# Patient Record
Sex: MALE | Employment: UNEMPLOYED | URBAN - METROPOLITAN AREA
[De-identification: names, ages, dates, MRNs, and addresses within clinical notes are randomized per-mention and may not be internally consistent; named-entity substitution may affect disease eponyms.]

---

## 2019-01-01 ENCOUNTER — OFFICE VISIT (OUTPATIENT)
Dept: INTERPRETER SERVICES | Facility: CLINIC | Age: 69
End: 2019-01-01

## 2019-01-01 ENCOUNTER — APPOINTMENT (OUTPATIENT)
Dept: CARDIOLOGY | Facility: CLINIC | Age: 69
DRG: 270 | End: 2019-01-01
Attending: INTERNAL MEDICINE
Payer: MEDICARE

## 2019-01-01 ENCOUNTER — ALLIED HEALTH/NURSE VISIT (OUTPATIENT)
Dept: NEUROLOGY | Facility: CLINIC | Age: 69
DRG: 270 | End: 2019-01-01
Attending: PSYCHIATRY & NEUROLOGY
Payer: MEDICARE

## 2019-01-01 ENCOUNTER — APPOINTMENT (OUTPATIENT)
Dept: CT IMAGING | Facility: CLINIC | Age: 69
DRG: 270 | End: 2019-01-01
Attending: INTERNAL MEDICINE
Payer: MEDICARE

## 2019-01-01 ENCOUNTER — APPOINTMENT (OUTPATIENT)
Dept: GENERAL RADIOLOGY | Facility: CLINIC | Age: 69
DRG: 270 | End: 2019-01-01
Attending: STUDENT IN AN ORGANIZED HEALTH CARE EDUCATION/TRAINING PROGRAM
Payer: MEDICARE

## 2019-01-01 ENCOUNTER — APPOINTMENT (OUTPATIENT)
Dept: ULTRASOUND IMAGING | Facility: CLINIC | Age: 69
DRG: 270 | End: 2019-01-01
Attending: STUDENT IN AN ORGANIZED HEALTH CARE EDUCATION/TRAINING PROGRAM
Payer: MEDICARE

## 2019-01-01 ENCOUNTER — APPOINTMENT (OUTPATIENT)
Dept: GENERAL RADIOLOGY | Facility: CLINIC | Age: 69
DRG: 270 | End: 2019-01-01
Attending: INTERNAL MEDICINE
Payer: MEDICARE

## 2019-01-01 ENCOUNTER — APPOINTMENT (OUTPATIENT)
Dept: CT IMAGING | Facility: CLINIC | Age: 69
DRG: 270 | End: 2019-01-01
Attending: STUDENT IN AN ORGANIZED HEALTH CARE EDUCATION/TRAINING PROGRAM
Payer: MEDICARE

## 2019-01-01 ENCOUNTER — HOSPITAL ENCOUNTER (INPATIENT)
Facility: CLINIC | Age: 69
LOS: 5 days | DRG: 270 | End: 2019-08-06
Admitting: INTERNAL MEDICINE
Payer: MEDICARE

## 2019-01-01 ENCOUNTER — APPOINTMENT (OUTPATIENT)
Dept: NUCLEAR MEDICINE | Facility: CLINIC | Age: 69
DRG: 270 | End: 2019-01-01
Attending: INTERNAL MEDICINE
Payer: MEDICARE

## 2019-01-01 ENCOUNTER — TRANSFERRED RECORDS (OUTPATIENT)
Dept: HEALTH INFORMATION MANAGEMENT | Facility: CLINIC | Age: 69
End: 2019-01-01

## 2019-01-01 ENCOUNTER — APPOINTMENT (OUTPATIENT)
Dept: CARDIOLOGY | Facility: CLINIC | Age: 69
DRG: 270 | End: 2019-01-01
Attending: STUDENT IN AN ORGANIZED HEALTH CARE EDUCATION/TRAINING PROGRAM
Payer: MEDICARE

## 2019-01-01 VITALS
BODY MASS INDEX: 29.69 KG/M2 | WEIGHT: 212.08 LBS | HEIGHT: 71 IN | TEMPERATURE: 97 F | OXYGEN SATURATION: 100 % | RESPIRATION RATE: 12 BRPM

## 2019-01-01 DIAGNOSIS — I46.9 CARDIAC ARREST (H): Primary | ICD-10-CM

## 2019-01-01 LAB
ABO + RH BLD: NORMAL
ALBUMIN SERPL-MCNC: 1.6 G/DL (ref 3.4–5)
ALBUMIN SERPL-MCNC: 1.7 G/DL (ref 3.4–5)
ALBUMIN SERPL-MCNC: 2 G/DL (ref 3.4–5)
ALBUMIN SERPL-MCNC: 2.1 G/DL (ref 3.4–5)
ALBUMIN SERPL-MCNC: 2.2 G/DL (ref 3.4–5)
ALBUMIN SERPL-MCNC: 2.4 G/DL (ref 3.4–5)
ALBUMIN SERPL-MCNC: 2.4 G/DL (ref 3.4–5)
ALBUMIN SERPL-MCNC: 2.5 G/DL (ref 3.4–5)
ALBUMIN SERPL-MCNC: 2.6 G/DL (ref 3.4–5)
ALBUMIN SERPL-MCNC: 2.6 G/DL (ref 3.4–5)
ALBUMIN SERPL-MCNC: 2.7 G/DL (ref 3.4–5)
ALBUMIN SERPL-MCNC: 2.8 G/DL (ref 3.4–5)
ALBUMIN SERPL-MCNC: 2.9 G/DL (ref 3.4–5)
ALBUMIN SERPL-MCNC: 3 G/DL (ref 3.4–5)
ALBUMIN SERPL-MCNC: 3 G/DL (ref 3.4–5)
ALBUMIN SERPL-MCNC: 3.2 G/DL (ref 3.4–5)
ALBUMIN UR-MCNC: 100 MG/DL
ALP SERPL-CCNC: 28 U/L (ref 40–150)
ALP SERPL-CCNC: 31 U/L (ref 40–150)
ALP SERPL-CCNC: 32 U/L (ref 40–150)
ALP SERPL-CCNC: 33 U/L (ref 40–150)
ALP SERPL-CCNC: 34 U/L (ref 40–150)
ALP SERPL-CCNC: 34 U/L (ref 40–150)
ALP SERPL-CCNC: 45 U/L (ref 40–150)
ALP SERPL-CCNC: 48 U/L (ref 40–150)
ALP SERPL-CCNC: 49 U/L (ref 40–150)
ALP SERPL-CCNC: 51 U/L (ref 40–150)
ALP SERPL-CCNC: 53 U/L (ref 40–150)
ALP SERPL-CCNC: 57 U/L (ref 40–150)
ALP SERPL-CCNC: 60 U/L (ref 40–150)
ALP SERPL-CCNC: 63 U/L (ref 40–150)
ALP SERPL-CCNC: 65 U/L (ref 40–150)
ALP SERPL-CCNC: 68 U/L (ref 40–150)
ALP SERPL-CCNC: 71 U/L (ref 40–150)
ALP SERPL-CCNC: 74 U/L (ref 40–150)
ALP SERPL-CCNC: 78 U/L (ref 40–150)
ALP SERPL-CCNC: 79 U/L (ref 40–150)
ALP SERPL-CCNC: 79 U/L (ref 40–150)
ALP SERPL-CCNC: 84 U/L (ref 40–150)
ALT SERPL W P-5'-P-CCNC: 103 U/L (ref 0–70)
ALT SERPL W P-5'-P-CCNC: 105 U/L (ref 0–70)
ALT SERPL W P-5'-P-CCNC: 106 U/L (ref 0–70)
ALT SERPL W P-5'-P-CCNC: 108 U/L (ref 0–70)
ALT SERPL W P-5'-P-CCNC: 113 U/L (ref 0–70)
ALT SERPL W P-5'-P-CCNC: 1139 U/L (ref 0–70)
ALT SERPL W P-5'-P-CCNC: 1170 U/L (ref 0–70)
ALT SERPL W P-5'-P-CCNC: 1324 U/L (ref 0–70)
ALT SERPL W P-5'-P-CCNC: 1402 U/L (ref 0–70)
ALT SERPL W P-5'-P-CCNC: 1410 U/L (ref 0–70)
ALT SERPL W P-5'-P-CCNC: 147 U/L (ref 0–70)
ALT SERPL W P-5'-P-CCNC: 159 U/L (ref 0–70)
ALT SERPL W P-5'-P-CCNC: 1690 U/L (ref 0–70)
ALT SERPL W P-5'-P-CCNC: 1964 U/L (ref 0–70)
ALT SERPL W P-5'-P-CCNC: 2089 U/L (ref 0–70)
ALT SERPL W P-5'-P-CCNC: 2156 U/L (ref 0–70)
ALT SERPL W P-5'-P-CCNC: 2319 U/L (ref 0–70)
ALT SERPL W P-5'-P-CCNC: 2576 U/L (ref 0–70)
ALT SERPL W P-5'-P-CCNC: 2790 U/L (ref 0–70)
ALT SERPL W P-5'-P-CCNC: 2820 U/L (ref 0–70)
ALT SERPL W P-5'-P-CCNC: 330 U/L (ref 0–70)
ALT SERPL W P-5'-P-CCNC: 360 U/L (ref 0–70)
AMORPH CRY #/AREA URNS HPF: ABNORMAL /HPF
AMPHETAMINES UR QL SCN: NEGATIVE
ANGLE RATE OF CLOT STRENGTH: 67.8 DEGREES (ref 53–72)
ANGLE RATE OF CLOT STRENGTH: 69.4 DEGREES (ref 53–72)
ANGLE RATE OF CLOT STRENGTH: 71.7 DEGREES (ref 53–72)
ANION GAP SERPL CALCULATED.3IONS-SCNC: 10 MMOL/L (ref 3–14)
ANION GAP SERPL CALCULATED.3IONS-SCNC: 11 MMOL/L (ref 3–14)
ANION GAP SERPL CALCULATED.3IONS-SCNC: 11 MMOL/L (ref 3–14)
ANION GAP SERPL CALCULATED.3IONS-SCNC: 12 MMOL/L (ref 3–14)
ANION GAP SERPL CALCULATED.3IONS-SCNC: 13 MMOL/L (ref 3–14)
ANION GAP SERPL CALCULATED.3IONS-SCNC: 14 MMOL/L (ref 3–14)
ANION GAP SERPL CALCULATED.3IONS-SCNC: 14 MMOL/L (ref 3–14)
ANION GAP SERPL CALCULATED.3IONS-SCNC: 18 MMOL/L (ref 3–14)
ANION GAP SERPL CALCULATED.3IONS-SCNC: 5 MMOL/L (ref 3–14)
ANION GAP SERPL CALCULATED.3IONS-SCNC: 6 MMOL/L (ref 3–14)
ANION GAP SERPL CALCULATED.3IONS-SCNC: 6 MMOL/L (ref 3–14)
ANION GAP SERPL CALCULATED.3IONS-SCNC: 7 MMOL/L (ref 3–14)
ANION GAP SERPL CALCULATED.3IONS-SCNC: 7 MMOL/L (ref 3–14)
ANION GAP SERPL CALCULATED.3IONS-SCNC: 8 MMOL/L (ref 3–14)
ANION GAP SERPL CALCULATED.3IONS-SCNC: 9 MMOL/L (ref 3–14)
ANISOCYTOSIS BLD QL SMEAR: SLIGHT
APPEARANCE UR: CLEAR
APTT PPP: 47 SEC (ref 22–37)
APTT PPP: 48 SEC (ref 22–37)
APTT PPP: 48 SEC (ref 22–37)
APTT PPP: 50 SEC (ref 22–37)
APTT PPP: 52 SEC (ref 22–37)
APTT PPP: 54 SEC (ref 22–37)
APTT PPP: 56 SEC (ref 22–37)
APTT PPP: 56 SEC (ref 22–37)
APTT PPP: 57 SEC (ref 22–37)
APTT PPP: 60 SEC (ref 22–37)
APTT PPP: 62 SEC (ref 22–37)
APTT PPP: 62 SEC (ref 22–37)
APTT PPP: 63 SEC (ref 22–37)
APTT PPP: 64 SEC (ref 22–37)
APTT PPP: 68 SEC (ref 22–37)
APTT PPP: 79 SEC (ref 22–37)
APTT PPP: 80 SEC (ref 22–37)
APTT PPP: 83 SEC (ref 22–37)
APTT PPP: 83 SEC (ref 22–37)
APTT PPP: >240 SEC (ref 22–37)
AST SERPL W P-5'-P-CCNC: 1281 U/L (ref 0–45)
AST SERPL W P-5'-P-CCNC: 1423 U/L (ref 0–45)
AST SERPL W P-5'-P-CCNC: 1610 U/L (ref 0–45)
AST SERPL W P-5'-P-CCNC: 1990 U/L (ref 0–45)
AST SERPL W P-5'-P-CCNC: 1995 U/L (ref 0–45)
AST SERPL W P-5'-P-CCNC: 2521 U/L (ref 0–45)
AST SERPL W P-5'-P-CCNC: 2905 U/L (ref 0–45)
AST SERPL W P-5'-P-CCNC: 3102 U/L (ref 0–45)
AST SERPL W P-5'-P-CCNC: 318 U/L (ref 0–45)
AST SERPL W P-5'-P-CCNC: 3396 U/L (ref 0–45)
AST SERPL W P-5'-P-CCNC: 387 U/L (ref 0–45)
AST SERPL W P-5'-P-CCNC: 3938 U/L (ref 0–45)
AST SERPL W P-5'-P-CCNC: 410 U/L (ref 0–45)
AST SERPL W P-5'-P-CCNC: 4276 U/L (ref 0–45)
AST SERPL W P-5'-P-CCNC: 4373 U/L (ref 0–45)
AST SERPL W P-5'-P-CCNC: 456 U/L (ref 0–45)
AST SERPL W P-5'-P-CCNC: 482 U/L (ref 0–45)
AST SERPL W P-5'-P-CCNC: 522 U/L (ref 0–45)
AST SERPL W P-5'-P-CCNC: 529 U/L (ref 0–45)
AST SERPL W P-5'-P-CCNC: 550 U/L (ref 0–45)
AST SERPL W P-5'-P-CCNC: 567 U/L (ref 0–45)
AST SERPL W P-5'-P-CCNC: ABNORMAL U/L (ref 0–45)
AT III ACT/NOR PPP CHRO: 32 % (ref 85–135)
AT III ACT/NOR PPP CHRO: 41 % (ref 85–135)
AT III ACT/NOR PPP CHRO: 41 % (ref 85–135)
AT III ACT/NOR PPP CHRO: 47 % (ref 85–135)
AT III ACT/NOR PPP CHRO: 50 % (ref 85–135)
AT III ACT/NOR PPP CHRO: 54 % (ref 85–135)
BACTERIA SPEC CULT: NO GROWTH
BACTERIA SPEC CULT: NORMAL
BACTERIA SPEC CULT: NORMAL
BARBITURATES UR QL: NEGATIVE
BASE DEFICIT BLDA-SCNC: 0.1 MMOL/L
BASE DEFICIT BLDA-SCNC: 0.2 MMOL/L
BASE DEFICIT BLDA-SCNC: 0.3 MMOL/L
BASE DEFICIT BLDA-SCNC: 0.4 MMOL/L
BASE DEFICIT BLDA-SCNC: 0.5 MMOL/L
BASE DEFICIT BLDA-SCNC: 0.7 MMOL/L
BASE DEFICIT BLDA-SCNC: 1.1 MMOL/L
BASE DEFICIT BLDA-SCNC: 1.2 MMOL/L
BASE DEFICIT BLDA-SCNC: 1.2 MMOL/L
BASE DEFICIT BLDA-SCNC: 1.3 MMOL/L
BASE DEFICIT BLDA-SCNC: 1.4 MMOL/L
BASE DEFICIT BLDA-SCNC: 1.4 MMOL/L
BASE DEFICIT BLDA-SCNC: 1.6 MMOL/L
BASE DEFICIT BLDA-SCNC: 1.6 MMOL/L
BASE DEFICIT BLDA-SCNC: 1.7 MMOL/L
BASE DEFICIT BLDA-SCNC: 1.8 MMOL/L
BASE DEFICIT BLDA-SCNC: 1.9 MMOL/L
BASE DEFICIT BLDA-SCNC: 2 MMOL/L
BASE DEFICIT BLDA-SCNC: 2 MMOL/L
BASE DEFICIT BLDA-SCNC: 2.4 MMOL/L
BASE DEFICIT BLDA-SCNC: 2.6 MMOL/L
BASE DEFICIT BLDA-SCNC: 2.9 MMOL/L
BASE DEFICIT BLDA-SCNC: 3 MMOL/L
BASE DEFICIT BLDA-SCNC: 3.1 MMOL/L
BASE DEFICIT BLDA-SCNC: 3.1 MMOL/L
BASE DEFICIT BLDA-SCNC: 3.4 MMOL/L
BASE DEFICIT BLDA-SCNC: 3.4 MMOL/L
BASE DEFICIT BLDA-SCNC: 3.5 MMOL/L
BASE DEFICIT BLDA-SCNC: 3.5 MMOL/L
BASE DEFICIT BLDA-SCNC: 3.7 MMOL/L
BASE DEFICIT BLDA-SCNC: 3.7 MMOL/L
BASE DEFICIT BLDA-SCNC: 3.9 MMOL/L
BASE DEFICIT BLDA-SCNC: 4 MMOL/L
BASE DEFICIT BLDA-SCNC: 4.1 MMOL/L
BASE DEFICIT BLDA-SCNC: 4.2 MMOL/L
BASE DEFICIT BLDA-SCNC: 4.3 MMOL/L
BASE DEFICIT BLDA-SCNC: 4.4 MMOL/L
BASE DEFICIT BLDA-SCNC: 4.5 MMOL/L
BASE DEFICIT BLDA-SCNC: 4.6 MMOL/L
BASE DEFICIT BLDA-SCNC: 4.7 MMOL/L
BASE DEFICIT BLDA-SCNC: 4.8 MMOL/L
BASE DEFICIT BLDA-SCNC: 4.9 MMOL/L
BASE DEFICIT BLDA-SCNC: 5 MMOL/L
BASE DEFICIT BLDA-SCNC: 5.1 MMOL/L
BASE DEFICIT BLDA-SCNC: 5.3 MMOL/L
BASE DEFICIT BLDA-SCNC: 5.3 MMOL/L
BASE DEFICIT BLDA-SCNC: 6 MMOL/L
BASE DEFICIT BLDA-SCNC: 6.1 MMOL/L
BASE DEFICIT BLDA-SCNC: 6.3 MMOL/L
BASE DEFICIT BLDA-SCNC: 6.5 MMOL/L
BASE DEFICIT BLDA-SCNC: 6.5 MMOL/L
BASE DEFICIT BLDA-SCNC: 6.9 MMOL/L
BASE DEFICIT BLDA-SCNC: 7.2 MMOL/L
BASE DEFICIT BLDA-SCNC: 7.8 MMOL/L
BASE DEFICIT BLDA-SCNC: 8.1 MMOL/L
BASE DEFICIT BLDV-SCNC: 0.7 MMOL/L
BASE DEFICIT BLDV-SCNC: 1.1 MMOL/L
BASE DEFICIT BLDV-SCNC: 2.1 MMOL/L
BASE DEFICIT BLDV-SCNC: 2.7 MMOL/L
BASE DEFICIT BLDV-SCNC: 3.6 MMOL/L
BASE DEFICIT BLDV-SCNC: 3.7 MMOL/L
BASE DEFICIT BLDV-SCNC: 4.1 MMOL/L
BASE DEFICIT BLDV-SCNC: 5.4 MMOL/L
BASE DEFICIT BLDV-SCNC: 6 MMOL/L
BASE EXCESS BLDA CALC-SCNC: 0.3 MMOL/L
BASE EXCESS BLDV CALC-SCNC: 0.5 MMOL/L
BASOPHILS # BLD AUTO: 0 10E9/L (ref 0–0.2)
BASOPHILS # BLD AUTO: 0.1 10E9/L (ref 0–0.2)
BASOPHILS NFR BLD AUTO: 0 %
BASOPHILS NFR BLD AUTO: 0.2 %
BASOPHILS NFR BLD AUTO: 0.2 %
BASOPHILS NFR BLD AUTO: 0.9 %
BENZODIAZ UR QL: POSITIVE
BILIRUB SERPL-MCNC: 0.5 MG/DL (ref 0.2–1.3)
BILIRUB SERPL-MCNC: 0.6 MG/DL (ref 0.2–1.3)
BILIRUB SERPL-MCNC: 1.2 MG/DL (ref 0.2–1.3)
BILIRUB SERPL-MCNC: 1.3 MG/DL (ref 0.2–1.3)
BILIRUB SERPL-MCNC: 2.2 MG/DL (ref 0.2–1.3)
BILIRUB SERPL-MCNC: 2.5 MG/DL (ref 0.2–1.3)
BILIRUB SERPL-MCNC: 2.5 MG/DL (ref 0.2–1.3)
BILIRUB SERPL-MCNC: 2.9 MG/DL (ref 0.2–1.3)
BILIRUB SERPL-MCNC: 3.4 MG/DL (ref 0.2–1.3)
BILIRUB SERPL-MCNC: 3.9 MG/DL (ref 0.2–1.3)
BILIRUB SERPL-MCNC: 4.4 MG/DL (ref 0.2–1.3)
BILIRUB SERPL-MCNC: 4.6 MG/DL (ref 0.2–1.3)
BILIRUB SERPL-MCNC: 4.7 MG/DL (ref 0.2–1.3)
BILIRUB SERPL-MCNC: 5 MG/DL (ref 0.2–1.3)
BILIRUB SERPL-MCNC: 5 MG/DL (ref 0.2–1.3)
BILIRUB SERPL-MCNC: 5.3 MG/DL (ref 0.2–1.3)
BILIRUB SERPL-MCNC: 5.6 MG/DL (ref 0.2–1.3)
BILIRUB SERPL-MCNC: 5.7 MG/DL (ref 0.2–1.3)
BILIRUB SERPL-MCNC: 5.8 MG/DL (ref 0.2–1.3)
BILIRUB SERPL-MCNC: 6.4 MG/DL (ref 0.2–1.3)
BILIRUB SERPL-MCNC: 6.5 MG/DL (ref 0.2–1.3)
BILIRUB SERPL-MCNC: 6.7 MG/DL (ref 0.2–1.3)
BILIRUB UR QL STRIP: NEGATIVE
BITE CELLS BLD QL SMEAR: SLIGHT
BLD GP AB SCN SERPL QL: NORMAL
BLD GP AB SCN SERPL QL: NORMAL
BLD PROD TYP BPU: NORMAL
BLD UNIT ID BPU: 0
BLOOD BANK CMNT PATIENT-IMP: NORMAL
BLOOD BANK CMNT PATIENT-IMP: NORMAL
BLOOD PRODUCT CODE: NORMAL
BPU ID: NORMAL
BUN SERPL-MCNC: 15 MG/DL (ref 7–30)
BUN SERPL-MCNC: 16 MG/DL (ref 7–30)
BUN SERPL-MCNC: 17 MG/DL (ref 7–30)
BUN SERPL-MCNC: 18 MG/DL (ref 7–30)
BUN SERPL-MCNC: 22 MG/DL (ref 7–30)
BUN SERPL-MCNC: 24 MG/DL (ref 7–30)
BUN SERPL-MCNC: 24 MG/DL (ref 7–30)
BUN SERPL-MCNC: 26 MG/DL (ref 7–30)
BUN SERPL-MCNC: 26 MG/DL (ref 7–30)
BUN SERPL-MCNC: 27 MG/DL (ref 7–30)
BUN SERPL-MCNC: 29 MG/DL (ref 7–30)
BUN SERPL-MCNC: 31 MG/DL (ref 7–30)
BUN SERPL-MCNC: 32 MG/DL (ref 7–30)
BUN SERPL-MCNC: 32 MG/DL (ref 7–30)
BUN SERPL-MCNC: 35 MG/DL (ref 7–30)
BUN SERPL-MCNC: 37 MG/DL (ref 7–30)
BUN SERPL-MCNC: 38 MG/DL (ref 7–30)
BUN SERPL-MCNC: 39 MG/DL (ref 7–30)
BUN SERPL-MCNC: 42 MG/DL (ref 7–30)
BUN SERPL-MCNC: 43 MG/DL (ref 7–30)
BUN SERPL-MCNC: 43 MG/DL (ref 7–30)
BUN SERPL-MCNC: 44 MG/DL (ref 7–30)
BURR CELLS BLD QL SMEAR: ABNORMAL
BURR CELLS BLD QL SMEAR: ABNORMAL
BURR CELLS BLD QL SMEAR: SLIGHT
CA-I BLD-MCNC: 3.4 MG/DL (ref 4.4–5.2)
CA-I BLD-MCNC: 3.9 MG/DL (ref 4.4–5.2)
CA-I BLD-MCNC: 4.1 MG/DL (ref 4.4–5.2)
CA-I BLD-MCNC: 4.3 MG/DL (ref 4.4–5.2)
CA-I BLD-MCNC: 4.4 MG/DL (ref 4.4–5.2)
CA-I BLD-MCNC: 4.5 MG/DL (ref 4.4–5.2)
CA-I BLD-MCNC: 4.6 MG/DL (ref 4.4–5.2)
CA-I BLD-MCNC: 4.6 MG/DL (ref 4.4–5.2)
CA-I BLD-MCNC: 4.8 MG/DL (ref 4.4–5.2)
CA-I BLD-SCNC: 3.4 MG/DL (ref 4.4–5.2)
CA-I BLD-SCNC: 3.7 MG/DL (ref 4.4–5.2)
CA-I BLD-SCNC: 4 MG/DL (ref 4.4–5.2)
CA-I BLD-SCNC: 4.9 MG/DL (ref 4.4–5.2)
CA-I SERPL ISE-MCNC: 4 MG/DL (ref 4.4–5.2)
CALCIUM SERPL-MCNC: 5.9 MG/DL (ref 8.5–10.1)
CALCIUM SERPL-MCNC: 6.7 MG/DL (ref 8.5–10.1)
CALCIUM SERPL-MCNC: 7.2 MG/DL (ref 8.5–10.1)
CALCIUM SERPL-MCNC: 7.4 MG/DL (ref 8.5–10.1)
CALCIUM SERPL-MCNC: 7.4 MG/DL (ref 8.5–10.1)
CALCIUM SERPL-MCNC: 7.5 MG/DL (ref 8.5–10.1)
CALCIUM SERPL-MCNC: 7.6 MG/DL (ref 8.5–10.1)
CALCIUM SERPL-MCNC: 7.7 MG/DL (ref 8.5–10.1)
CALCIUM SERPL-MCNC: 7.8 MG/DL (ref 8.5–10.1)
CALCIUM SERPL-MCNC: 7.9 MG/DL (ref 8.5–10.1)
CALCIUM SERPL-MCNC: 7.9 MG/DL (ref 8.5–10.1)
CALCIUM SERPL-MCNC: 8 MG/DL (ref 8.5–10.1)
CANNABINOIDS UR QL SCN: NEGATIVE
CHLORIDE SERPL-SCNC: 108 MMOL/L (ref 94–109)
CHLORIDE SERPL-SCNC: 109 MMOL/L (ref 94–109)
CHLORIDE SERPL-SCNC: 110 MMOL/L (ref 94–109)
CHLORIDE SERPL-SCNC: 111 MMOL/L (ref 94–109)
CHLORIDE SERPL-SCNC: 111 MMOL/L (ref 94–109)
CHLORIDE SERPL-SCNC: 112 MMOL/L (ref 94–109)
CHLORIDE SERPL-SCNC: 114 MMOL/L (ref 94–109)
CHLORIDE SERPL-SCNC: 118 MMOL/L (ref 94–109)
CHLORIDE SERPL-SCNC: 120 MMOL/L (ref 94–109)
CHLORIDE SERPL-SCNC: 121 MMOL/L (ref 94–109)
CHLORIDE SERPL-SCNC: 123 MMOL/L (ref 94–109)
CHLORIDE SERPL-SCNC: 125 MMOL/L (ref 94–109)
CHLORIDE SERPL-SCNC: 97 MMOL/L (ref 94–109)
CI HYPERCOAGULATION INDEX: 1.2 RATIO (ref 0–3)
CI HYPERCOAGULATION INDEX: 1.4 RATIO (ref 0–3)
CI HYPERCOAGULATION INDEX: 2.6 RATIO (ref 0–3)
CO2 BLD-SCNC: 14 MMOL/L (ref 21–28)
CO2 BLD-SCNC: 14 MMOL/L (ref 21–28)
CO2 BLD-SCNC: 16 MMOL/L (ref 21–28)
CO2 BLD-SCNC: 18 MMOL/L (ref 21–28)
CO2 BLD-SCNC: 19 MMOL/L (ref 21–28)
CO2 BLDCOV-SCNC: 20 MMOL/L (ref 21–28)
CO2 SERPL-SCNC: 14 MMOL/L (ref 20–32)
CO2 SERPL-SCNC: 18 MMOL/L (ref 20–32)
CO2 SERPL-SCNC: 19 MMOL/L (ref 20–32)
CO2 SERPL-SCNC: 19 MMOL/L (ref 20–32)
CO2 SERPL-SCNC: 20 MMOL/L (ref 20–32)
CO2 SERPL-SCNC: 20 MMOL/L (ref 20–32)
CO2 SERPL-SCNC: 21 MMOL/L (ref 20–32)
CO2 SERPL-SCNC: 21 MMOL/L (ref 20–32)
CO2 SERPL-SCNC: 22 MMOL/L (ref 20–32)
CO2 SERPL-SCNC: 23 MMOL/L (ref 20–32)
CO2 SERPL-SCNC: 24 MMOL/L (ref 20–32)
CO2 SERPL-SCNC: 25 MMOL/L (ref 20–32)
COCAINE UR QL: NEGATIVE
COLOR UR AUTO: ABNORMAL
CORTIS SERPL-MCNC: 35.5 UG/DL (ref 4–22)
CREAT SERPL-MCNC: 1.12 MG/DL (ref 0.66–1.25)
CREAT SERPL-MCNC: 1.16 MG/DL (ref 0.66–1.25)
CREAT SERPL-MCNC: 1.25 MG/DL (ref 0.66–1.25)
CREAT SERPL-MCNC: 1.28 MG/DL (ref 0.66–1.25)
CREAT SERPL-MCNC: 1.37 MG/DL (ref 0.66–1.25)
CREAT SERPL-MCNC: 1.65 MG/DL (ref 0.66–1.25)
CREAT SERPL-MCNC: 1.75 MG/DL (ref 0.66–1.25)
CREAT SERPL-MCNC: 1.77 MG/DL (ref 0.66–1.25)
CREAT SERPL-MCNC: 1.77 MG/DL (ref 0.66–1.25)
CREAT SERPL-MCNC: 1.78 MG/DL (ref 0.66–1.25)
CREAT SERPL-MCNC: 1.84 MG/DL (ref 0.66–1.25)
CREAT SERPL-MCNC: 1.85 MG/DL (ref 0.66–1.25)
CREAT SERPL-MCNC: 1.89 MG/DL (ref 0.66–1.25)
CREAT SERPL-MCNC: 1.91 MG/DL (ref 0.66–1.25)
CREAT SERPL-MCNC: 1.94 MG/DL (ref 0.66–1.25)
CREAT SERPL-MCNC: 1.98 MG/DL (ref 0.66–1.25)
CREAT SERPL-MCNC: 2.11 MG/DL (ref 0.66–1.25)
CREAT SERPL-MCNC: 2.17 MG/DL (ref 0.66–1.25)
CREAT SERPL-MCNC: 2.25 MG/DL (ref 0.66–1.25)
CREAT SERPL-MCNC: 2.3 MG/DL (ref 0.66–1.25)
CREAT SERPL-MCNC: 2.31 MG/DL (ref 0.66–1.25)
CREAT SERPL-MCNC: 2.38 MG/DL (ref 0.66–1.25)
CRP SERPL-MCNC: 150 MG/L (ref 0–8)
CRP SERPL-MCNC: 220 MG/L (ref 0–8)
CRP SERPL-MCNC: 260 MG/L (ref 0–8)
CRP SERPL-MCNC: 290 MG/L (ref 0–8)
CRP SERPL-MCNC: 320 MG/L (ref 0–8)
CRP SERPL-MCNC: <2.9 MG/L (ref 0–8)
D DIMER PPP FEU-MCNC: 12.6 UG/ML FEU (ref 0–0.5)
D DIMER PPP FEU-MCNC: 15.8 UG/ML FEU (ref 0–0.5)
D DIMER PPP FEU-MCNC: 19.8 UG/ML FEU (ref 0–0.5)
D DIMER PPP FEU-MCNC: 3.5 UG/ML FEU (ref 0–0.5)
D DIMER PPP FEU-MCNC: 4.3 UG/ML FEU (ref 0–0.5)
D DIMER PPP FEU-MCNC: >20 UG/ML FEU (ref 0–0.5)
DACRYOCYTES BLD QL SMEAR: SLIGHT
DIFFERENTIAL METHOD BLD: ABNORMAL
EOSINOPHIL # BLD AUTO: 0 10E9/L (ref 0–0.7)
EOSINOPHIL # BLD AUTO: 0.1 10E9/L (ref 0–0.7)
EOSINOPHIL # BLD AUTO: 0.2 10E9/L (ref 0–0.7)
EOSINOPHIL NFR BLD AUTO: 0 %
EOSINOPHIL NFR BLD AUTO: 0.2 %
EOSINOPHIL NFR BLD AUTO: 0.8 %
EOSINOPHIL NFR BLD AUTO: 0.9 %
EOSINOPHIL NFR BLD AUTO: 1.8 %
ERYTHROCYTE [DISTWIDTH] IN BLOOD BY AUTOMATED COUNT: 12.8 % (ref 10–15)
ERYTHROCYTE [DISTWIDTH] IN BLOOD BY AUTOMATED COUNT: 13.5 % (ref 10–15)
ERYTHROCYTE [DISTWIDTH] IN BLOOD BY AUTOMATED COUNT: 14.1 % (ref 10–15)
ERYTHROCYTE [DISTWIDTH] IN BLOOD BY AUTOMATED COUNT: 14.2 % (ref 10–15)
ERYTHROCYTE [DISTWIDTH] IN BLOOD BY AUTOMATED COUNT: 14.2 % (ref 10–15)
ERYTHROCYTE [DISTWIDTH] IN BLOOD BY AUTOMATED COUNT: 14.3 % (ref 10–15)
ERYTHROCYTE [DISTWIDTH] IN BLOOD BY AUTOMATED COUNT: 14.4 % (ref 10–15)
ERYTHROCYTE [DISTWIDTH] IN BLOOD BY AUTOMATED COUNT: 14.6 % (ref 10–15)
ERYTHROCYTE [DISTWIDTH] IN BLOOD BY AUTOMATED COUNT: 14.7 % (ref 10–15)
ERYTHROCYTE [DISTWIDTH] IN BLOOD BY AUTOMATED COUNT: 15 % (ref 10–15)
ERYTHROCYTE [DISTWIDTH] IN BLOOD BY AUTOMATED COUNT: 15.2 % (ref 10–15)
ERYTHROCYTE [DISTWIDTH] IN BLOOD BY AUTOMATED COUNT: 15.3 % (ref 10–15)
ERYTHROCYTE [DISTWIDTH] IN BLOOD BY AUTOMATED COUNT: 15.3 % (ref 10–15)
ERYTHROCYTE [DISTWIDTH] IN BLOOD BY AUTOMATED COUNT: 15.4 % (ref 10–15)
ERYTHROCYTE [DISTWIDTH] IN BLOOD BY AUTOMATED COUNT: 15.5 % (ref 10–15)
ERYTHROCYTE [DISTWIDTH] IN BLOOD BY AUTOMATED COUNT: 15.6 % (ref 10–15)
ERYTHROCYTE [DISTWIDTH] IN BLOOD BY AUTOMATED COUNT: 15.7 % (ref 10–15)
ERYTHROCYTE [DISTWIDTH] IN BLOOD BY AUTOMATED COUNT: 15.7 % (ref 10–15)
ERYTHROCYTE [DISTWIDTH] IN BLOOD BY AUTOMATED COUNT: 15.8 % (ref 10–15)
ERYTHROCYTE [SEDIMENTATION RATE] IN BLOOD BY WESTERGREN METHOD: 11 MM/H (ref 0–20)
ERYTHROCYTE [SEDIMENTATION RATE] IN BLOOD BY WESTERGREN METHOD: 13 MM/H (ref 0–20)
ERYTHROCYTE [SEDIMENTATION RATE] IN BLOOD BY WESTERGREN METHOD: 17 MM/H (ref 0–20)
ERYTHROCYTE [SEDIMENTATION RATE] IN BLOOD BY WESTERGREN METHOD: 26 MM/H (ref 0–20)
ERYTHROCYTE [SEDIMENTATION RATE] IN BLOOD BY WESTERGREN METHOD: 4 MM/H (ref 0–20)
ERYTHROCYTE [SEDIMENTATION RATE] IN BLOOD BY WESTERGREN METHOD: 46 MM/H (ref 0–20)
ETHANOL UR QL SCN: NEGATIVE
FIBRINOGEN PPP-MCNC: 174 MG/DL (ref 200–420)
FIBRINOGEN PPP-MCNC: 174 MG/DL (ref 200–420)
FIBRINOGEN PPP-MCNC: 193 MG/DL (ref 200–420)
FIBRINOGEN PPP-MCNC: 250 MG/DL (ref 200–420)
FIBRINOGEN PPP-MCNC: 321 MG/DL (ref 200–420)
FIBRINOGEN PPP-MCNC: 325 MG/DL (ref 200–420)
FIBRINOGEN PPP-MCNC: 338 MG/DL (ref 200–420)
FIBRINOGEN PPP-MCNC: 372 MG/DL (ref 200–420)
FIBRINOGEN PPP-MCNC: 398 MG/DL (ref 200–420)
FIBRINOGEN PPP-MCNC: 398 MG/DL (ref 200–420)
FIBRINOGEN PPP-MCNC: 446 MG/DL (ref 200–420)
FIBRINOGEN PPP-MCNC: 462 MG/DL (ref 200–420)
G ACTUAL CLOT STRENGTH: 7.1 KD/SC (ref 4.5–11)
G ACTUAL CLOT STRENGTH: 8.9 KD/SC (ref 4.5–11)
G ACTUAL CLOT STRENGTH: 9.6 KD/SC (ref 4.5–11)
GFR SERPL CREATININE-BSD FRML MDRD: 27 ML/MIN/{1.73_M2}
GFR SERPL CREATININE-BSD FRML MDRD: 28 ML/MIN/{1.73_M2}
GFR SERPL CREATININE-BSD FRML MDRD: 28 ML/MIN/{1.73_M2}
GFR SERPL CREATININE-BSD FRML MDRD: 29 ML/MIN/{1.73_M2}
GFR SERPL CREATININE-BSD FRML MDRD: 30 ML/MIN/{1.73_M2}
GFR SERPL CREATININE-BSD FRML MDRD: 31 ML/MIN/{1.73_M2}
GFR SERPL CREATININE-BSD FRML MDRD: 34 ML/MIN/{1.73_M2}
GFR SERPL CREATININE-BSD FRML MDRD: 35 ML/MIN/{1.73_M2}
GFR SERPL CREATININE-BSD FRML MDRD: 35 ML/MIN/{1.73_M2}
GFR SERPL CREATININE-BSD FRML MDRD: 36 ML/MIN/{1.73_M2}
GFR SERPL CREATININE-BSD FRML MDRD: 37 ML/MIN/{1.73_M2}
GFR SERPL CREATININE-BSD FRML MDRD: 37 ML/MIN/{1.73_M2}
GFR SERPL CREATININE-BSD FRML MDRD: 38 ML/MIN/{1.73_M2}
GFR SERPL CREATININE-BSD FRML MDRD: 39 ML/MIN/{1.73_M2}
GFR SERPL CREATININE-BSD FRML MDRD: 41 ML/MIN/{1.73_M2}
GFR SERPL CREATININE-BSD FRML MDRD: 42 ML/MIN/{1.73_M2}
GFR SERPL CREATININE-BSD FRML MDRD: 47 ML/MIN/{1.73_M2}
GFR SERPL CREATININE-BSD FRML MDRD: 53 ML/MIN/{1.73_M2}
GFR SERPL CREATININE-BSD FRML MDRD: 57 ML/MIN/{1.73_M2}
GFR SERPL CREATININE-BSD FRML MDRD: 65 ML/MIN/{1.73_M2}
GLUCOSE BLD-MCNC: 101 MG/DL (ref 70–99)
GLUCOSE BLD-MCNC: 104 MG/DL (ref 70–99)
GLUCOSE BLD-MCNC: 108 MG/DL (ref 70–99)
GLUCOSE BLD-MCNC: 108 MG/DL (ref 70–99)
GLUCOSE BLD-MCNC: 112 MG/DL (ref 70–99)
GLUCOSE BLD-MCNC: 121 MG/DL (ref 70–99)
GLUCOSE BLD-MCNC: 129 MG/DL (ref 70–99)
GLUCOSE BLD-MCNC: 129 MG/DL (ref 70–99)
GLUCOSE BLD-MCNC: 138 MG/DL (ref 70–99)
GLUCOSE BLD-MCNC: 141 MG/DL (ref 70–99)
GLUCOSE BLD-MCNC: 145 MG/DL (ref 70–99)
GLUCOSE BLD-MCNC: 153 MG/DL (ref 70–99)
GLUCOSE BLD-MCNC: 158 MG/DL (ref 70–99)
GLUCOSE BLD-MCNC: 159 MG/DL (ref 70–99)
GLUCOSE BLD-MCNC: 159 MG/DL (ref 70–99)
GLUCOSE BLD-MCNC: 163 MG/DL (ref 70–99)
GLUCOSE BLD-MCNC: 163 MG/DL (ref 70–99)
GLUCOSE BLD-MCNC: 184 MG/DL (ref 70–99)
GLUCOSE BLD-MCNC: 190 MG/DL (ref 70–99)
GLUCOSE BLD-MCNC: 190 MG/DL (ref 70–99)
GLUCOSE BLD-MCNC: 195 MG/DL (ref 70–99)
GLUCOSE BLD-MCNC: 226 MG/DL (ref 70–99)
GLUCOSE BLD-MCNC: 230 MG/DL (ref 70–99)
GLUCOSE BLD-MCNC: 82 MG/DL (ref 70–99)
GLUCOSE BLD-MCNC: 86 MG/DL (ref 70–99)
GLUCOSE BLDC GLUCOMTR-MCNC: 101 MG/DL (ref 70–99)
GLUCOSE BLDC GLUCOMTR-MCNC: 103 MG/DL (ref 70–99)
GLUCOSE BLDC GLUCOMTR-MCNC: 104 MG/DL (ref 70–99)
GLUCOSE BLDC GLUCOMTR-MCNC: 104 MG/DL (ref 70–99)
GLUCOSE BLDC GLUCOMTR-MCNC: 105 MG/DL (ref 70–99)
GLUCOSE BLDC GLUCOMTR-MCNC: 108 MG/DL (ref 70–99)
GLUCOSE BLDC GLUCOMTR-MCNC: 109 MG/DL (ref 70–99)
GLUCOSE BLDC GLUCOMTR-MCNC: 110 MG/DL (ref 70–99)
GLUCOSE BLDC GLUCOMTR-MCNC: 111 MG/DL (ref 70–99)
GLUCOSE BLDC GLUCOMTR-MCNC: 112 MG/DL (ref 70–99)
GLUCOSE BLDC GLUCOMTR-MCNC: 112 MG/DL (ref 70–99)
GLUCOSE BLDC GLUCOMTR-MCNC: 116 MG/DL (ref 70–99)
GLUCOSE BLDC GLUCOMTR-MCNC: 119 MG/DL (ref 70–99)
GLUCOSE BLDC GLUCOMTR-MCNC: 121 MG/DL (ref 70–99)
GLUCOSE BLDC GLUCOMTR-MCNC: 124 MG/DL (ref 70–99)
GLUCOSE BLDC GLUCOMTR-MCNC: 125 MG/DL (ref 70–99)
GLUCOSE BLDC GLUCOMTR-MCNC: 125 MG/DL (ref 70–99)
GLUCOSE BLDC GLUCOMTR-MCNC: 131 MG/DL (ref 70–99)
GLUCOSE BLDC GLUCOMTR-MCNC: 132 MG/DL (ref 70–99)
GLUCOSE BLDC GLUCOMTR-MCNC: 132 MG/DL (ref 70–99)
GLUCOSE BLDC GLUCOMTR-MCNC: 135 MG/DL (ref 70–99)
GLUCOSE BLDC GLUCOMTR-MCNC: 135 MG/DL (ref 70–99)
GLUCOSE BLDC GLUCOMTR-MCNC: 136 MG/DL (ref 70–99)
GLUCOSE BLDC GLUCOMTR-MCNC: 137 MG/DL (ref 70–99)
GLUCOSE BLDC GLUCOMTR-MCNC: 140 MG/DL (ref 70–99)
GLUCOSE BLDC GLUCOMTR-MCNC: 141 MG/DL (ref 70–99)
GLUCOSE BLDC GLUCOMTR-MCNC: 141 MG/DL (ref 70–99)
GLUCOSE BLDC GLUCOMTR-MCNC: 142 MG/DL (ref 70–99)
GLUCOSE BLDC GLUCOMTR-MCNC: 142 MG/DL (ref 70–99)
GLUCOSE BLDC GLUCOMTR-MCNC: 143 MG/DL (ref 70–99)
GLUCOSE BLDC GLUCOMTR-MCNC: 143 MG/DL (ref 70–99)
GLUCOSE BLDC GLUCOMTR-MCNC: 144 MG/DL (ref 70–99)
GLUCOSE BLDC GLUCOMTR-MCNC: 147 MG/DL (ref 70–99)
GLUCOSE BLDC GLUCOMTR-MCNC: 152 MG/DL (ref 70–99)
GLUCOSE BLDC GLUCOMTR-MCNC: 159 MG/DL (ref 70–99)
GLUCOSE BLDC GLUCOMTR-MCNC: 161 MG/DL (ref 70–99)
GLUCOSE BLDC GLUCOMTR-MCNC: 161 MG/DL (ref 70–99)
GLUCOSE BLDC GLUCOMTR-MCNC: 162 MG/DL (ref 70–99)
GLUCOSE BLDC GLUCOMTR-MCNC: 168 MG/DL (ref 70–99)
GLUCOSE BLDC GLUCOMTR-MCNC: 170 MG/DL (ref 70–99)
GLUCOSE BLDC GLUCOMTR-MCNC: 174 MG/DL (ref 70–99)
GLUCOSE BLDC GLUCOMTR-MCNC: 176 MG/DL (ref 70–99)
GLUCOSE BLDC GLUCOMTR-MCNC: 176 MG/DL (ref 70–99)
GLUCOSE BLDC GLUCOMTR-MCNC: 181 MG/DL (ref 70–99)
GLUCOSE BLDC GLUCOMTR-MCNC: 197 MG/DL (ref 70–99)
GLUCOSE BLDC GLUCOMTR-MCNC: 200 MG/DL (ref 70–99)
GLUCOSE BLDC GLUCOMTR-MCNC: 200 MG/DL (ref 70–99)
GLUCOSE BLDC GLUCOMTR-MCNC: 215 MG/DL (ref 70–99)
GLUCOSE BLDC GLUCOMTR-MCNC: 218 MG/DL (ref 70–99)
GLUCOSE BLDC GLUCOMTR-MCNC: 60 MG/DL (ref 70–99)
GLUCOSE BLDC GLUCOMTR-MCNC: 70 MG/DL (ref 70–99)
GLUCOSE BLDC GLUCOMTR-MCNC: 71 MG/DL (ref 70–99)
GLUCOSE BLDC GLUCOMTR-MCNC: 78 MG/DL (ref 70–99)
GLUCOSE BLDC GLUCOMTR-MCNC: 85 MG/DL (ref 70–99)
GLUCOSE BLDC GLUCOMTR-MCNC: 86 MG/DL (ref 70–99)
GLUCOSE BLDC GLUCOMTR-MCNC: 86 MG/DL (ref 70–99)
GLUCOSE BLDC GLUCOMTR-MCNC: 87 MG/DL (ref 70–99)
GLUCOSE BLDC GLUCOMTR-MCNC: 90 MG/DL (ref 70–99)
GLUCOSE BLDC GLUCOMTR-MCNC: 94 MG/DL (ref 70–99)
GLUCOSE BLDC GLUCOMTR-MCNC: 95 MG/DL (ref 70–99)
GLUCOSE SERPL-MCNC: 102 MG/DL (ref 70–99)
GLUCOSE SERPL-MCNC: 104 MG/DL (ref 70–99)
GLUCOSE SERPL-MCNC: 107 MG/DL (ref 70–99)
GLUCOSE SERPL-MCNC: 111 MG/DL (ref 70–99)
GLUCOSE SERPL-MCNC: 118 MG/DL (ref 70–99)
GLUCOSE SERPL-MCNC: 130 MG/DL (ref 70–99)
GLUCOSE SERPL-MCNC: 130 MG/DL (ref 70–99)
GLUCOSE SERPL-MCNC: 131 MG/DL (ref 70–99)
GLUCOSE SERPL-MCNC: 140 MG/DL (ref 70–99)
GLUCOSE SERPL-MCNC: 150 MG/DL (ref 70–99)
GLUCOSE SERPL-MCNC: 155 MG/DL (ref 70–99)
GLUCOSE SERPL-MCNC: 156 MG/DL (ref 70–99)
GLUCOSE SERPL-MCNC: 165 MG/DL (ref 70–99)
GLUCOSE SERPL-MCNC: 168 MG/DL (ref 70–99)
GLUCOSE SERPL-MCNC: 182 MG/DL (ref 70–99)
GLUCOSE SERPL-MCNC: 185 MG/DL (ref 70–99)
GLUCOSE SERPL-MCNC: 186 MG/DL (ref 70–99)
GLUCOSE SERPL-MCNC: 204 MG/DL (ref 70–99)
GLUCOSE SERPL-MCNC: 209 MG/DL (ref 70–99)
GLUCOSE SERPL-MCNC: 81 MG/DL (ref 70–99)
GLUCOSE SERPL-MCNC: 81 MG/DL (ref 70–99)
GLUCOSE SERPL-MCNC: 96 MG/DL (ref 70–99)
GLUCOSE UR STRIP-MCNC: NEGATIVE MG/DL
GRAM STN SPEC: NORMAL
GRAM STN SPEC: NORMAL
GRAN CASTS #/AREA URNS LPF: 1 /LPF
HBA1C MFR BLD: 5 % (ref 0–5.6)
HCO3 BLD-SCNC: 18 MMOL/L (ref 21–28)
HCO3 BLD-SCNC: 18 MMOL/L (ref 21–28)
HCO3 BLD-SCNC: 19 MMOL/L (ref 21–28)
HCO3 BLD-SCNC: 20 MMOL/L (ref 21–28)
HCO3 BLD-SCNC: 21 MMOL/L (ref 21–28)
HCO3 BLD-SCNC: 22 MMOL/L (ref 21–28)
HCO3 BLD-SCNC: 23 MMOL/L (ref 21–28)
HCO3 BLD-SCNC: 24 MMOL/L (ref 21–28)
HCO3 BLD-SCNC: 25 MMOL/L (ref 21–28)
HCO3 BLDA-SCNC: 17 MMOL/L (ref 21–28)
HCO3 BLDA-SCNC: 19 MMOL/L (ref 21–28)
HCO3 BLDA-SCNC: 20 MMOL/L (ref 21–28)
HCO3 BLDA-SCNC: 22 MMOL/L (ref 21–28)
HCO3 BLDA-SCNC: 23 MMOL/L (ref 21–28)
HCO3 BLDA-SCNC: 24 MMOL/L (ref 21–28)
HCO3 BLDA-SCNC: NORMAL MMOL/L (ref 21–28)
HCO3 BLDV-SCNC: 20 MMOL/L (ref 21–28)
HCO3 BLDV-SCNC: 21 MMOL/L (ref 21–28)
HCO3 BLDV-SCNC: 21 MMOL/L (ref 21–28)
HCO3 BLDV-SCNC: 23 MMOL/L (ref 21–28)
HCO3 BLDV-SCNC: 24 MMOL/L (ref 21–28)
HCO3 BLDV-SCNC: 24 MMOL/L (ref 21–28)
HCO3 BLDV-SCNC: 25 MMOL/L (ref 21–28)
HCO3 BLDV-SCNC: 25 MMOL/L (ref 21–28)
HCT VFR BLD AUTO: 21.3 % (ref 40–53)
HCT VFR BLD AUTO: 23 % (ref 40–53)
HCT VFR BLD AUTO: 24.1 % (ref 40–53)
HCT VFR BLD AUTO: 24.4 % (ref 40–53)
HCT VFR BLD AUTO: 24.9 % (ref 40–53)
HCT VFR BLD AUTO: 25.3 % (ref 40–53)
HCT VFR BLD AUTO: 25.6 % (ref 40–53)
HCT VFR BLD AUTO: 25.7 % (ref 40–53)
HCT VFR BLD AUTO: 25.8 % (ref 40–53)
HCT VFR BLD AUTO: 26 % (ref 40–53)
HCT VFR BLD AUTO: 26.2 % (ref 40–53)
HCT VFR BLD AUTO: 26.2 % (ref 40–53)
HCT VFR BLD AUTO: 26.3 % (ref 40–53)
HCT VFR BLD AUTO: 26.3 % (ref 40–53)
HCT VFR BLD AUTO: 26.4 % (ref 40–53)
HCT VFR BLD AUTO: 26.9 % (ref 40–53)
HCT VFR BLD AUTO: 27.2 % (ref 40–53)
HCT VFR BLD AUTO: 27.4 % (ref 40–53)
HCT VFR BLD AUTO: 27.6 % (ref 40–53)
HCT VFR BLD AUTO: 28.1 % (ref 40–53)
HCT VFR BLD AUTO: 28.8 % (ref 40–53)
HCT VFR BLD AUTO: 29.6 % (ref 40–53)
HCT VFR BLD AUTO: 30.6 % (ref 40–53)
HCT VFR BLD AUTO: 34.1 % (ref 40–53)
HCT VFR BLD CALC: 20 %PCV (ref 40–53)
HCT VFR BLD CALC: 28 %PCV (ref 40–53)
HCT VFR BLD CALC: 30 %PCV (ref 40–53)
HCT VFR BLD CALC: 42 %PCV (ref 40–53)
HGB BLD CALC-MCNC: 10.2 G/DL (ref 13.3–17.7)
HGB BLD CALC-MCNC: 14.3 G/DL (ref 13.3–17.7)
HGB BLD CALC-MCNC: 6.8 G/DL (ref 13.3–17.7)
HGB BLD CALC-MCNC: 9.5 G/DL (ref 13.3–17.7)
HGB BLD-MCNC: 10 G/DL (ref 13.3–17.7)
HGB BLD-MCNC: 10.9 G/DL (ref 13.3–17.7)
HGB BLD-MCNC: 7.1 G/DL (ref 13.3–17.7)
HGB BLD-MCNC: 7.8 G/DL (ref 13.3–17.7)
HGB BLD-MCNC: 8.1 G/DL (ref 13.3–17.7)
HGB BLD-MCNC: 8.1 G/DL (ref 13.3–17.7)
HGB BLD-MCNC: 8.2 G/DL (ref 13.3–17.7)
HGB BLD-MCNC: 8.5 G/DL (ref 13.3–17.7)
HGB BLD-MCNC: 8.6 G/DL (ref 13.3–17.7)
HGB BLD-MCNC: 8.6 G/DL (ref 13.3–17.7)
HGB BLD-MCNC: 8.7 G/DL (ref 13.3–17.7)
HGB BLD-MCNC: 8.9 G/DL (ref 13.3–17.7)
HGB BLD-MCNC: 9 G/DL (ref 13.3–17.7)
HGB BLD-MCNC: 9 G/DL (ref 13.3–17.7)
HGB BLD-MCNC: 9.1 G/DL (ref 13.3–17.7)
HGB BLD-MCNC: 9.2 G/DL (ref 13.3–17.7)
HGB BLD-MCNC: 9.4 G/DL (ref 13.3–17.7)
HGB BLD-MCNC: 9.8 G/DL (ref 13.3–17.7)
HGB FREE PLAS-MCNC: 100 MG/DL
HGB FREE PLAS-MCNC: 100 MG/DL
HGB FREE PLAS-MCNC: 30 MG/DL
HGB FREE PLAS-MCNC: 70 MG/DL
HGB FREE PLAS-MCNC: <30 MG/DL
HGB FREE PLAS-MCNC: <30 MG/DL
HGB UR QL STRIP: ABNORMAL
IMM GRANULOCYTES # BLD: 0 10E9/L (ref 0–0.4)
IMM GRANULOCYTES # BLD: 0.4 10E9/L (ref 0–0.4)
IMM GRANULOCYTES NFR BLD: 0.7 %
IMM GRANULOCYTES NFR BLD: 2.6 %
INR PPP: 1.26 (ref 0.86–1.14)
INR PPP: 1.34 (ref 0.86–1.14)
INR PPP: 1.55 (ref 0.86–1.14)
INR PPP: 1.59 (ref 0.86–1.14)
INR PPP: 1.66 (ref 0.86–1.14)
INR PPP: 1.7 (ref 0.86–1.14)
INR PPP: 1.71 (ref 0.86–1.14)
INR PPP: 1.77 (ref 0.86–1.14)
INR PPP: 1.79 (ref 0.86–1.14)
INR PPP: 1.83 (ref 0.86–1.14)
INR PPP: 1.86 (ref 0.86–1.14)
INR PPP: 2 (ref 0.86–1.14)
INR PPP: 2.02 (ref 0.86–1.14)
INR PPP: 2.22 (ref 0.86–1.14)
INR PPP: 2.46 (ref 0.86–1.14)
INR PPP: 2.63 (ref 0.86–1.14)
INR PPP: 2.76 (ref 0.86–1.14)
INR PPP: 2.97 (ref 0.86–1.14)
INR PPP: 3 (ref 0.86–1.14)
INR PPP: 3.22 (ref 0.86–1.14)
INR PPP: 3.32 (ref 0.86–1.14)
INR PPP: 3.68 (ref 0.86–1.14)
INTERPRETATION ECG - MUSE: NORMAL
K TIME TO SPEC CLOT STRENGTH: 1.3 MINUTE (ref 1–3)
K TIME TO SPEC CLOT STRENGTH: 1.5 MINUTE (ref 1–3)
K TIME TO SPEC CLOT STRENGTH: 1.6 MINUTE (ref 1–3)
KCT BLD-ACNC: 130 SEC (ref 75–150)
KCT BLD-ACNC: 138 SEC (ref 75–150)
KCT BLD-ACNC: 142 SEC (ref 75–150)
KCT BLD-ACNC: 143 SEC (ref 75–150)
KCT BLD-ACNC: 146 SEC (ref 75–150)
KCT BLD-ACNC: 150 SEC (ref 75–150)
KCT BLD-ACNC: 150 SEC (ref 75–150)
KCT BLD-ACNC: 155 SEC (ref 75–150)
KCT BLD-ACNC: 159 SEC (ref 75–150)
KCT BLD-ACNC: 159 SEC (ref 75–150)
KCT BLD-ACNC: 162 SEC (ref 75–150)
KCT BLD-ACNC: 167 SEC (ref 75–150)
KCT BLD-ACNC: 171 SEC (ref 75–150)
KCT BLD-ACNC: 175 SEC (ref 75–150)
KCT BLD-ACNC: 179 SEC (ref 75–150)
KCT BLD-ACNC: 183 SEC (ref 75–150)
KCT BLD-ACNC: 187 SEC (ref 75–150)
KCT BLD-ACNC: 191 SEC (ref 75–150)
KCT BLD-ACNC: 195 SEC (ref 75–150)
KCT BLD-ACNC: 195 SEC (ref 75–150)
KCT BLD-ACNC: 199 SEC (ref 75–150)
KCT BLD-ACNC: 199 SEC (ref 75–150)
KCT BLD-ACNC: 203 SEC (ref 75–150)
KCT BLD-ACNC: 203 SEC (ref 75–150)
KCT BLD-ACNC: 215 SEC (ref 75–150)
KCT BLD-ACNC: 231 SEC (ref 75–150)
KCT BLD-ACNC: 235 SEC (ref 75–150)
KCT BLD-ACNC: 275 SEC (ref 75–150)
KCT BLD-ACNC: 275 SEC (ref 75–150)
KCT BLD-ACNC: 312 SEC (ref 75–150)
KCT BLD-ACNC: 364 SEC (ref 75–150)
KETONES UR STRIP-MCNC: NEGATIVE MG/DL
LACTATE BLD-SCNC: 1.5 MMOL/L (ref 0.7–2)
LACTATE BLD-SCNC: 1.6 MMOL/L (ref 0.7–2)
LACTATE BLD-SCNC: 2.1 MMOL/L (ref 0.7–2)
LACTATE BLD-SCNC: 2.4 MMOL/L (ref 0.7–2)
LACTATE BLD-SCNC: 2.4 MMOL/L (ref 0.7–2)
LACTATE BLD-SCNC: 2.5 MMOL/L (ref 0.7–2)
LACTATE BLD-SCNC: 2.6 MMOL/L (ref 0.7–2)
LACTATE BLD-SCNC: 3.4 MMOL/L (ref 0.7–2)
LACTATE BLD-SCNC: 3.8 MMOL/L (ref 0.7–2)
LACTATE BLD-SCNC: 3.9 MMOL/L (ref 0.7–2)
LACTATE BLD-SCNC: 4.1 MMOL/L (ref 0.7–2)
LACTATE BLD-SCNC: 4.4 MMOL/L (ref 0.7–2.1)
LACTATE BLD-SCNC: 4.5 MMOL/L (ref 0.7–2)
LACTATE BLD-SCNC: 4.5 MMOL/L (ref 0.7–2)
LACTATE BLD-SCNC: 4.6 MMOL/L (ref 0.7–2)
LACTATE BLD-SCNC: 4.7 MMOL/L (ref 0.7–2)
LACTATE BLD-SCNC: 4.7 MMOL/L (ref 0.7–2)
LACTATE BLD-SCNC: 4.8 MMOL/L (ref 0.7–2)
LACTATE BLD-SCNC: 4.9 MMOL/L (ref 0.7–2)
LACTATE BLD-SCNC: 5 MMOL/L (ref 0.7–2)
LACTATE BLD-SCNC: 5 MMOL/L (ref 0.7–2)
LACTATE BLD-SCNC: 5.2 MMOL/L (ref 0.7–2)
LACTATE BLD-SCNC: 5.2 MMOL/L (ref 0.7–2.1)
LACTATE BLD-SCNC: 5.3 MMOL/L (ref 0.7–2)
LACTATE BLD-SCNC: 5.3 MMOL/L (ref 0.7–2)
LACTATE BLD-SCNC: 5.5 MMOL/L (ref 0.7–2.1)
LACTATE BLD-SCNC: 5.7 MMOL/L (ref 0.7–2)
LACTATE BLD-SCNC: 5.9 MMOL/L (ref 0.7–2)
LACTATE BLD-SCNC: 9 MMOL/L (ref 0.7–2.1)
LACTATE BLD-SCNC: 9.1 MMOL/L (ref 0.7–2)
LDH SERPL L TO P-CCNC: 2104 U/L (ref 85–227)
LDH SERPL L TO P-CCNC: 2538 U/L (ref 85–227)
LDH SERPL L TO P-CCNC: 3498 U/L (ref 85–227)
LDH SERPL L TO P-CCNC: 5412 U/L (ref 85–227)
LDH SERPL L TO P-CCNC: 729 U/L (ref 85–227)
LDH SERPL L TO P-CCNC: 997 U/L (ref 85–227)
LEUKOCYTE ESTERASE UR QL STRIP: NEGATIVE
LMWH PPP CHRO-ACNC: 0.1 IU/ML
LMWH PPP CHRO-ACNC: 0.12 IU/ML
LMWH PPP CHRO-ACNC: 1.05 IU/ML
LMWH PPP CHRO-ACNC: <0.1 IU/ML
LY30 LYSIS AT 30 MINUTES: 0 % (ref 0–8)
LY60 LYSIS AT 60 MINUTES: 0.3 % (ref 0–15)
LY60 LYSIS AT 60 MINUTES: 0.5 % (ref 0–15)
LY60 LYSIS AT 60 MINUTES: 0.6 % (ref 0–15)
LYMPHOCYTES # BLD AUTO: 0.2 10E9/L (ref 0.8–5.3)
LYMPHOCYTES # BLD AUTO: 0.3 10E9/L (ref 0.8–5.3)
LYMPHOCYTES # BLD AUTO: 0.4 10E9/L (ref 0.8–5.3)
LYMPHOCYTES # BLD AUTO: 0.5 10E9/L (ref 0.8–5.3)
LYMPHOCYTES # BLD AUTO: 0.6 10E9/L (ref 0.8–5.3)
LYMPHOCYTES # BLD AUTO: 0.8 10E9/L (ref 0.8–5.3)
LYMPHOCYTES # BLD AUTO: 1 10E9/L (ref 0.8–5.3)
LYMPHOCYTES # BLD AUTO: 1 10E9/L (ref 0.8–5.3)
LYMPHOCYTES # BLD AUTO: 1.1 10E9/L (ref 0.8–5.3)
LYMPHOCYTES # BLD AUTO: 2.1 10E9/L (ref 0.8–5.3)
LYMPHOCYTES NFR BLD AUTO: 1.7 %
LYMPHOCYTES NFR BLD AUTO: 11.3 %
LYMPHOCYTES NFR BLD AUTO: 15.8 %
LYMPHOCYTES NFR BLD AUTO: 16.7 %
LYMPHOCYTES NFR BLD AUTO: 3.5 %
LYMPHOCYTES NFR BLD AUTO: 3.5 %
LYMPHOCYTES NFR BLD AUTO: 4.4 %
LYMPHOCYTES NFR BLD AUTO: 4.6 %
LYMPHOCYTES NFR BLD AUTO: 6.1 %
LYMPHOCYTES NFR BLD AUTO: 6.1 %
LYMPHOCYTES NFR BLD AUTO: 7 %
LYMPHOCYTES NFR BLD AUTO: 7.1 %
LYMPHOCYTES NFR BLD AUTO: 7.1 %
LYMPHOCYTES NFR BLD AUTO: 8 %
LYMPHOCYTES NFR BLD AUTO: 8.8 %
Lab: NORMAL
Lab: NORMAL
MA MAXIMUM CLOT STRENGTH: 58.6 MM (ref 50–70)
MA MAXIMUM CLOT STRENGTH: 64 MM (ref 50–70)
MA MAXIMUM CLOT STRENGTH: 65.8 MM (ref 50–70)
MAGNESIUM SERPL-MCNC: 1.7 MG/DL (ref 1.6–2.3)
MAGNESIUM SERPL-MCNC: 1.8 MG/DL (ref 1.6–2.3)
MAGNESIUM SERPL-MCNC: 1.9 MG/DL (ref 1.6–2.3)
MAGNESIUM SERPL-MCNC: 2.2 MG/DL (ref 1.6–2.3)
MAGNESIUM SERPL-MCNC: 2.2 MG/DL (ref 1.6–2.3)
MAGNESIUM SERPL-MCNC: 2.4 MG/DL (ref 1.6–2.3)
MAGNESIUM SERPL-MCNC: 2.5 MG/DL (ref 1.6–2.3)
MAGNESIUM SERPL-MCNC: 2.6 MG/DL (ref 1.6–2.3)
MAGNESIUM SERPL-MCNC: 2.7 MG/DL (ref 1.6–2.3)
MAGNESIUM SERPL-MCNC: 2.8 MG/DL (ref 1.6–2.3)
MAGNESIUM SERPL-MCNC: 3 MG/DL (ref 1.6–2.3)
MAGNESIUM SERPL-MCNC: 3.2 MG/DL (ref 1.6–2.3)
MCH RBC QN AUTO: 28.3 PG (ref 26.5–33)
MCH RBC QN AUTO: 28.7 PG (ref 26.5–33)
MCH RBC QN AUTO: 28.8 PG (ref 26.5–33)
MCH RBC QN AUTO: 28.8 PG (ref 26.5–33)
MCH RBC QN AUTO: 28.9 PG (ref 26.5–33)
MCH RBC QN AUTO: 29.2 PG (ref 26.5–33)
MCH RBC QN AUTO: 29.3 PG (ref 26.5–33)
MCH RBC QN AUTO: 29.3 PG (ref 26.5–33)
MCH RBC QN AUTO: 29.4 PG (ref 26.5–33)
MCH RBC QN AUTO: 29.4 PG (ref 26.5–33)
MCH RBC QN AUTO: 29.5 PG (ref 26.5–33)
MCH RBC QN AUTO: 29.5 PG (ref 26.5–33)
MCH RBC QN AUTO: 29.6 PG (ref 26.5–33)
MCH RBC QN AUTO: 29.7 PG (ref 26.5–33)
MCH RBC QN AUTO: 29.8 PG (ref 26.5–33)
MCH RBC QN AUTO: 29.8 PG (ref 26.5–33)
MCH RBC QN AUTO: 29.9 PG (ref 26.5–33)
MCH RBC QN AUTO: 30.1 PG (ref 26.5–33)
MCH RBC QN AUTO: 30.4 PG (ref 26.5–33)
MCHC RBC AUTO-ENTMCNC: 32 G/DL (ref 31.5–36.5)
MCHC RBC AUTO-ENTMCNC: 32 G/DL (ref 31.5–36.5)
MCHC RBC AUTO-ENTMCNC: 32.3 G/DL (ref 31.5–36.5)
MCHC RBC AUTO-ENTMCNC: 32.3 G/DL (ref 31.5–36.5)
MCHC RBC AUTO-ENTMCNC: 32.6 G/DL (ref 31.5–36.5)
MCHC RBC AUTO-ENTMCNC: 32.7 G/DL (ref 31.5–36.5)
MCHC RBC AUTO-ENTMCNC: 32.7 G/DL (ref 31.5–36.5)
MCHC RBC AUTO-ENTMCNC: 32.8 G/DL (ref 31.5–36.5)
MCHC RBC AUTO-ENTMCNC: 32.9 G/DL (ref 31.5–36.5)
MCHC RBC AUTO-ENTMCNC: 32.9 G/DL (ref 31.5–36.5)
MCHC RBC AUTO-ENTMCNC: 33.1 G/DL (ref 31.5–36.5)
MCHC RBC AUTO-ENTMCNC: 33.2 G/DL (ref 31.5–36.5)
MCHC RBC AUTO-ENTMCNC: 33.3 G/DL (ref 31.5–36.5)
MCHC RBC AUTO-ENTMCNC: 33.6 G/DL (ref 31.5–36.5)
MCHC RBC AUTO-ENTMCNC: 33.6 G/DL (ref 31.5–36.5)
MCHC RBC AUTO-ENTMCNC: 33.7 G/DL (ref 31.5–36.5)
MCHC RBC AUTO-ENTMCNC: 33.9 G/DL (ref 31.5–36.5)
MCHC RBC AUTO-ENTMCNC: 34 G/DL (ref 31.5–36.5)
MCV RBC AUTO: 85 FL (ref 78–100)
MCV RBC AUTO: 86 FL (ref 78–100)
MCV RBC AUTO: 88 FL (ref 78–100)
MCV RBC AUTO: 88 FL (ref 78–100)
MCV RBC AUTO: 89 FL (ref 78–100)
MCV RBC AUTO: 90 FL (ref 78–100)
MCV RBC AUTO: 91 FL (ref 78–100)
MCV RBC AUTO: 92 FL (ref 78–100)
METAMYELOCYTES # BLD: 0.1 10E9/L
METAMYELOCYTES # BLD: 0.2 10E9/L
METAMYELOCYTES NFR BLD MANUAL: 0.9 %
METAMYELOCYTES NFR BLD MANUAL: 1.7 %
METAMYELOCYTES NFR BLD MANUAL: 1.8 %
MICROCYTES BLD QL SMEAR: PRESENT
MONOCYTES # BLD AUTO: 0 10E9/L (ref 0–1.3)
MONOCYTES # BLD AUTO: 0 10E9/L (ref 0–1.3)
MONOCYTES # BLD AUTO: 0.1 10E9/L (ref 0–1.3)
MONOCYTES # BLD AUTO: 0.1 10E9/L (ref 0–1.3)
MONOCYTES # BLD AUTO: 0.2 10E9/L (ref 0–1.3)
MONOCYTES # BLD AUTO: 0.3 10E9/L (ref 0–1.3)
MONOCYTES # BLD AUTO: 0.3 10E9/L (ref 0–1.3)
MONOCYTES # BLD AUTO: 0.4 10E9/L (ref 0–1.3)
MONOCYTES # BLD AUTO: 0.4 10E9/L (ref 0–1.3)
MONOCYTES # BLD AUTO: 0.5 10E9/L (ref 0–1.3)
MONOCYTES # BLD AUTO: 0.6 10E9/L (ref 0–1.3)
MONOCYTES # BLD AUTO: 0.8 10E9/L (ref 0–1.3)
MONOCYTES # BLD AUTO: 0.8 10E9/L (ref 0–1.3)
MONOCYTES NFR BLD AUTO: 0 %
MONOCYTES NFR BLD AUTO: 0 %
MONOCYTES NFR BLD AUTO: 0.9 %
MONOCYTES NFR BLD AUTO: 1.7 %
MONOCYTES NFR BLD AUTO: 1.8 %
MONOCYTES NFR BLD AUTO: 2.6 %
MONOCYTES NFR BLD AUTO: 2.7 %
MONOCYTES NFR BLD AUTO: 2.7 %
MONOCYTES NFR BLD AUTO: 3.9 %
MONOCYTES NFR BLD AUTO: 4.3 %
MONOCYTES NFR BLD AUTO: 4.4 %
MONOCYTES NFR BLD AUTO: 5.3 %
MONOCYTES NFR BLD AUTO: 7 %
MONOCYTES NFR BLD AUTO: 7.9 %
MONOCYTES NFR BLD AUTO: 8 %
MONOCYTES NFR BLD AUTO: 8.8 %
MRSA DNA SPEC QL NAA+PROBE: NEGATIVE
MYELOCYTES # BLD: 0.1 10E9/L
MYELOCYTES # BLD: 0.2 10E9/L
MYELOCYTES # BLD: 0.2 10E9/L
MYELOCYTES # BLD: 0.3 10E9/L
MYELOCYTES # BLD: 0.3 10E9/L
MYELOCYTES NFR BLD MANUAL: 0.9 %
MYELOCYTES NFR BLD MANUAL: 1.8 %
MYELOCYTES NFR BLD MANUAL: 2.6 %
MYELOCYTES NFR BLD MANUAL: 2.7 %
NEUTROPHILS # BLD AUTO: 10.1 10E9/L (ref 1.6–8.3)
NEUTROPHILS # BLD AUTO: 10.2 10E9/L (ref 1.6–8.3)
NEUTROPHILS # BLD AUTO: 10.3 10E9/L (ref 1.6–8.3)
NEUTROPHILS # BLD AUTO: 10.7 10E9/L (ref 1.6–8.3)
NEUTROPHILS # BLD AUTO: 10.9 10E9/L (ref 1.6–8.3)
NEUTROPHILS # BLD AUTO: 10.9 10E9/L (ref 1.6–8.3)
NEUTROPHILS # BLD AUTO: 5 10E9/L (ref 1.6–8.3)
NEUTROPHILS # BLD AUTO: 5.4 10E9/L (ref 1.6–8.3)
NEUTROPHILS # BLD AUTO: 5.5 10E9/L (ref 1.6–8.3)
NEUTROPHILS # BLD AUTO: 6 10E9/L (ref 1.6–8.3)
NEUTROPHILS # BLD AUTO: 6.7 10E9/L (ref 1.6–8.3)
NEUTROPHILS # BLD AUTO: 7.1 10E9/L (ref 1.6–8.3)
NEUTROPHILS # BLD AUTO: 7.4 10E9/L (ref 1.6–8.3)
NEUTROPHILS # BLD AUTO: 7.6 10E9/L (ref 1.6–8.3)
NEUTROPHILS # BLD AUTO: 8 10E9/L (ref 1.6–8.3)
NEUTROPHILS # BLD AUTO: 8.9 10E9/L (ref 1.6–8.3)
NEUTROPHILS # BLD AUTO: 9 10E9/L (ref 1.6–8.3)
NEUTROPHILS # BLD AUTO: 9.4 10E9/L (ref 1.6–8.3)
NEUTROPHILS NFR BLD AUTO: 76.7 %
NEUTROPHILS NFR BLD AUTO: 81.5 %
NEUTROPHILS NFR BLD AUTO: 82.1 %
NEUTROPHILS NFR BLD AUTO: 82.4 %
NEUTROPHILS NFR BLD AUTO: 82.6 %
NEUTROPHILS NFR BLD AUTO: 83.3 %
NEUTROPHILS NFR BLD AUTO: 86.6 %
NEUTROPHILS NFR BLD AUTO: 86.9 %
NEUTROPHILS NFR BLD AUTO: 87.5 %
NEUTROPHILS NFR BLD AUTO: 87.6 %
NEUTROPHILS NFR BLD AUTO: 89.4 %
NEUTROPHILS NFR BLD AUTO: 89.5 %
NEUTROPHILS NFR BLD AUTO: 89.5 %
NEUTROPHILS NFR BLD AUTO: 91.2 %
NEUTROPHILS NFR BLD AUTO: 91.4 %
NEUTROPHILS NFR BLD AUTO: 91.8 %
NEUTROPHILS NFR BLD AUTO: 92 %
NEUTROPHILS NFR BLD AUTO: 93.8 %
NITRATE UR QL: NEGATIVE
NRBC # BLD AUTO: 0 10*3/UL
NRBC # BLD AUTO: 0 10*3/UL
NRBC # BLD AUTO: 0.1 10*3/UL
NRBC # BLD AUTO: 0.2 10*3/UL
NRBC # BLD AUTO: 0.2 10*3/UL
NRBC # BLD AUTO: 0.4 10*3/UL
NRBC # BLD AUTO: 0.5 10*3/UL
NRBC # BLD AUTO: 0.6 10*3/UL
NRBC # BLD AUTO: 0.7 10*3/UL
NRBC # BLD AUTO: 0.9 10*3/UL
NRBC # BLD AUTO: 1.2 10*3/UL
NRBC BLD AUTO-RTO: 0 /100
NRBC BLD AUTO-RTO: 0 /100
NRBC BLD AUTO-RTO: 1 /100
NRBC BLD AUTO-RTO: 11 /100
NRBC BLD AUTO-RTO: 13 /100
NRBC BLD AUTO-RTO: 2 /100
NRBC BLD AUTO-RTO: 3 /100
NRBC BLD AUTO-RTO: 3 /100
NRBC BLD AUTO-RTO: 4 /100
NRBC BLD AUTO-RTO: 5 /100
NRBC BLD AUTO-RTO: 8 /100
NSE SERPL-MCNC: 26.1 UG/L (ref 3.7–8.9)
NUM BPU REQUESTED: 0
NUM BPU REQUESTED: 1
NUM BPU REQUESTED: 10
NUM BPU REQUESTED: 2
NUM BPU REQUESTED: 2
NUM BPU REQUESTED: 20
NUM BPU REQUESTED: 3
NUM BPU REQUESTED: 5
O2/TOTAL GAS SETTING VFR VENT: 100 %
O2/TOTAL GAS SETTING VFR VENT: 100 %
O2/TOTAL GAS SETTING VFR VENT: 40 %
O2/TOTAL GAS SETTING VFR VENT: 60 %
O2/TOTAL GAS SETTING VFR VENT: ABNORMAL %
OPIATES UR QL SCN: NEGATIVE
OVALOCYTES BLD QL SMEAR: SLIGHT
OXYHGB MFR BLD: 89 % (ref 92–100)
OXYHGB MFR BLD: 90 % (ref 92–100)
OXYHGB MFR BLD: 90 % (ref 92–100)
OXYHGB MFR BLD: 91 % (ref 92–100)
OXYHGB MFR BLD: 92 % (ref 92–100)
OXYHGB MFR BLD: 93 % (ref 92–100)
OXYHGB MFR BLD: 94 % (ref 92–100)
OXYHGB MFR BLD: 95 % (ref 92–100)
OXYHGB MFR BLD: 96 % (ref 92–100)
OXYHGB MFR BLD: 97 % (ref 92–100)
OXYHGB MFR BLD: 98 % (ref 92–100)
OXYHGB MFR BLD: 99 % (ref 92–100)
OXYHGB MFR BLD: 99 % (ref 92–100)
OXYHGB MFR BLDA: 90 % (ref 75–100)
OXYHGB MFR BLDA: 93 % (ref 75–100)
OXYHGB MFR BLDA: 94 % (ref 75–100)
OXYHGB MFR BLDA: 95 % (ref 75–100)
OXYHGB MFR BLDA: 95 % (ref 75–100)
OXYHGB MFR BLDA: 96 % (ref 75–100)
OXYHGB MFR BLDA: 97 % (ref 75–100)
OXYHGB MFR BLDA: 98 % (ref 75–100)
OXYHGB MFR BLDA: NORMAL % (ref 75–100)
OXYHGB MFR BLDV: 61 %
OXYHGB MFR BLDV: 62 %
OXYHGB MFR BLDV: 63 %
OXYHGB MFR BLDV: 63 %
OXYHGB MFR BLDV: 64 %
OXYHGB MFR BLDV: 66 %
OXYHGB MFR BLDV: 67 %
OXYHGB MFR BLDV: 72 %
OXYHGB MFR BLDV: 75 %
OXYHGB MFR BLDV: 75 %
PCO2 BLD: 28 MM HG (ref 35–45)
PCO2 BLD: 29 MM HG (ref 35–45)
PCO2 BLD: 29 MM HG (ref 35–45)
PCO2 BLD: 30 MM HG (ref 35–45)
PCO2 BLD: 31 MM HG (ref 35–45)
PCO2 BLD: 32 MM HG (ref 35–45)
PCO2 BLD: 32 MM HG (ref 35–45)
PCO2 BLD: 33 MM HG (ref 35–45)
PCO2 BLD: 34 MM HG (ref 35–45)
PCO2 BLD: 35 MM HG (ref 35–45)
PCO2 BLD: 36 MM HG (ref 35–45)
PCO2 BLD: 37 MM HG (ref 35–45)
PCO2 BLD: 38 MM HG (ref 35–45)
PCO2 BLD: 39 MM HG (ref 35–45)
PCO2 BLD: 40 MM HG (ref 35–45)
PCO2 BLD: 41 MM HG (ref 35–45)
PCO2 BLD: 42 MM HG (ref 35–45)
PCO2 BLD: 43 MM HG (ref 35–45)
PCO2 BLD: 44 MM HG (ref 35–45)
PCO2 BLD: 46 MM HG (ref 35–45)
PCO2 BLD: 46 MM HG (ref 35–45)
PCO2 BLD: 47 MM HG (ref 35–45)
PCO2 BLD: 48 MM HG (ref 35–45)
PCO2 BLD: 50 MM HG (ref 35–45)
PCO2 BLD: 66 MM HG (ref 35–45)
PCO2 BLD: 66 MM HG (ref 35–45)
PCO2 BLDA: 27 MM HG (ref 35–45)
PCO2 BLDA: 27 MM HG (ref 35–45)
PCO2 BLDA: 28 MM HG (ref 35–45)
PCO2 BLDA: 30 MM HG (ref 35–45)
PCO2 BLDA: 31 MM HG (ref 35–45)
PCO2 BLDA: 32 MM HG (ref 35–45)
PCO2 BLDA: 32 MM HG (ref 35–45)
PCO2 BLDA: 35 MM HG (ref 35–45)
PCO2 BLDA: 36 MM HG (ref 35–45)
PCO2 BLDA: 38 MM HG (ref 35–45)
PCO2 BLDA: 39 MM HG (ref 35–45)
PCO2 BLDA: 39 MM HG (ref 35–45)
PCO2 BLDA: 43 MM HG (ref 35–45)
PCO2 BLDA: 45 MM HG (ref 35–45)
PCO2 BLDA: NORMAL MM HG (ref 35–45)
PCO2 BLDV: 36 MM HG (ref 40–50)
PCO2 BLDV: 37 MM HG (ref 40–50)
PCO2 BLDV: 38 MM HG (ref 40–50)
PCO2 BLDV: 38 MM HG (ref 40–50)
PCO2 BLDV: 41 MM HG (ref 40–50)
PCO2 BLDV: 43 MM HG (ref 40–50)
PCO2 BLDV: 44 MM HG (ref 40–50)
PCO2 BLDV: 44 MM HG (ref 40–50)
PCO2 BLDV: 46 MM HG (ref 40–50)
PCO2 BLDV: 50 MM HG (ref 40–50)
PCO2 BLDV: 52 MM HG (ref 40–50)
PH BLD: 7.04 PH (ref 7.35–7.45)
PH BLD: 7.04 PH (ref 7.35–7.45)
PH BLD: 7.24 PH (ref 7.35–7.45)
PH BLD: 7.26 PH (ref 7.35–7.45)
PH BLD: 7.26 PH (ref 7.35–7.45)
PH BLD: 7.27 PH (ref 7.35–7.45)
PH BLD: 7.28 PH (ref 7.35–7.45)
PH BLD: 7.28 PH (ref 7.35–7.45)
PH BLD: 7.29 PH (ref 7.35–7.45)
PH BLD: 7.3 PH (ref 7.35–7.45)
PH BLD: 7.31 PH (ref 7.35–7.45)
PH BLD: 7.32 PH (ref 7.35–7.45)
PH BLD: 7.33 PH (ref 7.35–7.45)
PH BLD: 7.34 PH (ref 7.35–7.45)
PH BLD: 7.35 PH (ref 7.35–7.45)
PH BLD: 7.36 PH (ref 7.35–7.45)
PH BLD: 7.37 PH (ref 7.35–7.45)
PH BLD: 7.38 PH (ref 7.35–7.45)
PH BLD: 7.39 PH (ref 7.35–7.45)
PH BLD: 7.39 PH (ref 7.35–7.45)
PH BLD: 7.4 PH (ref 7.35–7.45)
PH BLD: 7.41 PH (ref 7.35–7.45)
PH BLD: 7.42 PH (ref 7.35–7.45)
PH BLD: 7.43 PH (ref 7.35–7.45)
PH BLD: 7.44 PH (ref 7.35–7.45)
PH BLD: 7.44 PH (ref 7.35–7.45)
PH BLD: 7.45 PH (ref 7.35–7.45)
PH BLD: 7.46 PH (ref 7.35–7.45)
PH BLD: 7.47 PH (ref 7.35–7.45)
PH BLD: 7.48 PH (ref 7.35–7.45)
PH BLDA: 7.31 PH (ref 7.35–7.45)
PH BLDA: 7.37 PH (ref 7.35–7.45)
PH BLDA: 7.37 PH (ref 7.35–7.45)
PH BLDA: 7.39 PH (ref 7.35–7.45)
PH BLDA: 7.4 PH (ref 7.35–7.45)
PH BLDA: 7.41 PH (ref 7.35–7.45)
PH BLDA: 7.42 PH (ref 7.35–7.45)
PH BLDA: 7.44 PH (ref 7.35–7.45)
PH BLDA: 7.44 PH (ref 7.35–7.45)
PH BLDA: 7.45 PH (ref 7.35–7.45)
PH BLDA: 7.45 PH (ref 7.35–7.45)
PH BLDA: 7.46 PH (ref 7.35–7.45)
PH BLDA: NORMAL PH (ref 7.35–7.45)
PH BLDV: 7.25 PH (ref 7.32–7.43)
PH BLDV: 7.26 PH (ref 7.32–7.43)
PH BLDV: 7.27 PH (ref 7.32–7.43)
PH BLDV: 7.28 PH (ref 7.32–7.43)
PH BLDV: 7.33 PH (ref 7.32–7.43)
PH BLDV: 7.35 PH (ref 7.32–7.43)
PH BLDV: 7.35 PH (ref 7.32–7.43)
PH BLDV: 7.36 PH (ref 7.32–7.43)
PH BLDV: 7.38 PH (ref 7.32–7.43)
PH BLDV: 7.4 PH (ref 7.32–7.43)
PH BLDV: 7.43 PH (ref 7.32–7.43)
PH UR STRIP: 6.5 PH (ref 5–7)
PHOSPHATE SERPL-MCNC: 2.9 MG/DL (ref 2.5–4.5)
PHOSPHATE SERPL-MCNC: 3 MG/DL (ref 2.5–4.5)
PHOSPHATE SERPL-MCNC: 3.3 MG/DL (ref 2.5–4.5)
PHOSPHATE SERPL-MCNC: 3.4 MG/DL (ref 2.5–4.5)
PHOSPHATE SERPL-MCNC: 3.5 MG/DL (ref 2.5–4.5)
PHOSPHATE SERPL-MCNC: 3.7 MG/DL (ref 2.5–4.5)
PHOSPHATE SERPL-MCNC: 3.7 MG/DL (ref 2.5–4.5)
PHOSPHATE SERPL-MCNC: 3.9 MG/DL (ref 2.5–4.5)
PHOSPHATE SERPL-MCNC: 4.2 MG/DL (ref 2.5–4.5)
PHOSPHATE SERPL-MCNC: 4.3 MG/DL (ref 2.5–4.5)
PHOSPHATE SERPL-MCNC: 4.9 MG/DL (ref 2.5–4.5)
PHOSPHATE SERPL-MCNC: 5 MG/DL (ref 2.5–4.5)
PHOSPHATE SERPL-MCNC: 5 MG/DL (ref 2.5–4.5)
PHOSPHATE SERPL-MCNC: 6.1 MG/DL (ref 2.5–4.5)
PHOSPHATE SERPL-MCNC: 6.4 MG/DL (ref 2.5–4.5)
PHOSPHATE SERPL-MCNC: 6.5 MG/DL (ref 2.5–4.5)
PHOSPHATE SERPL-MCNC: 6.6 MG/DL (ref 2.5–4.5)
PHOSPHATE SERPL-MCNC: 6.7 MG/DL (ref 2.5–4.5)
PLATELET # BLD AUTO: 107 10E9/L (ref 150–450)
PLATELET # BLD AUTO: 125 10E9/L (ref 150–450)
PLATELET # BLD AUTO: 126 10E9/L (ref 150–450)
PLATELET # BLD AUTO: 136 10E9/L (ref 150–450)
PLATELET # BLD AUTO: 48 10E9/L (ref 150–450)
PLATELET # BLD AUTO: 49 10E9/L (ref 150–450)
PLATELET # BLD AUTO: 50 10E9/L (ref 150–450)
PLATELET # BLD AUTO: 51 10E9/L (ref 150–450)
PLATELET # BLD AUTO: 55 10E9/L (ref 150–450)
PLATELET # BLD AUTO: 58 10E9/L (ref 150–450)
PLATELET # BLD AUTO: 58 10E9/L (ref 150–450)
PLATELET # BLD AUTO: 60 10E9/L (ref 150–450)
PLATELET # BLD AUTO: 61 10E9/L (ref 150–450)
PLATELET # BLD AUTO: 63 10E9/L (ref 150–450)
PLATELET # BLD AUTO: 66 10E9/L (ref 150–450)
PLATELET # BLD AUTO: 73 10E9/L (ref 150–450)
PLATELET # BLD AUTO: 77 10E9/L (ref 150–450)
PLATELET # BLD AUTO: 77 10E9/L (ref 150–450)
PLATELET # BLD AUTO: 85 10E9/L (ref 150–450)
PLATELET # BLD AUTO: 87 10E9/L (ref 150–450)
PLATELET # BLD AUTO: 88 10E9/L (ref 150–450)
PLATELET # BLD AUTO: 93 10E9/L (ref 150–450)
PLATELET # BLD EST: ABNORMAL 10*3/UL
PLATELET INHIBITION WITH AA: 63 %
PLATELET INHIBITION WITH AA: 78 %
PLATELET INHIBITION WITH AA: 98 %
PLATELET INHIBITION WITH ADP: 55 %
PLATELET INHIBITION WITH ADP: 64 %
PLATELET INHIBITION WITH ADP: 69 %
PO2 BLD: 102 MM HG (ref 80–105)
PO2 BLD: 104 MM HG (ref 80–105)
PO2 BLD: 105 MM HG (ref 80–105)
PO2 BLD: 106 MM HG (ref 80–105)
PO2 BLD: 108 MM HG (ref 80–105)
PO2 BLD: 109 MM HG (ref 80–105)
PO2 BLD: 112 MM HG (ref 80–105)
PO2 BLD: 112 MM HG (ref 80–105)
PO2 BLD: 113 MM HG (ref 80–105)
PO2 BLD: 113 MM HG (ref 80–105)
PO2 BLD: 116 MM HG (ref 80–105)
PO2 BLD: 118 MM HG (ref 80–105)
PO2 BLD: 119 MM HG (ref 80–105)
PO2 BLD: 122 MM HG (ref 80–105)
PO2 BLD: 127 MM HG (ref 80–105)
PO2 BLD: 128 MM HG (ref 80–105)
PO2 BLD: 129 MM HG (ref 80–105)
PO2 BLD: 133 MM HG (ref 80–105)
PO2 BLD: 136 MM HG (ref 80–105)
PO2 BLD: 138 MM HG (ref 80–105)
PO2 BLD: 138 MM HG (ref 80–105)
PO2 BLD: 142 MM HG (ref 80–105)
PO2 BLD: 161 MM HG (ref 80–105)
PO2 BLD: 193 MM HG (ref 80–105)
PO2 BLD: 194 MM HG (ref 80–105)
PO2 BLD: 214 MM HG (ref 80–105)
PO2 BLD: 216 MM HG (ref 80–105)
PO2 BLD: 221 MM HG (ref 80–105)
PO2 BLD: 224 MM HG (ref 80–105)
PO2 BLD: 228 MM HG (ref 80–105)
PO2 BLD: 234 MM HG (ref 80–105)
PO2 BLD: 235 MM HG (ref 80–105)
PO2 BLD: 241 MM HG (ref 80–105)
PO2 BLD: 243 MM HG (ref 80–105)
PO2 BLD: 254 MM HG (ref 80–105)
PO2 BLD: 256 MM HG (ref 80–105)
PO2 BLD: 265 MM HG (ref 80–105)
PO2 BLD: 300 MM HG (ref 80–105)
PO2 BLD: 316 MM HG (ref 80–105)
PO2 BLD: 353 MM HG (ref 80–105)
PO2 BLD: 362 MM HG (ref 80–105)
PO2 BLD: 370 MM HG (ref 80–105)
PO2 BLD: 370 MM HG (ref 80–105)
PO2 BLD: 383 MM HG (ref 80–105)
PO2 BLD: 387 MM HG (ref 80–105)
PO2 BLD: 396 MM HG (ref 80–105)
PO2 BLD: 402 MM HG (ref 80–105)
PO2 BLD: 410 MM HG (ref 80–105)
PO2 BLD: 410 MM HG (ref 80–105)
PO2 BLD: 413 MM HG (ref 80–105)
PO2 BLD: 420 MM HG (ref 80–105)
PO2 BLD: 614 MM HG (ref 80–105)
PO2 BLD: 70 MM HG (ref 80–105)
PO2 BLD: 71 MM HG (ref 80–105)
PO2 BLD: 71 MM HG (ref 80–105)
PO2 BLD: 76 MM HG (ref 80–105)
PO2 BLD: 78 MM HG (ref 80–105)
PO2 BLD: 81 MM HG (ref 80–105)
PO2 BLD: 82 MM HG (ref 80–105)
PO2 BLD: 83 MM HG (ref 80–105)
PO2 BLD: 85 MM HG (ref 80–105)
PO2 BLD: 86 MM HG (ref 80–105)
PO2 BLD: 88 MM HG (ref 80–105)
PO2 BLD: 89 MM HG (ref 80–105)
PO2 BLD: 94 MM HG (ref 80–105)
PO2 BLD: 99 MM HG (ref 80–105)
PO2 BLDA: 103 MM HG (ref 80–105)
PO2 BLDA: 110 MM HG (ref 80–105)
PO2 BLDA: 121 MM HG (ref 80–105)
PO2 BLDA: 148 MM HG (ref 80–105)
PO2 BLDA: 168 MM HG (ref 80–105)
PO2 BLDA: 250 MM HG (ref 80–105)
PO2 BLDA: 266 MM HG (ref 80–105)
PO2 BLDA: 331 MM HG (ref 80–105)
PO2 BLDA: 369 MM HG (ref 80–105)
PO2 BLDA: 400 MM HG (ref 80–105)
PO2 BLDA: 445 MM HG (ref 80–105)
PO2 BLDA: 75 MM HG (ref 80–105)
PO2 BLDA: 80 MM HG (ref 80–105)
PO2 BLDA: 99 MM HG (ref 80–105)
PO2 BLDA: NORMAL MM HG (ref 80–105)
PO2 BLDV: 32 MM HG (ref 25–47)
PO2 BLDV: 34 MM HG (ref 25–47)
PO2 BLDV: 35 MM HG (ref 25–47)
PO2 BLDV: 36 MM HG (ref 25–47)
PO2 BLDV: 37 MM HG (ref 25–47)
PO2 BLDV: 39 MM HG (ref 25–47)
PO2 BLDV: 40 MM HG (ref 25–47)
PO2 BLDV: 42 MM HG (ref 25–47)
PO2 BLDV: 45 MM HG (ref 25–47)
POIKILOCYTOSIS BLD QL SMEAR: SLIGHT
POLYCHROMASIA BLD QL SMEAR: SLIGHT
POTASSIUM BLD-SCNC: 3.5 MMOL/L (ref 3.4–5.3)
POTASSIUM BLD-SCNC: 3.7 MMOL/L (ref 3.4–5.3)
POTASSIUM BLD-SCNC: 3.9 MMOL/L (ref 3.4–5.3)
POTASSIUM BLD-SCNC: 3.9 MMOL/L (ref 3.4–5.3)
POTASSIUM SERPL-SCNC: 3.5 MMOL/L (ref 3.4–5.3)
POTASSIUM SERPL-SCNC: 3.5 MMOL/L (ref 3.4–5.3)
POTASSIUM SERPL-SCNC: 3.7 MMOL/L (ref 3.4–5.3)
POTASSIUM SERPL-SCNC: 3.7 MMOL/L (ref 3.4–5.3)
POTASSIUM SERPL-SCNC: 3.9 MMOL/L (ref 3.4–5.3)
POTASSIUM SERPL-SCNC: 3.9 MMOL/L (ref 3.4–5.3)
POTASSIUM SERPL-SCNC: 4 MMOL/L (ref 3.4–5.3)
POTASSIUM SERPL-SCNC: 4 MMOL/L (ref 3.4–5.3)
POTASSIUM SERPL-SCNC: 4.1 MMOL/L (ref 3.4–5.3)
POTASSIUM SERPL-SCNC: 4.2 MMOL/L (ref 3.4–5.3)
POTASSIUM SERPL-SCNC: 4.2 MMOL/L (ref 3.4–5.3)
POTASSIUM SERPL-SCNC: 4.3 MMOL/L (ref 3.4–5.3)
POTASSIUM SERPL-SCNC: 4.3 MMOL/L (ref 3.4–5.3)
POTASSIUM SERPL-SCNC: 4.5 MMOL/L (ref 3.4–5.3)
POTASSIUM SERPL-SCNC: 4.6 MMOL/L (ref 3.4–5.3)
POTASSIUM SERPL-SCNC: 4.6 MMOL/L (ref 3.4–5.3)
POTASSIUM SERPL-SCNC: 4.8 MMOL/L (ref 3.4–5.3)
POTASSIUM SERPL-SCNC: 4.9 MMOL/L (ref 3.4–5.3)
POTASSIUM SERPL-SCNC: ABNORMAL MMOL/L (ref 3.4–5.3)
PROCALCITONIN SERPL-MCNC: 17.13 NG/ML
PROCALCITONIN SERPL-MCNC: 2.64 NG/ML
PROMYELOCYTES # BLD MANUAL: 0.1 10E9/L
PROMYELOCYTES # BLD MANUAL: 0.2 10E9/L
PROMYELOCYTES NFR BLD MANUAL: 0.9 %
PROMYELOCYTES NFR BLD MANUAL: 1.8 %
PROT SERPL-MCNC: 2.9 G/DL (ref 6.8–8.8)
PROT SERPL-MCNC: 3.6 G/DL (ref 6.8–8.8)
PROT SERPL-MCNC: 3.8 G/DL (ref 6.8–8.8)
PROT SERPL-MCNC: 3.9 G/DL (ref 6.8–8.8)
PROT SERPL-MCNC: 4.2 G/DL (ref 6.8–8.8)
PROT SERPL-MCNC: 4.3 G/DL (ref 6.8–8.8)
PROT SERPL-MCNC: 4.5 G/DL (ref 6.8–8.8)
PROT SERPL-MCNC: 4.6 G/DL (ref 6.8–8.8)
PROT SERPL-MCNC: 4.7 G/DL (ref 6.8–8.8)
PROT SERPL-MCNC: 4.7 G/DL (ref 6.8–8.8)
PROT SERPL-MCNC: 4.8 G/DL (ref 6.8–8.8)
PROT SERPL-MCNC: 4.8 G/DL (ref 6.8–8.8)
PROT SERPL-MCNC: 4.9 G/DL (ref 6.8–8.8)
PROT SERPL-MCNC: 4.9 G/DL (ref 6.8–8.8)
PROT SERPL-MCNC: 5 G/DL (ref 6.8–8.8)
PROT SERPL-MCNC: 5.1 G/DL (ref 6.8–8.8)
R TIME UNTIL CLOT FORMS: 4 MINUTE (ref 5–10)
R TIME UNTIL CLOT FORMS: 4.2 MINUTE (ref 5–10)
R TIME UNTIL CLOT FORMS: 5.1 MINUTE (ref 5–10)
RADIOLOGIST FLAGS: ABNORMAL
RADIOLOGIST FLAGS: ABNORMAL
RBC # BLD AUTO: 2.47 10E12/L (ref 4.4–5.9)
RBC # BLD AUTO: 2.71 10E12/L (ref 4.4–5.9)
RBC # BLD AUTO: 2.72 10E12/L (ref 4.4–5.9)
RBC # BLD AUTO: 2.72 10E12/L (ref 4.4–5.9)
RBC # BLD AUTO: 2.8 10E12/L (ref 4.4–5.9)
RBC # BLD AUTO: 2.8 10E12/L (ref 4.4–5.9)
RBC # BLD AUTO: 2.87 10E12/L (ref 4.4–5.9)
RBC # BLD AUTO: 2.87 10E12/L (ref 4.4–5.9)
RBC # BLD AUTO: 2.89 10E12/L (ref 4.4–5.9)
RBC # BLD AUTO: 2.91 10E12/L (ref 4.4–5.9)
RBC # BLD AUTO: 2.91 10E12/L (ref 4.4–5.9)
RBC # BLD AUTO: 2.92 10E12/L (ref 4.4–5.9)
RBC # BLD AUTO: 2.94 10E12/L (ref 4.4–5.9)
RBC # BLD AUTO: 3 10E12/L (ref 4.4–5.9)
RBC # BLD AUTO: 3.04 10E12/L (ref 4.4–5.9)
RBC # BLD AUTO: 3.05 10E12/L (ref 4.4–5.9)
RBC # BLD AUTO: 3.06 10E12/L (ref 4.4–5.9)
RBC # BLD AUTO: 3.08 10E12/L (ref 4.4–5.9)
RBC # BLD AUTO: 3.12 10E12/L (ref 4.4–5.9)
RBC # BLD AUTO: 3.19 10E12/L (ref 4.4–5.9)
RBC # BLD AUTO: 3.32 10E12/L (ref 4.4–5.9)
RBC # BLD AUTO: 3.43 10E12/L (ref 4.4–5.9)
RBC # BLD AUTO: 3.85 10E12/L (ref 4.4–5.9)
RBC #/AREA URNS AUTO: 11 /HPF (ref 0–2)
RBC INCLUSIONS BLD: SLIGHT
RBC MORPH BLD: NORMAL
SAO2 % BLDA FROM PO2: 100 % (ref 92–100)
SAO2 % BLDA FROM PO2: 94 % (ref 92–100)
SAO2 % BLDA FROM PO2: 95 % (ref 92–100)
SAO2 % BLDV FROM PO2: 53 %
SODIUM BLD-SCNC: 129 MMOL/L (ref 133–144)
SODIUM BLD-SCNC: 133 MMOL/L (ref 133–144)
SODIUM BLD-SCNC: 134 MMOL/L (ref 133–144)
SODIUM BLD-SCNC: 136 MMOL/L (ref 133–144)
SODIUM BLD-SCNC: 144 MMOL/L (ref 133–144)
SODIUM SERPL-SCNC: 129 MMOL/L (ref 133–144)
SODIUM SERPL-SCNC: 140 MMOL/L (ref 133–144)
SODIUM SERPL-SCNC: 142 MMOL/L (ref 133–144)
SODIUM SERPL-SCNC: 143 MMOL/L (ref 133–144)
SODIUM SERPL-SCNC: 144 MMOL/L (ref 133–144)
SODIUM SERPL-SCNC: 145 MMOL/L (ref 133–144)
SODIUM SERPL-SCNC: 145 MMOL/L (ref 133–144)
SODIUM SERPL-SCNC: 146 MMOL/L (ref 133–144)
SODIUM SERPL-SCNC: 148 MMOL/L (ref 133–144)
SODIUM SERPL-SCNC: 149 MMOL/L (ref 133–144)
SODIUM SERPL-SCNC: 150 MMOL/L (ref 133–144)
SODIUM SERPL-SCNC: 150 MMOL/L (ref 133–144)
SODIUM SERPL-SCNC: 152 MMOL/L (ref 133–144)
SODIUM SERPL-SCNC: 152 MMOL/L (ref 133–144)
SODIUM SERPL-SCNC: 154 MMOL/L (ref 133–144)
SODIUM SERPL-SCNC: 154 MMOL/L (ref 133–144)
SODIUM SERPL-SCNC: 156 MMOL/L (ref 133–144)
SOURCE: ABNORMAL
SP GR UR STRIP: 1.03 (ref 1–1.03)
SPECIMEN EXP DATE BLD: NORMAL
SPECIMEN EXP DATE BLD: NORMAL
SPECIMEN SOURCE: NORMAL
SPERM #/AREA URNS HPF: PRESENT /HPF
SQUAMOUS #/AREA URNS AUTO: <1 /HPF (ref 0–1)
TARGETS BLD QL SMEAR: SLIGHT
TRANSFUSION STATUS PATIENT QL: NORMAL
TROPONIN I SERPL-MCNC: 0.81 UG/L (ref 0–0.04)
TROPONIN I SERPL-MCNC: 118.46 UG/L (ref 0–0.04)
TROPONIN I SERPL-MCNC: 145.94 UG/L (ref 0–0.04)
TROPONIN I SERPL-MCNC: 156.68 UG/L (ref 0–0.04)
TROPONIN I SERPL-MCNC: 161.58 UG/L (ref 0–0.04)
TROPONIN I SERPL-MCNC: 178.05 UG/L (ref 0–0.04)
TROPONIN I SERPL-MCNC: 181.51 UG/L (ref 0–0.04)
TROPONIN I SERPL-MCNC: 194.21 UG/L (ref 0–0.04)
TROPONIN I SERPL-MCNC: 52.77 UG/L (ref 0–0.04)
TROPONIN I SERPL-MCNC: 56.87 UG/L (ref 0–0.04)
TROPONIN I SERPL-MCNC: 62.38 UG/L (ref 0–0.04)
TROPONIN I SERPL-MCNC: 63.84 UG/L (ref 0–0.04)
TROPONIN I SERPL-MCNC: 71.95 UG/L (ref 0–0.04)
TROPONIN I SERPL-MCNC: 75.49 UG/L (ref 0–0.04)
TROPONIN I SERPL-MCNC: >200 UG/L (ref 0–0.04)
TROPONIN I SERPL-MCNC: >40 UG/L (ref 0–0.04)
TROPONIN I SERPL-MCNC: >40 UG/L (ref 0–0.04)
UROBILINOGEN UR STRIP-MCNC: NORMAL MG/DL (ref 0–2)
VANCOMYCIN SERPL-MCNC: 15.7 MG/L
WBC # BLD AUTO: 10.7 10E9/L (ref 4–11)
WBC # BLD AUTO: 11.3 10E9/L (ref 4–11)
WBC # BLD AUTO: 11.6 10E9/L (ref 4–11)
WBC # BLD AUTO: 11.7 10E9/L (ref 4–11)
WBC # BLD AUTO: 11.8 10E9/L (ref 4–11)
WBC # BLD AUTO: 11.9 10E9/L (ref 4–11)
WBC # BLD AUTO: 13.3 10E9/L (ref 4–11)
WBC # BLD AUTO: 3.4 10E9/L (ref 4–11)
WBC # BLD AUTO: 3.8 10E9/L (ref 4–11)
WBC # BLD AUTO: 4.1 10E9/L (ref 4–11)
WBC # BLD AUTO: 4.9 10E9/L (ref 4–11)
WBC # BLD AUTO: 6.1 10E9/L (ref 4–11)
WBC # BLD AUTO: 6.1 10E9/L (ref 4–11)
WBC # BLD AUTO: 6.5 10E9/L (ref 4–11)
WBC # BLD AUTO: 6.7 10E9/L (ref 4–11)
WBC # BLD AUTO: 6.8 10E9/L (ref 4–11)
WBC # BLD AUTO: 7.6 10E9/L (ref 4–11)
WBC # BLD AUTO: 8.6 10E9/L (ref 4–11)
WBC # BLD AUTO: 8.6 10E9/L (ref 4–11)
WBC # BLD AUTO: 8.9 10E9/L (ref 4–11)
WBC # BLD AUTO: 9.2 10E9/L (ref 4–11)
WBC # BLD AUTO: 9.7 10E9/L (ref 4–11)
WBC # BLD AUTO: 9.8 10E9/L (ref 4–11)
WBC #/AREA URNS AUTO: 14 /HPF (ref 0–5)

## 2019-01-01 PROCEDURE — 5A1955Z RESPIRATORY VENTILATION, GREATER THAN 96 CONSECUTIVE HOURS: ICD-10-PCS | Performed by: INTERNAL MEDICINE

## 2019-01-01 PROCEDURE — 84460 ALANINE AMINO (ALT) (SGPT): CPT

## 2019-01-01 PROCEDURE — 85520 HEPARIN ASSAY: CPT | Performed by: STUDENT IN AN ORGANIZED HEALTH CARE EDUCATION/TRAINING PROGRAM

## 2019-01-01 PROCEDURE — 84484 ASSAY OF TROPONIN QUANT: CPT | Performed by: STUDENT IN AN ORGANIZED HEALTH CARE EDUCATION/TRAINING PROGRAM

## 2019-01-01 PROCEDURE — 40000076 ZZH STATISTIC IABP MONITORING

## 2019-01-01 PROCEDURE — 82805 BLOOD GASES W/O2 SATURATION: CPT | Performed by: STUDENT IN AN ORGANIZED HEALTH CARE EDUCATION/TRAINING PROGRAM

## 2019-01-01 PROCEDURE — 33949 ECMO/ECLS DAILY MGMT ARTERY: CPT | Mod: GC | Performed by: INTERNAL MEDICINE

## 2019-01-01 PROCEDURE — 83051 HEMOGLOBIN PLASMA: CPT | Performed by: STUDENT IN AN ORGANIZED HEALTH CARE EDUCATION/TRAINING PROGRAM

## 2019-01-01 PROCEDURE — 85652 RBC SED RATE AUTOMATED: CPT | Performed by: STUDENT IN AN ORGANIZED HEALTH CARE EDUCATION/TRAINING PROGRAM

## 2019-01-01 PROCEDURE — 25000128 H RX IP 250 OP 636: Performed by: STUDENT IN AN ORGANIZED HEALTH CARE EDUCATION/TRAINING PROGRAM

## 2019-01-01 PROCEDURE — 85396 CLOTTING ASSAY WHOLE BLOOD: CPT | Performed by: STUDENT IN AN ORGANIZED HEALTH CARE EDUCATION/TRAINING PROGRAM

## 2019-01-01 PROCEDURE — 93454 CORONARY ARTERY ANGIO S&I: CPT

## 2019-01-01 PROCEDURE — 82947 ASSAY GLUCOSE BLOOD QUANT: CPT | Performed by: STUDENT IN AN ORGANIZED HEALTH CARE EDUCATION/TRAINING PROGRAM

## 2019-01-01 PROCEDURE — 25000128 H RX IP 250 OP 636: Performed by: INTERNAL MEDICINE

## 2019-01-01 PROCEDURE — 90947 DIALYSIS REPEATED EVAL: CPT

## 2019-01-01 PROCEDURE — 93010 ELECTROCARDIOGRAM REPORT: CPT | Mod: 59 | Performed by: INTERNAL MEDICINE

## 2019-01-01 PROCEDURE — 85347 COAGULATION TIME ACTIVATED: CPT

## 2019-01-01 PROCEDURE — A9521 TC99M EXAMETAZIME: HCPCS

## 2019-01-01 PROCEDURE — 93325 DOPPLER ECHO COLOR FLOW MAPG: CPT | Mod: 26 | Performed by: INTERNAL MEDICINE

## 2019-01-01 PROCEDURE — 93308 TTE F-UP OR LMTD: CPT

## 2019-01-01 PROCEDURE — 25000125 ZZHC RX 250: Performed by: INTERNAL MEDICINE

## 2019-01-01 PROCEDURE — 00000146 ZZHCL STATISTIC GLUCOSE BY METER IP

## 2019-01-01 PROCEDURE — 85300 ANTITHROMBIN III ACTIVITY: CPT | Performed by: STUDENT IN AN ORGANIZED HEALTH CARE EDUCATION/TRAINING PROGRAM

## 2019-01-01 PROCEDURE — 25000125 ZZHC RX 250: Performed by: PHYSICIAN ASSISTANT

## 2019-01-01 PROCEDURE — 40000344 ZZHCL STATISTIC THAWING COMPONENT: Performed by: STUDENT IN AN ORGANIZED HEALTH CARE EDUCATION/TRAINING PROGRAM

## 2019-01-01 PROCEDURE — 27210794 ZZH OR GENERAL SUPPLY STERILE: Performed by: INTERNAL MEDICINE

## 2019-01-01 PROCEDURE — 85730 THROMBOPLASTIN TIME PARTIAL: CPT | Performed by: STUDENT IN AN ORGANIZED HEALTH CARE EDUCATION/TRAINING PROGRAM

## 2019-01-01 PROCEDURE — 82947 ASSAY GLUCOSE BLOOD QUANT: CPT

## 2019-01-01 PROCEDURE — 80202 ASSAY OF VANCOMYCIN: CPT

## 2019-01-01 PROCEDURE — 93005 ELECTROCARDIOGRAM TRACING: CPT

## 2019-01-01 PROCEDURE — 25000125 ZZHC RX 250: Performed by: STUDENT IN AN ORGANIZED HEALTH CARE EDUCATION/TRAINING PROGRAM

## 2019-01-01 PROCEDURE — 99153 MOD SED SAME PHYS/QHP EA: CPT | Performed by: INTERNAL MEDICINE

## 2019-01-01 PROCEDURE — 82805 BLOOD GASES W/O2 SATURATION: CPT

## 2019-01-01 PROCEDURE — 5A02210 ASSISTANCE WITH CARDIAC OUTPUT USING BALLOON PUMP, CONTINUOUS: ICD-10-PCS | Performed by: INTERNAL MEDICINE

## 2019-01-01 PROCEDURE — 85379 FIBRIN DEGRADATION QUANT: CPT | Performed by: STUDENT IN AN ORGANIZED HEALTH CARE EDUCATION/TRAINING PROGRAM

## 2019-01-01 PROCEDURE — 25800030 ZZH RX IP 258 OP 636: Performed by: INTERNAL MEDICINE

## 2019-01-01 PROCEDURE — 5A1D90Z PERFORMANCE OF URINARY FILTRATION, CONTINUOUS, GREATER THAN 18 HOURS PER DAY: ICD-10-PCS | Performed by: INTERNAL MEDICINE

## 2019-01-01 PROCEDURE — 93880 EXTRACRANIAL BILAT STUDY: CPT

## 2019-01-01 PROCEDURE — 80307 DRUG TEST PRSMV CHEM ANLYZR: CPT | Performed by: STUDENT IN AN ORGANIZED HEALTH CARE EDUCATION/TRAINING PROGRAM

## 2019-01-01 PROCEDURE — 85025 COMPLETE CBC W/AUTO DIFF WBC: CPT | Performed by: STUDENT IN AN ORGANIZED HEALTH CARE EDUCATION/TRAINING PROGRAM

## 2019-01-01 PROCEDURE — 87205 SMEAR GRAM STAIN: CPT | Performed by: STUDENT IN AN ORGANIZED HEALTH CARE EDUCATION/TRAINING PROGRAM

## 2019-01-01 PROCEDURE — C1887 CATHETER, GUIDING: HCPCS | Performed by: INTERNAL MEDICINE

## 2019-01-01 PROCEDURE — C1725 CATH, TRANSLUMIN NON-LASER: HCPCS | Performed by: INTERNAL MEDICINE

## 2019-01-01 PROCEDURE — 94003 VENT MGMT INPAT SUBQ DAY: CPT

## 2019-01-01 PROCEDURE — 82330 ASSAY OF CALCIUM: CPT | Performed by: STUDENT IN AN ORGANIZED HEALTH CARE EDUCATION/TRAINING PROGRAM

## 2019-01-01 PROCEDURE — 85384 FIBRINOGEN ACTIVITY: CPT | Performed by: STUDENT IN AN ORGANIZED HEALTH CARE EDUCATION/TRAINING PROGRAM

## 2019-01-01 PROCEDURE — 80053 COMPREHEN METABOLIC PANEL: CPT | Performed by: STUDENT IN AN ORGANIZED HEALTH CARE EDUCATION/TRAINING PROGRAM

## 2019-01-01 PROCEDURE — 25000132 ZZH RX MED GY IP 250 OP 250 PS 637: Mod: GY | Performed by: STUDENT IN AN ORGANIZED HEALTH CARE EDUCATION/TRAINING PROGRAM

## 2019-01-01 PROCEDURE — 33949 ECMO/ECLS DAILY MGMT ARTERY: CPT

## 2019-01-01 PROCEDURE — 20000004 ZZH R&B ICU UMMC

## 2019-01-01 PROCEDURE — 71045 X-RAY EXAM CHEST 1 VIEW: CPT | Mod: 77

## 2019-01-01 PROCEDURE — P9059 PLASMA, FRZ BETWEEN 8-24HOUR: HCPCS | Performed by: STUDENT IN AN ORGANIZED HEALTH CARE EDUCATION/TRAINING PROGRAM

## 2019-01-01 PROCEDURE — 27210437 ZZH NUTRITION PRODUCT SEMIELEM INTERMED LITER

## 2019-01-01 PROCEDURE — P9041 ALBUMIN (HUMAN),5%, 50ML: HCPCS | Performed by: STUDENT IN AN ORGANIZED HEALTH CARE EDUCATION/TRAINING PROGRAM

## 2019-01-01 PROCEDURE — 83615 LACTATE (LD) (LDH) ENZYME: CPT | Performed by: STUDENT IN AN ORGANIZED HEALTH CARE EDUCATION/TRAINING PROGRAM

## 2019-01-01 PROCEDURE — 83605 ASSAY OF LACTIC ACID: CPT | Performed by: STUDENT IN AN ORGANIZED HEALTH CARE EDUCATION/TRAINING PROGRAM

## 2019-01-01 PROCEDURE — 84100 ASSAY OF PHOSPHORUS: CPT | Performed by: STUDENT IN AN ORGANIZED HEALTH CARE EDUCATION/TRAINING PROGRAM

## 2019-01-01 PROCEDURE — 84132 ASSAY OF SERUM POTASSIUM: CPT

## 2019-01-01 PROCEDURE — 82040 ASSAY OF SERUM ALBUMIN: CPT

## 2019-01-01 PROCEDURE — 25800030 ZZH RX IP 258 OP 636: Performed by: STUDENT IN AN ORGANIZED HEALTH CARE EDUCATION/TRAINING PROGRAM

## 2019-01-01 PROCEDURE — P9041 ALBUMIN (HUMAN),5%, 50ML: HCPCS | Performed by: INTERNAL MEDICINE

## 2019-01-01 PROCEDURE — P9016 RBC LEUKOCYTES REDUCED: HCPCS | Performed by: STUDENT IN AN ORGANIZED HEALTH CARE EDUCATION/TRAINING PROGRAM

## 2019-01-01 PROCEDURE — 40000014 ZZH STATISTIC ARTERIAL MONITORING DAILY

## 2019-01-01 PROCEDURE — C1769 GUIDE WIRE: HCPCS | Performed by: INTERNAL MEDICINE

## 2019-01-01 PROCEDURE — 37799 UNLISTED PX VASCULAR SURGERY: CPT | Performed by: INTERNAL MEDICINE

## 2019-01-01 PROCEDURE — 99233 SBSQ HOSP IP/OBS HIGH 50: CPT | Mod: 25 | Performed by: INTERNAL MEDICINE

## 2019-01-01 PROCEDURE — 83605 ASSAY OF LACTIC ACID: CPT

## 2019-01-01 PROCEDURE — 40000281 ZZH STATISTIC TRANSPORT TIME EA 15 MIN

## 2019-01-01 PROCEDURE — 86316 IMMUNOASSAY TUMOR OTHER: CPT | Performed by: STUDENT IN AN ORGANIZED HEALTH CARE EDUCATION/TRAINING PROGRAM

## 2019-01-01 PROCEDURE — 25800030 ZZH RX IP 258 OP 636

## 2019-01-01 PROCEDURE — 40000275 ZZH STATISTIC RCP TIME EA 10 MIN

## 2019-01-01 PROCEDURE — 86901 BLOOD TYPING SEROLOGIC RH(D): CPT | Performed by: STUDENT IN AN ORGANIZED HEALTH CARE EDUCATION/TRAINING PROGRAM

## 2019-01-01 PROCEDURE — 27210305 ZZH CATH BALLOON IABP

## 2019-01-01 PROCEDURE — 99223 1ST HOSP IP/OBS HIGH 75: CPT | Performed by: CLINICAL NURSE SPECIALIST

## 2019-01-01 PROCEDURE — 82374 ASSAY BLOOD CARBON DIOXIDE: CPT

## 2019-01-01 PROCEDURE — 83735 ASSAY OF MAGNESIUM: CPT | Performed by: STUDENT IN AN ORGANIZED HEALTH CARE EDUCATION/TRAINING PROGRAM

## 2019-01-01 PROCEDURE — 87070 CULTURE OTHR SPECIMN AEROBIC: CPT | Performed by: STUDENT IN AN ORGANIZED HEALTH CARE EDUCATION/TRAINING PROGRAM

## 2019-01-01 PROCEDURE — C1751 CATH, INF, PER/CENT/MIDLINE: HCPCS | Performed by: INTERNAL MEDICINE

## 2019-01-01 PROCEDURE — 93010 ELECTROCARDIOGRAM REPORT: CPT | Performed by: INTERNAL MEDICINE

## 2019-01-01 PROCEDURE — 87040 BLOOD CULTURE FOR BACTERIA: CPT | Performed by: INTERNAL MEDICINE

## 2019-01-01 PROCEDURE — 86140 C-REACTIVE PROTEIN: CPT | Performed by: STUDENT IN AN ORGANIZED HEALTH CARE EDUCATION/TRAINING PROGRAM

## 2019-01-01 PROCEDURE — 36415 COLL VENOUS BLD VENIPUNCTURE: CPT | Performed by: INTERNAL MEDICINE

## 2019-01-01 PROCEDURE — 95951 ZZHC EEG VIDEO EACH 24 HR: CPT | Mod: ZF

## 2019-01-01 PROCEDURE — 85027 COMPLETE CBC AUTOMATED: CPT | Performed by: STUDENT IN AN ORGANIZED HEALTH CARE EDUCATION/TRAINING PROGRAM

## 2019-01-01 PROCEDURE — 85610 PROTHROMBIN TIME: CPT | Performed by: STUDENT IN AN ORGANIZED HEALTH CARE EDUCATION/TRAINING PROGRAM

## 2019-01-01 PROCEDURE — P9037 PLATE PHERES LEUKOREDU IRRAD: HCPCS | Performed by: STUDENT IN AN ORGANIZED HEALTH CARE EDUCATION/TRAINING PROGRAM

## 2019-01-01 PROCEDURE — 82805 BLOOD GASES W/O2 SATURATION: CPT | Performed by: INTERNAL MEDICINE

## 2019-01-01 PROCEDURE — 92928 PRQ TCAT PLMT NTRAC ST 1 LES: CPT | Mod: LM | Performed by: INTERNAL MEDICINE

## 2019-01-01 PROCEDURE — T1013 SIGN LANG/ORAL INTERPRETER: HCPCS | Mod: U3

## 2019-01-01 PROCEDURE — 93925 LOWER EXTREMITY STUDY: CPT

## 2019-01-01 PROCEDURE — 71045 X-RAY EXAM CHEST 1 VIEW: CPT

## 2019-01-01 PROCEDURE — 95951 ZZHC EEG VIDEO < 12 HR: CPT | Mod: 52,ZF

## 2019-01-01 PROCEDURE — 82330 ASSAY OF CALCIUM: CPT

## 2019-01-01 PROCEDURE — 27211184 ZZH CARDIOHELP CIRCUIT

## 2019-01-01 PROCEDURE — 99152 MOD SED SAME PHYS/QHP 5/>YRS: CPT | Performed by: INTERNAL MEDICINE

## 2019-01-01 PROCEDURE — 70450 CT HEAD/BRAIN W/O DYE: CPT

## 2019-01-01 PROCEDURE — 86900 BLOOD TYPING SEROLOGIC ABO: CPT | Performed by: STUDENT IN AN ORGANIZED HEALTH CARE EDUCATION/TRAINING PROGRAM

## 2019-01-01 PROCEDURE — 33967 INSERT I-AORT PERCUT DEVICE: CPT | Performed by: INTERNAL MEDICINE

## 2019-01-01 PROCEDURE — 84155 ASSAY OF PROTEIN SERUM: CPT

## 2019-01-01 PROCEDURE — 94002 VENT MGMT INPAT INIT DAY: CPT

## 2019-01-01 PROCEDURE — 25000128 H RX IP 250 OP 636

## 2019-01-01 PROCEDURE — 93308 TTE F-UP OR LMTD: CPT | Mod: 26 | Performed by: INTERNAL MEDICINE

## 2019-01-01 PROCEDURE — 82565 ASSAY OF CREATININE: CPT

## 2019-01-01 PROCEDURE — 86900 BLOOD TYPING SEROLOGIC ABO: CPT

## 2019-01-01 PROCEDURE — 84295 ASSAY OF SERUM SODIUM: CPT

## 2019-01-01 PROCEDURE — 87086 URINE CULTURE/COLONY COUNT: CPT | Performed by: INTERNAL MEDICINE

## 2019-01-01 PROCEDURE — 93306 TTE W/DOPPLER COMPLETE: CPT | Mod: 26 | Performed by: INTERNAL MEDICINE

## 2019-01-01 PROCEDURE — 80320 DRUG SCREEN QUANTALCOHOLS: CPT | Performed by: STUDENT IN AN ORGANIZED HEALTH CARE EDUCATION/TRAINING PROGRAM

## 2019-01-01 PROCEDURE — C9601 PERC DRUG-EL COR STENT BRAN: HCPCS | Mod: RA | Performed by: INTERNAL MEDICINE

## 2019-01-01 PROCEDURE — 78606 BRAIN IMAGE W/FLOW 4 + VIEWS: CPT

## 2019-01-01 PROCEDURE — 82810 BLOOD GASES O2 SAT ONLY: CPT | Performed by: STUDENT IN AN ORGANIZED HEALTH CARE EDUCATION/TRAINING PROGRAM

## 2019-01-01 PROCEDURE — 40000986 XR ABDOMEN PORT 1 VW

## 2019-01-01 PROCEDURE — 85396 CLOTTING ASSAY WHOLE BLOOD: CPT | Performed by: INTERNAL MEDICINE

## 2019-01-01 PROCEDURE — 25000125 ZZHC RX 250

## 2019-01-01 PROCEDURE — 84295 ASSAY OF SERUM SODIUM: CPT | Performed by: STUDENT IN AN ORGANIZED HEALTH CARE EDUCATION/TRAINING PROGRAM

## 2019-01-01 PROCEDURE — 82435 ASSAY OF BLOOD CHLORIDE: CPT

## 2019-01-01 PROCEDURE — 25800025 ZZH RX 258: Performed by: INTERNAL MEDICINE

## 2019-01-01 PROCEDURE — 92928 PRQ TCAT PLMT NTRAC ST 1 LES: CPT | Mod: LC | Performed by: INTERNAL MEDICINE

## 2019-01-01 PROCEDURE — 33947 ECMO/ECLS INITIATION ARTERY: CPT | Mod: XU | Performed by: INTERNAL MEDICINE

## 2019-01-01 PROCEDURE — G0463 HOSPITAL OUTPT CLINIC VISIT: HCPCS

## 2019-01-01 PROCEDURE — 85610 PROTHROMBIN TIME: CPT | Performed by: INTERNAL MEDICINE

## 2019-01-01 PROCEDURE — 25000132 ZZH RX MED GY IP 250 OP 250 PS 637: Mod: GY | Performed by: INTERNAL MEDICINE

## 2019-01-01 PROCEDURE — 82247 BILIRUBIN TOTAL: CPT

## 2019-01-01 PROCEDURE — 82803 BLOOD GASES ANY COMBINATION: CPT

## 2019-01-01 PROCEDURE — 99291 CRITICAL CARE FIRST HOUR: CPT | Mod: GC | Performed by: INTERNAL MEDICINE

## 2019-01-01 PROCEDURE — 84520 ASSAY OF UREA NITROGEN: CPT

## 2019-01-01 PROCEDURE — B2111ZZ FLUOROSCOPY OF MULTIPLE CORONARY ARTERIES USING LOW OSMOLAR CONTRAST: ICD-10-PCS | Performed by: INTERNAL MEDICINE

## 2019-01-01 PROCEDURE — 83735 ASSAY OF MAGNESIUM: CPT | Performed by: INTERNAL MEDICINE

## 2019-01-01 PROCEDURE — P9016 RBC LEUKOCYTES REDUCED: HCPCS | Performed by: INTERNAL MEDICINE

## 2019-01-01 PROCEDURE — 83605 ASSAY OF LACTIC ACID: CPT | Performed by: INTERNAL MEDICINE

## 2019-01-01 PROCEDURE — 33947 ECMO/ECLS INITIATION ARTERY: CPT

## 2019-01-01 PROCEDURE — 82533 TOTAL CORTISOL: CPT | Performed by: STUDENT IN AN ORGANIZED HEALTH CARE EDUCATION/TRAINING PROGRAM

## 2019-01-01 PROCEDURE — C9113 INJ PANTOPRAZOLE SODIUM, VIA: HCPCS | Performed by: STUDENT IN AN ORGANIZED HEALTH CARE EDUCATION/TRAINING PROGRAM

## 2019-01-01 PROCEDURE — 0DH97UZ INSERTION OF FEEDING DEVICE INTO DUODENUM, VIA NATURAL OR ARTIFICIAL OPENING: ICD-10-PCS | Performed by: INTERNAL MEDICINE

## 2019-01-01 PROCEDURE — 86850 RBC ANTIBODY SCREEN: CPT | Performed by: INTERNAL MEDICINE

## 2019-01-01 PROCEDURE — 33952 ECMO/ECLS INSJ PRPH CANNULA: CPT | Mod: GC | Performed by: INTERNAL MEDICINE

## 2019-01-01 PROCEDURE — 85027 COMPLETE CBC AUTOMATED: CPT | Performed by: INTERNAL MEDICINE

## 2019-01-01 PROCEDURE — 40000986 XR CHEST PORT 1 VW

## 2019-01-01 PROCEDURE — 25000131 ZZH RX MED GY IP 250 OP 636 PS 637: Mod: GY | Performed by: INTERNAL MEDICINE

## 2019-01-01 PROCEDURE — 93454 CORONARY ARTERY ANGIO S&I: CPT | Mod: GC | Performed by: INTERNAL MEDICINE

## 2019-01-01 PROCEDURE — 6A4Z1ZZ HYPOTHERMIA, MULTIPLE: ICD-10-PCS | Performed by: INTERNAL MEDICINE

## 2019-01-01 PROCEDURE — 40000196 ZZH STATISTIC RAPCV CVP MONITORING

## 2019-01-01 PROCEDURE — 84132 ASSAY OF SERUM POTASSIUM: CPT | Performed by: INTERNAL MEDICINE

## 2019-01-01 PROCEDURE — 84145 PROCALCITONIN (PCT): CPT | Performed by: STUDENT IN AN ORGANIZED HEALTH CARE EDUCATION/TRAINING PROGRAM

## 2019-01-01 PROCEDURE — 93454 CORONARY ARTERY ANGIO S&I: CPT | Performed by: INTERNAL MEDICINE

## 2019-01-01 PROCEDURE — 82330 ASSAY OF CALCIUM: CPT | Performed by: INTERNAL MEDICINE

## 2019-01-01 PROCEDURE — P9047 ALBUMIN (HUMAN), 25%, 50ML: HCPCS | Performed by: INTERNAL MEDICINE

## 2019-01-01 PROCEDURE — 85014 HEMATOCRIT: CPT

## 2019-01-01 PROCEDURE — C1874 STENT, COATED/COV W/DEL SYS: HCPCS | Performed by: INTERNAL MEDICINE

## 2019-01-01 PROCEDURE — 86850 RBC ANTIBODY SCREEN: CPT | Performed by: STUDENT IN AN ORGANIZED HEALTH CARE EDUCATION/TRAINING PROGRAM

## 2019-01-01 PROCEDURE — 86901 BLOOD TYPING SEROLOGIC RH(D): CPT | Performed by: INTERNAL MEDICINE

## 2019-01-01 PROCEDURE — 25000555 ZZHC RX FACTOR IP 250 OP 636: Performed by: INTERNAL MEDICINE

## 2019-01-01 PROCEDURE — 40000081 ZZH STATISTIC INSERT IABP

## 2019-01-01 PROCEDURE — 27211402 ZZH SENSOR NIRS OXIMETER, ADULT

## 2019-01-01 PROCEDURE — 87641 MR-STAPH DNA AMP PROBE: CPT

## 2019-01-01 PROCEDURE — 84295 ASSAY OF SERUM SODIUM: CPT | Performed by: INTERNAL MEDICINE

## 2019-01-01 PROCEDURE — 83036 HEMOGLOBIN GLYCOSYLATED A1C: CPT | Performed by: STUDENT IN AN ORGANIZED HEALTH CARE EDUCATION/TRAINING PROGRAM

## 2019-01-01 PROCEDURE — 74176 CT ABD & PELVIS W/O CONTRAST: CPT

## 2019-01-01 PROCEDURE — 86900 BLOOD TYPING SEROLOGIC ABO: CPT | Performed by: INTERNAL MEDICINE

## 2019-01-01 PROCEDURE — 99292 CRITICAL CARE ADDL 30 MIN: CPT | Mod: GC | Performed by: INTERNAL MEDICINE

## 2019-01-01 PROCEDURE — 82310 ASSAY OF CALCIUM: CPT

## 2019-01-01 PROCEDURE — 85730 THROMBOPLASTIN TIME PARTIAL: CPT | Performed by: INTERNAL MEDICINE

## 2019-01-01 PROCEDURE — 85384 FIBRINOGEN ACTIVITY: CPT | Performed by: INTERNAL MEDICINE

## 2019-01-01 PROCEDURE — 84100 ASSAY OF PHOSPHORUS: CPT | Performed by: INTERNAL MEDICINE

## 2019-01-01 PROCEDURE — 30283B1 TRANSFUSION OF NONAUTOLOGOUS 4-FACTOR PROTHROMBIN COMPLEX CONCENTRATE INTO VEIN, PERCUTANEOUS APPROACH: ICD-10-PCS | Performed by: INTERNAL MEDICINE

## 2019-01-01 PROCEDURE — 0273376 DILATION OF CORONARY ARTERY, FOUR OR MORE ARTERIES, BIFURCATION, WITH FOUR OR MORE DRUG-ELUTING INTRALUMINAL DEVICES, PERCUTANEOUS APPROACH: ICD-10-PCS | Performed by: INTERNAL MEDICINE

## 2019-01-01 PROCEDURE — 33952 ECMO/ECLS INSJ PRPH CANNULA: CPT | Mod: XU | Performed by: INTERNAL MEDICINE

## 2019-01-01 PROCEDURE — 25800025 ZZH RX 258: Performed by: STUDENT IN AN ORGANIZED HEALTH CARE EDUCATION/TRAINING PROGRAM

## 2019-01-01 PROCEDURE — C9600 PERC DRUG-EL COR STENT SING: HCPCS | Mod: LM | Performed by: INTERNAL MEDICINE

## 2019-01-01 PROCEDURE — 34300033 ZZH RX 343

## 2019-01-01 PROCEDURE — 44500 INTRO GASTROINTESTINAL TUBE: CPT

## 2019-01-01 PROCEDURE — 84075 ASSAY ALKALINE PHOSPHATASE: CPT

## 2019-01-01 PROCEDURE — 5A1522G EXTRACORPOREAL OXYGENATION, MEMBRANE, PERIPHERAL VENO-ARTERIAL: ICD-10-PCS | Performed by: INTERNAL MEDICINE

## 2019-01-01 PROCEDURE — C9600 PERC DRUG-EL COR STENT SING: HCPCS | Performed by: INTERNAL MEDICINE

## 2019-01-01 PROCEDURE — P9041 ALBUMIN (HUMAN),5%, 50ML: HCPCS

## 2019-01-01 PROCEDURE — 33952 ECMO/ECLS INSJ PRPH CANNULA: CPT | Performed by: INTERNAL MEDICINE

## 2019-01-01 PROCEDURE — C9460 INJECTION, CANGRELOR: HCPCS | Performed by: INTERNAL MEDICINE

## 2019-01-01 PROCEDURE — 86923 COMPATIBILITY TEST ELECTRIC: CPT | Performed by: INTERNAL MEDICINE

## 2019-01-01 PROCEDURE — 93306 TTE W/DOPPLER COMPLETE: CPT

## 2019-01-01 PROCEDURE — 93454 CORONARY ARTERY ANGIO S&I: CPT | Mod: 26 | Performed by: INTERNAL MEDICINE

## 2019-01-01 PROCEDURE — 36620 INSERTION CATHETER ARTERY: CPT | Performed by: INTERNAL MEDICINE

## 2019-01-01 PROCEDURE — 86923 COMPATIBILITY TEST ELECTRIC: CPT | Performed by: STUDENT IN AN ORGANIZED HEALTH CARE EDUCATION/TRAINING PROGRAM

## 2019-01-01 PROCEDURE — P9010 WHOLE BLOOD FOR TRANSFUSION: HCPCS | Performed by: STUDENT IN AN ORGANIZED HEALTH CARE EDUCATION/TRAINING PROGRAM

## 2019-01-01 PROCEDURE — 93321 DOPPLER ECHO F-UP/LMTD STD: CPT | Mod: 26 | Performed by: INTERNAL MEDICINE

## 2019-01-01 PROCEDURE — 82803 BLOOD GASES ANY COMBINATION: CPT | Performed by: STUDENT IN AN ORGANIZED HEALTH CARE EDUCATION/TRAINING PROGRAM

## 2019-01-01 PROCEDURE — 27211119 ZZH ARTERIAL CANNULA 15-17 FRENCH: Performed by: INTERNAL MEDICINE

## 2019-01-01 PROCEDURE — C1894 INTRO/SHEATH, NON-LASER: HCPCS | Performed by: INTERNAL MEDICINE

## 2019-01-01 PROCEDURE — 027034Z DILATION OF CORONARY ARTERY, ONE ARTERY WITH DRUG-ELUTING INTRALUMINAL DEVICE, PERCUTANEOUS APPROACH: ICD-10-PCS | Performed by: INTERNAL MEDICINE

## 2019-01-01 PROCEDURE — 80347 BENZODIAZEPINES 13 OR MORE: CPT | Performed by: STUDENT IN AN ORGANIZED HEALTH CARE EDUCATION/TRAINING PROGRAM

## 2019-01-01 PROCEDURE — 81001 URINALYSIS AUTO W/SCOPE: CPT | Performed by: STUDENT IN AN ORGANIZED HEALTH CARE EDUCATION/TRAINING PROGRAM

## 2019-01-01 PROCEDURE — 87640 STAPH A DNA AMP PROBE: CPT

## 2019-01-01 DEVICE — STENT SYNERGY DRUG ELUTING 3.00X16MM  H7493926016300: Type: IMPLANTABLE DEVICE | Status: FUNCTIONAL

## 2019-01-01 DEVICE — STENT SYNERGY DRUG ELUTING 3.50X20MM  H7493926020350: Type: IMPLANTABLE DEVICE | Status: FUNCTIONAL

## 2019-01-01 DEVICE — STENT SYNERGY DRUG ELUTING 4.00X08MM  H7493926008400: Type: IMPLANTABLE DEVICE | Status: FUNCTIONAL

## 2019-01-01 DEVICE — STENT SYNERGY DRUG ELUTING 2.50X38MM  H7493926038250: Type: IMPLANTABLE DEVICE | Status: FUNCTIONAL

## 2019-01-01 DEVICE — STENT SYNERGY DRUG ELUTING 3.00X24MM  H7493926024300: Type: IMPLANTABLE DEVICE | Status: FUNCTIONAL

## 2019-01-01 RX ORDER — 3% SODIUM CHLORIDE 3 G/100ML
INJECTION, SOLUTION INTRAVENOUS CONTINUOUS
Status: DISCONTINUED | OUTPATIENT
Start: 2019-01-01 | End: 2019-01-01 | Stop reason: HOSPADM

## 2019-01-01 RX ORDER — ALBUMIN, HUMAN INJ 5% 5 %
12.5 SOLUTION INTRAVENOUS ONCE
Status: COMPLETED | OUTPATIENT
Start: 2019-01-01 | End: 2019-01-01

## 2019-01-01 RX ORDER — LIDOCAINE HYDROCHLORIDE 20 MG/ML
5 SOLUTION OROPHARYNGEAL ONCE
Status: COMPLETED | OUTPATIENT
Start: 2019-01-01 | End: 2019-01-01

## 2019-01-01 RX ORDER — DEXTROSE MONOHYDRATE 100 MG/ML
INJECTION, SOLUTION INTRAVENOUS CONTINUOUS
Status: DISCONTINUED | OUTPATIENT
Start: 2019-01-01 | End: 2019-01-01 | Stop reason: HOSPADM

## 2019-01-01 RX ORDER — HEPARIN SODIUM 10000 [USP'U]/100ML
100-1000 INJECTION, SOLUTION INTRAVENOUS CONTINUOUS PRN
Status: DISCONTINUED | OUTPATIENT
Start: 2019-01-01 | End: 2019-01-01 | Stop reason: HOSPADM

## 2019-01-01 RX ORDER — ALBUMIN, HUMAN INJ 5% 5 %
12.5 SOLUTION INTRAVENOUS ONCE
Status: DISCONTINUED | OUTPATIENT
Start: 2019-01-01 | End: 2019-01-01 | Stop reason: HOSPADM

## 2019-01-01 RX ORDER — ALBUMIN, HUMAN INJ 5% 5 %
SOLUTION INTRAVENOUS
Status: DISPENSED
Start: 2019-01-01 | End: 2019-01-01

## 2019-01-01 RX ORDER — AMOXICILLIN 250 MG
1 CAPSULE ORAL AT BEDTIME
Status: DISCONTINUED | OUTPATIENT
Start: 2019-01-01 | End: 2019-01-01 | Stop reason: HOSPADM

## 2019-01-01 RX ORDER — NOREPINEPHRINE BITARTRATE 0.06 MG/ML
INJECTION, SOLUTION INTRAVENOUS CONTINUOUS PRN
Status: COMPLETED | OUTPATIENT
Start: 2019-01-01 | End: 2019-01-01

## 2019-01-01 RX ORDER — ALBUMIN, HUMAN INJ 5% 5 %
25 SOLUTION INTRAVENOUS ONCE
Status: COMPLETED | OUTPATIENT
Start: 2019-01-01 | End: 2019-01-01

## 2019-01-01 RX ORDER — DEXTROSE 20 G/100ML
INJECTION, SOLUTION INTRAVENOUS CONTINUOUS
Status: DISCONTINUED | OUTPATIENT
Start: 2019-01-01 | End: 2019-01-01

## 2019-01-01 RX ORDER — IOPAMIDOL 755 MG/ML
INJECTION, SOLUTION INTRAVASCULAR
Status: DISCONTINUED | OUTPATIENT
Start: 2019-01-01 | End: 2019-01-01 | Stop reason: HOSPADM

## 2019-01-01 RX ORDER — HEPARIN SODIUM 1000 [USP'U]/ML
INJECTION, SOLUTION INTRAVENOUS; SUBCUTANEOUS
Status: DISCONTINUED | OUTPATIENT
Start: 2019-01-01 | End: 2019-01-01 | Stop reason: HOSPADM

## 2019-01-01 RX ORDER — FENTANYL CITRATE 50 UG/ML
50 INJECTION, SOLUTION INTRAMUSCULAR; INTRAVENOUS EVERY 30 MIN PRN
Status: DISCONTINUED | OUTPATIENT
Start: 2019-01-01 | End: 2019-01-01

## 2019-01-01 RX ORDER — MAGNESIUM SULFATE HEPTAHYDRATE 40 MG/ML
4 INJECTION, SOLUTION INTRAVENOUS EVERY 4 HOURS PRN
Status: DISCONTINUED | OUTPATIENT
Start: 2019-01-01 | End: 2019-01-01

## 2019-01-01 RX ORDER — NITROGLYCERIN 5 MG/ML
VIAL (ML) INTRAVENOUS
Status: DISCONTINUED | OUTPATIENT
Start: 2019-01-01 | End: 2019-01-01 | Stop reason: HOSPADM

## 2019-01-01 RX ORDER — HEPARIN SODIUM (PORCINE) LOCK FLUSH IV SOLN 100 UNIT/ML 100 UNIT/ML
5-10 SOLUTION INTRAVENOUS EVERY 30 MIN PRN
Status: DISCONTINUED | OUTPATIENT
Start: 2019-01-01 | End: 2019-01-01 | Stop reason: CLARIF

## 2019-01-01 RX ORDER — ALBUMIN, HUMAN INJ 5% 5 %
250 SOLUTION INTRAVENOUS ONCE
Status: COMPLETED | OUTPATIENT
Start: 2019-01-01 | End: 2019-01-01

## 2019-01-01 RX ORDER — MEPERIDINE HYDROCHLORIDE 25 MG/ML
25-50 INJECTION INTRAMUSCULAR; INTRAVENOUS; SUBCUTANEOUS
Status: ACTIVE | OUTPATIENT
Start: 2019-01-01 | End: 2019-01-01

## 2019-01-01 RX ORDER — POTASSIUM CHLORIDE 29.8 MG/ML
20 INJECTION INTRAVENOUS
Status: DISCONTINUED | OUTPATIENT
Start: 2019-01-01 | End: 2019-01-01

## 2019-01-01 RX ORDER — CALCIUM CHLORIDE 100 MG/ML
INJECTION INTRAVENOUS; INTRAVENTRICULAR
Status: DISCONTINUED | OUTPATIENT
Start: 2019-01-01 | End: 2019-01-01 | Stop reason: HOSPADM

## 2019-01-01 RX ORDER — MIDAZOLAM (PF) 1 MG/ML IN 0.9 % SODIUM CHLORIDE INTRAVENOUS SOLUTION
1-8 CONTINUOUS
Status: DISCONTINUED | OUTPATIENT
Start: 2019-01-01 | End: 2019-01-01 | Stop reason: HOSPADM

## 2019-01-01 RX ORDER — CALCIUM CHLORIDE 100 MG/ML
1 INJECTION INTRAVENOUS; INTRAVENTRICULAR EVERY 10 MIN PRN
Status: DISCONTINUED | OUTPATIENT
Start: 2019-01-01 | End: 2019-01-01

## 2019-01-01 RX ORDER — LIDOCAINE HYDROCHLORIDE ANHYDROUS AND DEXTROSE MONOHYDRATE .8; 5 G/100ML; G/100ML
1-4 INJECTION, SOLUTION INTRAVENOUS CONTINUOUS
Status: DISCONTINUED | OUTPATIENT
Start: 2019-01-01 | End: 2019-01-01 | Stop reason: HOSPADM

## 2019-01-01 RX ORDER — NICOTINE POLACRILEX 4 MG
15-30 LOZENGE BUCCAL
Status: DISCONTINUED | OUTPATIENT
Start: 2019-01-01 | End: 2019-01-01

## 2019-01-01 RX ORDER — PIPERACILLIN SODIUM, TAZOBACTAM SODIUM 3; .375 G/15ML; G/15ML
3.38 INJECTION, POWDER, LYOPHILIZED, FOR SOLUTION INTRAVENOUS EVERY 6 HOURS
Status: DISCONTINUED | OUTPATIENT
Start: 2019-01-01 | End: 2019-01-01

## 2019-01-01 RX ORDER — ALBUMIN (HUMAN) 12.5 G/50ML
SOLUTION INTRAVENOUS CONTINUOUS PRN
Status: COMPLETED | OUTPATIENT
Start: 2019-01-01 | End: 2019-01-01

## 2019-01-01 RX ORDER — ASPIRIN 81 MG/1
TABLET, CHEWABLE ORAL
Status: DISCONTINUED | OUTPATIENT
Start: 2019-01-01 | End: 2019-01-01 | Stop reason: HOSPADM

## 2019-01-01 RX ORDER — ARGATROBAN 1 MG/ML
350 INJECTION, SOLUTION INTRAVENOUS
Status: DISCONTINUED | OUTPATIENT
Start: 2019-01-01 | End: 2019-01-01 | Stop reason: HOSPADM

## 2019-01-01 RX ORDER — HEPARIN SODIUM (PORCINE) LOCK FLUSH IV SOLN 100 UNIT/ML 100 UNIT/ML
5-10 SOLUTION INTRAVENOUS EVERY 30 MIN PRN
Status: DISCONTINUED | OUTPATIENT
Start: 2019-01-01 | End: 2019-01-01

## 2019-01-01 RX ORDER — NALOXONE HYDROCHLORIDE 0.4 MG/ML
.1-.4 INJECTION, SOLUTION INTRAMUSCULAR; INTRAVENOUS; SUBCUTANEOUS
Status: DISCONTINUED | OUTPATIENT
Start: 2019-01-01 | End: 2019-01-01 | Stop reason: HOSPADM

## 2019-01-01 RX ORDER — ALBUMIN, HUMAN INJ 5% 5 %
SOLUTION INTRAVENOUS
Status: COMPLETED
Start: 2019-01-01 | End: 2019-01-01

## 2019-01-01 RX ORDER — DEXTROSE MONOHYDRATE 25 G/50ML
25-50 INJECTION, SOLUTION INTRAVENOUS
Status: DISCONTINUED | OUTPATIENT
Start: 2019-01-01 | End: 2019-01-01

## 2019-01-01 RX ORDER — NOREPINEPHRINE BITARTRATE 0.06 MG/ML
.03-.4 INJECTION, SOLUTION INTRAVENOUS CONTINUOUS PRN
Status: DISCONTINUED | OUTPATIENT
Start: 2019-01-01 | End: 2019-01-01 | Stop reason: HOSPADM

## 2019-01-01 RX ORDER — DEXTROSE MONOHYDRATE 25 G/50ML
25-50 INJECTION, SOLUTION INTRAVENOUS
Status: DISCONTINUED | OUTPATIENT
Start: 2019-01-01 | End: 2019-01-01 | Stop reason: HOSPADM

## 2019-01-01 RX ORDER — MAGNESIUM SULFATE HEPTAHYDRATE 40 MG/ML
4 INJECTION, SOLUTION INTRAVENOUS ONCE
Status: DISCONTINUED | OUTPATIENT
Start: 2019-01-01 | End: 2019-01-01

## 2019-01-01 RX ORDER — MAGNESIUM SULFATE HEPTAHYDRATE 40 MG/ML
4 INJECTION, SOLUTION INTRAVENOUS ONCE
Status: COMPLETED | OUTPATIENT
Start: 2019-01-01 | End: 2019-01-01

## 2019-01-01 RX ORDER — PIPERACILLIN SODIUM, TAZOBACTAM SODIUM 4; .5 G/20ML; G/20ML
4.5 INJECTION, POWDER, LYOPHILIZED, FOR SOLUTION INTRAVENOUS EVERY 6 HOURS
Status: COMPLETED | OUTPATIENT
Start: 2019-01-01 | End: 2019-01-01

## 2019-01-01 RX ORDER — SODIUM NITROPRUSSIDE 25 MG/ML
INJECTION INTRAVENOUS
Status: DISCONTINUED | OUTPATIENT
Start: 2019-01-01 | End: 2019-01-01 | Stop reason: HOSPADM

## 2019-01-01 RX ORDER — ASPIRIN 81 MG/1
81 TABLET, CHEWABLE ORAL DAILY
Status: DISCONTINUED | OUTPATIENT
Start: 2019-01-01 | End: 2019-01-01 | Stop reason: HOSPADM

## 2019-01-01 RX ORDER — MIDAZOLAM (PF) 1 MG/ML IN 0.9 % SODIUM CHLORIDE INTRAVENOUS SOLUTION
1-8 CONTINUOUS
Status: DISCONTINUED | OUTPATIENT
Start: 2019-01-01 | End: 2019-01-01

## 2019-01-01 RX ORDER — OXYMETAZOLINE HYDROCHLORIDE 0.05 G/100ML
2 SPRAY NASAL 2 TIMES DAILY PRN
Status: DISCONTINUED | OUTPATIENT
Start: 2019-01-01 | End: 2019-01-01 | Stop reason: HOSPADM

## 2019-01-01 RX ORDER — ARGATROBAN 1 MG/ML
150 INJECTION, SOLUTION INTRAVENOUS
Status: DISCONTINUED | OUTPATIENT
Start: 2019-01-01 | End: 2019-01-01 | Stop reason: HOSPADM

## 2019-01-01 RX ORDER — LIDOCAINE 40 MG/G
CREAM TOPICAL
Status: DISCONTINUED | OUTPATIENT
Start: 2019-01-01 | End: 2019-01-01 | Stop reason: HOSPADM

## 2019-01-01 RX ORDER — NICOTINE POLACRILEX 4 MG
15-30 LOZENGE BUCCAL
Status: DISCONTINUED | OUTPATIENT
Start: 2019-01-01 | End: 2019-01-01 | Stop reason: HOSPADM

## 2019-01-01 RX ORDER — POTASSIUM CHLORIDE 29.8 MG/ML
20 INJECTION INTRAVENOUS EVERY 6 HOURS PRN
Status: DISCONTINUED | OUTPATIENT
Start: 2019-01-01 | End: 2019-01-01 | Stop reason: HOSPADM

## 2019-01-01 RX ORDER — FENTANYL CITRATE 50 UG/ML
50 INJECTION, SOLUTION INTRAMUSCULAR; INTRAVENOUS EVERY 30 MIN PRN
Status: DISCONTINUED | OUTPATIENT
Start: 2019-01-01 | End: 2019-01-01 | Stop reason: HOSPADM

## 2019-01-01 RX ADMIN — CALCIUM CHLORIDE, MAGNESIUM CHLORIDE, SODIUM CHLORIDE, SODIUM BICARBONATE, POTASSIUM CHLORIDE AND SODIUM PHOSPHATE DIBASIC DIHYDRATE 12.5 ML/KG/HR: 3.68; 3.05; 6.34; 3.09; .314; .187 INJECTION INTRAVENOUS at 22:57

## 2019-01-01 RX ADMIN — CANGRELOR 4 MCG/KG/MIN: 50 INJECTION, POWDER, LYOPHILIZED, FOR SOLUTION INTRAVENOUS at 00:42

## 2019-01-01 RX ADMIN — CALCIUM CHLORIDE, MAGNESIUM CHLORIDE, SODIUM CHLORIDE, SODIUM BICARBONATE, POTASSIUM CHLORIDE AND SODIUM PHOSPHATE DIBASIC DIHYDRATE 12.5 ML/KG/HR: 3.68; 3.05; 6.34; 3.09; .314; .187 INJECTION INTRAVENOUS at 06:23

## 2019-01-01 RX ADMIN — CALCIUM CHLORIDE 1 G: 100 INJECTION, SOLUTION INTRAVENOUS at 10:00

## 2019-01-01 RX ADMIN — ALBUMIN HUMAN 25 G: 0.05 INJECTION, SOLUTION INTRAVENOUS at 17:15

## 2019-01-01 RX ADMIN — Medication 50 MEQ: at 14:38

## 2019-01-01 RX ADMIN — CALCIUM CHLORIDE, MAGNESIUM CHLORIDE, SODIUM CHLORIDE, SODIUM BICARBONATE, POTASSIUM CHLORIDE AND SODIUM PHOSPHATE DIBASIC DIHYDRATE 2.1 ML/KG/HR: 3.68; 3.05; 6.34; 3.09; .314; .187 INJECTION INTRAVENOUS at 13:42

## 2019-01-01 RX ADMIN — ALBUMIN HUMAN 25 G: 0.05 INJECTION, SOLUTION INTRAVENOUS at 07:58

## 2019-01-01 RX ADMIN — MINERAL OIL, PETROLATUM: 425; 573 OINTMENT OPHTHALMIC at 11:32

## 2019-01-01 RX ADMIN — SODIUM CHLORIDE: 3 INJECTION, SOLUTION INTRAVENOUS at 17:34

## 2019-01-01 RX ADMIN — CALCIUM CHLORIDE, MAGNESIUM CHLORIDE, SODIUM CHLORIDE, SODIUM BICARBONATE, POTASSIUM CHLORIDE AND SODIUM PHOSPHATE DIBASIC DIHYDRATE 12.5 ML/KG/HR: 3.68; 3.05; 6.34; 3.09; .314; .187 INJECTION INTRAVENOUS at 00:56

## 2019-01-01 RX ADMIN — MINERAL OIL, PETROLATUM: 425; 573 OINTMENT OPHTHALMIC at 05:07

## 2019-01-01 RX ADMIN — ASPIRIN 81 MG CHEWABLE TABLET 81 MG: 81 TABLET CHEWABLE at 07:51

## 2019-01-01 RX ADMIN — CALCIUM CHLORIDE, MAGNESIUM CHLORIDE, SODIUM CHLORIDE, SODIUM BICARBONATE, POTASSIUM CHLORIDE AND SODIUM PHOSPHATE DIBASIC DIHYDRATE 12.5 ML/KG/HR: 3.68; 3.05; 6.34; 3.09; .314; .187 INJECTION INTRAVENOUS at 06:11

## 2019-01-01 RX ADMIN — CALCIUM CHLORIDE 1 G: 100 INJECTION, SOLUTION INTRAVENOUS at 18:42

## 2019-01-01 RX ADMIN — AMIODARONE HYDROCHLORIDE 1 MG/MIN: 50 INJECTION, SOLUTION INTRAVENOUS at 12:41

## 2019-01-01 RX ADMIN — PIPERACILLIN SODIUM,TAZOBACTAM SODIUM 4.5 G: 4; .5 INJECTION, POWDER, FOR SOLUTION INTRAVENOUS at 03:53

## 2019-01-01 RX ADMIN — LIDOCAINE HYDROCHLORIDE 5 ML: 20 SOLUTION ORAL; TOPICAL at 13:26

## 2019-01-01 RX ADMIN — PANTOPRAZOLE SODIUM 40 MG: 40 INJECTION, POWDER, LYOPHILIZED, FOR SOLUTION INTRAVENOUS at 08:32

## 2019-01-01 RX ADMIN — PANTOPRAZOLE SODIUM 40 MG: 40 INJECTION, POWDER, LYOPHILIZED, FOR SOLUTION INTRAVENOUS at 07:59

## 2019-01-01 RX ADMIN — DEXTROSE 50 % IN WATER (D50W) INTRAVENOUS SYRINGE 25 ML: at 01:28

## 2019-01-01 RX ADMIN — PIPERACILLIN SODIUM,TAZOBACTAM SODIUM 4.5 G: 4; .5 INJECTION, POWDER, FOR SOLUTION INTRAVENOUS at 03:05

## 2019-01-01 RX ADMIN — PIPERACILLIN SODIUM,TAZOBACTAM SODIUM 4.5 G: 4; .5 INJECTION, POWDER, FOR SOLUTION INTRAVENOUS at 16:00

## 2019-01-01 RX ADMIN — TICAGRELOR 180 MG: 90 TABLET ORAL at 07:31

## 2019-01-01 RX ADMIN — CALCIUM CHLORIDE, MAGNESIUM CHLORIDE, SODIUM CHLORIDE, SODIUM BICARBONATE, POTASSIUM CHLORIDE AND SODIUM PHOSPHATE DIBASIC DIHYDRATE 12.5 ML/KG/HR: 3.68; 3.05; 6.34; 3.09; .314; .187 INJECTION INTRAVENOUS at 01:58

## 2019-01-01 RX ADMIN — VANCOMYCIN HYDROCHLORIDE 2000 MG: 10 INJECTION, POWDER, LYOPHILIZED, FOR SOLUTION INTRAVENOUS at 22:27

## 2019-01-01 RX ADMIN — INSULIN ASPART 1 UNITS: 100 INJECTION, SOLUTION INTRAVENOUS; SUBCUTANEOUS at 05:07

## 2019-01-01 RX ADMIN — MINERAL OIL, PETROLATUM: 425; 573 OINTMENT OPHTHALMIC at 04:13

## 2019-01-01 RX ADMIN — Medication 0.19 MCG/KG/MIN: at 00:46

## 2019-01-01 RX ADMIN — TICAGRELOR 90 MG: 90 TABLET ORAL at 07:41

## 2019-01-01 RX ADMIN — VASOPRESSIN 3 UNITS/HR: 20 INJECTION INTRAVENOUS at 02:02

## 2019-01-01 RX ADMIN — CALCIUM CHLORIDE, MAGNESIUM CHLORIDE, SODIUM CHLORIDE, SODIUM BICARBONATE, POTASSIUM CHLORIDE AND SODIUM PHOSPHATE DIBASIC DIHYDRATE 12.5 ML/KG/HR: 3.68; 3.05; 6.34; 3.09; .314; .187 INJECTION INTRAVENOUS at 12:46

## 2019-01-01 RX ADMIN — ASPIRIN 81 MG CHEWABLE TABLET 81 MG: 81 TABLET CHEWABLE at 07:59

## 2019-01-01 RX ADMIN — TICAGRELOR 90 MG: 90 TABLET ORAL at 20:16

## 2019-01-01 RX ADMIN — Medication 0.19 MCG/KG/MIN: at 05:53

## 2019-01-01 RX ADMIN — CALCIUM CHLORIDE, MAGNESIUM CHLORIDE, SODIUM CHLORIDE, SODIUM BICARBONATE, POTASSIUM CHLORIDE AND SODIUM PHOSPHATE DIBASIC DIHYDRATE 2.1 ML/KG/HR: 3.68; 3.05; 6.34; 3.09; .314; .187 INJECTION INTRAVENOUS at 00:00

## 2019-01-01 RX ADMIN — SODIUM CHLORIDE: 3 INJECTION, SOLUTION INTRAVENOUS at 22:56

## 2019-01-01 RX ADMIN — PHENYLEPHRINE HYDROCHLORIDE 1 MCG/KG/MIN: 10 INJECTION INTRAVENOUS at 09:35

## 2019-01-01 RX ADMIN — CALCIUM CHLORIDE, MAGNESIUM CHLORIDE, SODIUM CHLORIDE, SODIUM BICARBONATE, POTASSIUM CHLORIDE AND SODIUM PHOSPHATE DIBASIC DIHYDRATE 2.1 ML/KG/HR: 3.68; 3.05; 6.34; 3.09; .314; .187 INJECTION INTRAVENOUS at 12:46

## 2019-01-01 RX ADMIN — Medication 75 MCG/HR: at 01:11

## 2019-01-01 RX ADMIN — VASOPRESSIN 3 UNITS/HR: 20 INJECTION INTRAVENOUS at 00:32

## 2019-01-01 RX ADMIN — ALBUMIN HUMAN 250 ML: 0.05 INJECTION, SOLUTION INTRAVENOUS at 22:18

## 2019-01-01 RX ADMIN — VANCOMYCIN HYDROCHLORIDE 2000 MG: 10 INJECTION, POWDER, LYOPHILIZED, FOR SOLUTION INTRAVENOUS at 16:27

## 2019-01-01 RX ADMIN — CALCIUM CHLORIDE, MAGNESIUM CHLORIDE, SODIUM CHLORIDE, SODIUM BICARBONATE, POTASSIUM CHLORIDE AND SODIUM PHOSPHATE DIBASIC DIHYDRATE 12.5 ML/KG/HR: 3.68; 3.05; 6.34; 3.09; .314; .187 INJECTION INTRAVENOUS at 17:07

## 2019-01-01 RX ADMIN — CALCIUM CHLORIDE, MAGNESIUM CHLORIDE, SODIUM CHLORIDE, SODIUM BICARBONATE, POTASSIUM CHLORIDE AND SODIUM PHOSPHATE DIBASIC DIHYDRATE 12.5 ML/KG/HR: 3.68; 3.05; 6.34; 3.09; .314; .187 INJECTION INTRAVENOUS at 21:52

## 2019-01-01 RX ADMIN — ASPIRIN 81 MG CHEWABLE TABLET 81 MG: 81 TABLET CHEWABLE at 07:41

## 2019-01-01 RX ADMIN — SODIUM CHLORIDE: 3 INJECTION, SOLUTION INTRAVENOUS at 11:45

## 2019-01-01 RX ADMIN — VANCOMYCIN HYDROCHLORIDE 2000 MG: 10 INJECTION, POWDER, LYOPHILIZED, FOR SOLUTION INTRAVENOUS at 15:16

## 2019-01-01 RX ADMIN — DEXTROSE MONOHYDRATE 4 MCG/KG/MIN: 50 INJECTION, SOLUTION INTRAVENOUS at 02:10

## 2019-01-01 RX ADMIN — ALBUMIN HUMAN 12.5 G: 0.05 INJECTION, SOLUTION INTRAVENOUS at 16:30

## 2019-01-01 RX ADMIN — CALCIUM CHLORIDE 1 G: 100 INJECTION, SOLUTION INTRAVENOUS at 12:26

## 2019-01-01 RX ADMIN — MINERAL OIL, PETROLATUM: 425; 573 OINTMENT OPHTHALMIC at 14:54

## 2019-01-01 RX ADMIN — MAGNESIUM SULFATE HEPTAHYDRATE 4 G: 40 INJECTION, SOLUTION INTRAVENOUS at 07:20

## 2019-01-01 RX ADMIN — PANTOPRAZOLE SODIUM 40 MG: 40 INJECTION, POWDER, LYOPHILIZED, FOR SOLUTION INTRAVENOUS at 07:52

## 2019-01-01 RX ADMIN — PIPERACILLIN SODIUM,TAZOBACTAM SODIUM 4.5 G: 4; .5 INJECTION, POWDER, FOR SOLUTION INTRAVENOUS at 21:26

## 2019-01-01 RX ADMIN — MINERAL OIL, PETROLATUM 3.5 G: 425; 573 OINTMENT OPHTHALMIC at 04:20

## 2019-01-01 RX ADMIN — AMIODARONE HYDROCHLORIDE 150 MG: 1.5 INJECTION, SOLUTION INTRAVENOUS at 07:22

## 2019-01-01 RX ADMIN — SODIUM BICARBONATE 100 MEQ: 84 INJECTION, SOLUTION INTRAVENOUS at 05:09

## 2019-01-01 RX ADMIN — CALCIUM CHLORIDE, MAGNESIUM CHLORIDE, SODIUM CHLORIDE, SODIUM BICARBONATE, POTASSIUM CHLORIDE AND SODIUM PHOSPHATE DIBASIC DIHYDRATE 12.5 ML/KG/HR: 3.68; 3.05; 6.34; 3.09; .314; .187 INJECTION INTRAVENOUS at 03:32

## 2019-01-01 RX ADMIN — CALCIUM CHLORIDE, MAGNESIUM CHLORIDE, SODIUM CHLORIDE, SODIUM BICARBONATE, POTASSIUM CHLORIDE AND SODIUM PHOSPHATE DIBASIC DIHYDRATE 12.5 ML/KG/HR: 3.68; 3.05; 6.34; 3.09; .314; .187 INJECTION INTRAVENOUS at 13:42

## 2019-01-01 RX ADMIN — AMIODARONE HYDROCHLORIDE 1 MG/MIN: 50 INJECTION, SOLUTION INTRAVENOUS at 05:54

## 2019-01-01 RX ADMIN — PIPERACILLIN SODIUM,TAZOBACTAM SODIUM 4.5 G: 4; .5 INJECTION, POWDER, FOR SOLUTION INTRAVENOUS at 21:45

## 2019-01-01 RX ADMIN — CALCIUM CHLORIDE, MAGNESIUM CHLORIDE, SODIUM CHLORIDE, SODIUM BICARBONATE, POTASSIUM CHLORIDE AND SODIUM PHOSPHATE DIBASIC DIHYDRATE 12.5 ML/KG/HR: 3.68; 3.05; 6.34; 3.09; .314; .187 INJECTION INTRAVENOUS at 20:59

## 2019-01-01 RX ADMIN — PANTOPRAZOLE SODIUM 40 MG: 40 INJECTION, POWDER, LYOPHILIZED, FOR SOLUTION INTRAVENOUS at 07:31

## 2019-01-01 RX ADMIN — LIDOCAINE HYDROCHLORIDE 100 MG: 20 INJECTION INTRAVENOUS at 07:23

## 2019-01-01 RX ADMIN — EXAMETAZIME 24 MILLICURIE: KIT at 09:17

## 2019-01-01 RX ADMIN — SODIUM CHLORIDE: 3 INJECTION, SOLUTION INTRAVENOUS at 12:47

## 2019-01-01 RX ADMIN — PIPERACILLIN SODIUM AND TAZOBACTAM SODIUM 3.38 G: 3; .375 INJECTION, POWDER, LYOPHILIZED, FOR SOLUTION INTRAVENOUS at 03:40

## 2019-01-01 RX ADMIN — SODIUM BICARBONATE 50 MEQ: 84 INJECTION, SOLUTION INTRAVENOUS at 12:40

## 2019-01-01 RX ADMIN — CALCIUM CHLORIDE 1 G: 100 INJECTION INTRAVENOUS; INTRAVENTRICULAR at 06:06

## 2019-01-01 RX ADMIN — Medication 0.09 MCG/KG/MIN: at 20:22

## 2019-01-01 RX ADMIN — CALCIUM CHLORIDE, MAGNESIUM CHLORIDE, SODIUM CHLORIDE, SODIUM BICARBONATE, POTASSIUM CHLORIDE AND SODIUM PHOSPHATE DIBASIC DIHYDRATE 12.5 ML/KG/HR: 3.68; 3.05; 6.34; 3.09; .314; .187 INJECTION INTRAVENOUS at 18:08

## 2019-01-01 RX ADMIN — VASOPRESSIN 3 UNITS/HR: 20 INJECTION INTRAVENOUS at 05:20

## 2019-01-01 RX ADMIN — AMIODARONE HYDROCHLORIDE 150 MG: 1.5 INJECTION, SOLUTION INTRAVENOUS at 11:55

## 2019-01-01 RX ADMIN — ALBUMIN HUMAN 12.5 G: 0.05 INJECTION, SOLUTION INTRAVENOUS at 15:30

## 2019-01-01 RX ADMIN — MINERAL OIL, PETROLATUM: 425; 573 OINTMENT OPHTHALMIC at 20:26

## 2019-01-01 RX ADMIN — Medication 8 MG/HR: at 14:27

## 2019-01-01 RX ADMIN — DEXTROSE MONOHYDRATE: 100 INJECTION, SOLUTION INTRAVENOUS at 04:12

## 2019-01-01 RX ADMIN — MINERAL OIL, PETROLATUM: 425; 573 OINTMENT OPHTHALMIC at 05:57

## 2019-01-01 RX ADMIN — PANTOPRAZOLE SODIUM 40 MG: 40 INJECTION, POWDER, LYOPHILIZED, FOR SOLUTION INTRAVENOUS at 08:10

## 2019-01-01 RX ADMIN — HEPARIN SODIUM 6.64 UNITS/KG/HR: 10000 INJECTION, SOLUTION INTRAVENOUS at 07:18

## 2019-01-01 RX ADMIN — SODIUM CHLORIDE: 3 INJECTION, SOLUTION INTRAVENOUS at 06:07

## 2019-01-01 RX ADMIN — SODIUM BICARBONATE 50 MEQ: 84 INJECTION, SOLUTION INTRAVENOUS at 14:38

## 2019-01-01 RX ADMIN — PIPERACILLIN SODIUM,TAZOBACTAM SODIUM 4.5 G: 4; .5 INJECTION, POWDER, FOR SOLUTION INTRAVENOUS at 15:00

## 2019-01-01 RX ADMIN — SENNOSIDES AND DOCUSATE SODIUM 1 TABLET: 8.6; 5 TABLET ORAL at 21:26

## 2019-01-01 RX ADMIN — INSULIN ASPART 1 UNITS: 100 INJECTION, SOLUTION INTRAVENOUS; SUBCUTANEOUS at 12:03

## 2019-01-01 RX ADMIN — PIPERACILLIN SODIUM AND TAZOBACTAM SODIUM 3.38 G: 3; .375 INJECTION, POWDER, LYOPHILIZED, FOR SOLUTION INTRAVENOUS at 15:52

## 2019-01-01 RX ADMIN — MINERAL OIL, PETROLATUM: 425; 573 OINTMENT OPHTHALMIC at 03:44

## 2019-01-01 RX ADMIN — Medication 75 MCG/HR: at 03:01

## 2019-01-01 RX ADMIN — Medication 8 MG/HR: at 01:11

## 2019-01-01 RX ADMIN — PIPERACILLIN SODIUM,TAZOBACTAM SODIUM 4.5 G: 4; .5 INJECTION, POWDER, FOR SOLUTION INTRAVENOUS at 08:57

## 2019-01-01 RX ADMIN — SODIUM BICARBONATE 50 MEQ: 84 INJECTION, SOLUTION INTRAVENOUS at 08:52

## 2019-01-01 RX ADMIN — CALCIUM CHLORIDE, MAGNESIUM CHLORIDE, SODIUM CHLORIDE, SODIUM BICARBONATE, POTASSIUM CHLORIDE AND SODIUM PHOSPHATE DIBASIC DIHYDRATE 12.5 ML/KG/HR: 3.68; 3.05; 6.34; 3.09; .314; .187 INJECTION INTRAVENOUS at 09:37

## 2019-01-01 RX ADMIN — SODIUM CHLORIDE: 3 INJECTION, SOLUTION INTRAVENOUS at 03:05

## 2019-01-01 RX ADMIN — CALCIUM CHLORIDE, MAGNESIUM CHLORIDE, SODIUM CHLORIDE, SODIUM BICARBONATE, POTASSIUM CHLORIDE AND SODIUM PHOSPHATE DIBASIC DIHYDRATE 2.1 ML/KG/HR: 3.68; 3.05; 6.34; 3.09; .314; .187 INJECTION INTRAVENOUS at 10:20

## 2019-01-01 RX ADMIN — CALCIUM CHLORIDE, MAGNESIUM CHLORIDE, SODIUM CHLORIDE, SODIUM BICARBONATE, POTASSIUM CHLORIDE AND SODIUM PHOSPHATE DIBASIC DIHYDRATE 12.5 ML/KG/HR: 3.68; 3.05; 6.34; 3.09; .314; .187 INJECTION INTRAVENOUS at 10:20

## 2019-01-01 RX ADMIN — HUMAN INSULIN 2 UNITS/HR: 100 INJECTION, SOLUTION SUBCUTANEOUS at 02:02

## 2019-01-01 RX ADMIN — Medication 5 MG/HR: at 00:46

## 2019-01-01 RX ADMIN — CALCIUM CHLORIDE, MAGNESIUM CHLORIDE, SODIUM CHLORIDE, SODIUM BICARBONATE, POTASSIUM CHLORIDE AND SODIUM PHOSPHATE DIBASIC DIHYDRATE 12.5 ML/KG/HR: 3.68; 3.05; 6.34; 3.09; .314; .187 INJECTION INTRAVENOUS at 08:57

## 2019-01-01 RX ADMIN — Medication 0.2 MCG/KG/MIN: at 17:51

## 2019-01-01 RX ADMIN — CALCIUM CHLORIDE, MAGNESIUM CHLORIDE, SODIUM CHLORIDE, SODIUM BICARBONATE, POTASSIUM CHLORIDE AND SODIUM PHOSPHATE DIBASIC DIHYDRATE 12.5 ML/KG/HR: 3.68; 3.05; 6.34; 3.09; .314; .187 INJECTION INTRAVENOUS at 14:51

## 2019-01-01 RX ADMIN — CALCIUM CHLORIDE, MAGNESIUM CHLORIDE, SODIUM CHLORIDE, SODIUM BICARBONATE, POTASSIUM CHLORIDE AND SODIUM PHOSPHATE DIBASIC DIHYDRATE 12.5 ML/KG/HR: 3.68; 3.05; 6.34; 3.09; .314; .187 INJECTION INTRAVENOUS at 03:57

## 2019-01-01 RX ADMIN — TICAGRELOR 90 MG: 90 TABLET ORAL at 19:46

## 2019-01-01 RX ADMIN — PIPERACILLIN SODIUM,TAZOBACTAM SODIUM 4.5 G: 4; .5 INJECTION, POWDER, FOR SOLUTION INTRAVENOUS at 11:31

## 2019-01-01 RX ADMIN — PIPERACILLIN SODIUM,TAZOBACTAM SODIUM 4.5 G: 4; .5 INJECTION, POWDER, FOR SOLUTION INTRAVENOUS at 09:37

## 2019-01-01 RX ADMIN — SENNOSIDES AND DOCUSATE SODIUM 1 TABLET: 8.6; 5 TABLET ORAL at 21:08

## 2019-01-01 RX ADMIN — MINERAL OIL, PETROLATUM: 425; 573 OINTMENT OPHTHALMIC at 04:08

## 2019-01-01 RX ADMIN — CALCIUM CHLORIDE 1 G: 100 INJECTION INTRAVENOUS; INTRAVENTRICULAR at 06:05

## 2019-01-01 RX ADMIN — CALCIUM CHLORIDE, MAGNESIUM CHLORIDE, SODIUM CHLORIDE, SODIUM BICARBONATE, POTASSIUM CHLORIDE AND SODIUM PHOSPHATE DIBASIC DIHYDRATE 2.1 ML/KG/HR: 3.68; 3.05; 6.34; 3.09; .314; .187 INJECTION INTRAVENOUS at 06:23

## 2019-01-01 RX ADMIN — SODIUM CHLORIDE: 3 INJECTION, SOLUTION INTRAVENOUS at 07:12

## 2019-01-01 RX ADMIN — ALBUMIN HUMAN 25 G: 50 SOLUTION INTRAVENOUS at 17:15

## 2019-01-01 RX ADMIN — INSULIN ASPART 1 UNITS: 100 INJECTION, SOLUTION INTRAVENOUS; SUBCUTANEOUS at 00:31

## 2019-01-01 RX ADMIN — CALCIUM CHLORIDE, MAGNESIUM CHLORIDE, SODIUM CHLORIDE, SODIUM BICARBONATE, POTASSIUM CHLORIDE AND SODIUM PHOSPHATE DIBASIC DIHYDRATE 12.5 ML/KG/HR: 3.68; 3.05; 6.34; 3.09; .314; .187 INJECTION INTRAVENOUS at 23:59

## 2019-01-01 RX ADMIN — INSULIN ASPART 1 UNITS: 100 INJECTION, SOLUTION INTRAVENOUS; SUBCUTANEOUS at 20:16

## 2019-01-01 RX ADMIN — CALCIUM CHLORIDE, MAGNESIUM CHLORIDE, SODIUM CHLORIDE, SODIUM BICARBONATE, POTASSIUM CHLORIDE AND SODIUM PHOSPHATE DIBASIC DIHYDRATE 12.5 ML/KG/HR: 3.68; 3.05; 6.34; 3.09; .314; .187 INJECTION INTRAVENOUS at 22:58

## 2019-01-01 RX ADMIN — PIPERACILLIN SODIUM AND TAZOBACTAM SODIUM 3.38 G: 3; .375 INJECTION, POWDER, LYOPHILIZED, FOR SOLUTION INTRAVENOUS at 21:21

## 2019-01-01 RX ADMIN — Medication 0.06 MCG/KG/MIN: at 04:34

## 2019-01-01 RX ADMIN — ALBUMIN HUMAN 12.5 G: 0.05 INJECTION, SOLUTION INTRAVENOUS at 09:00

## 2019-01-01 RX ADMIN — TICAGRELOR 90 MG: 90 TABLET ORAL at 08:10

## 2019-01-01 RX ADMIN — OXYMETAZOLINE HYDROCHLORIDE 2 SPRAY: 0.05 SPRAY NASAL at 17:07

## 2019-01-01 RX ADMIN — Medication 2 G: at 10:54

## 2019-01-01 RX ADMIN — ASPIRIN 81 MG CHEWABLE TABLET 81 MG: 81 TABLET CHEWABLE at 08:32

## 2019-01-01 RX ADMIN — ASPIRIN 81 MG CHEWABLE TABLET 81 MG: 81 TABLET CHEWABLE at 08:10

## 2019-01-01 RX ADMIN — AMIODARONE HYDROCHLORIDE 1 MG/MIN: 50 INJECTION, SOLUTION INTRAVENOUS at 13:37

## 2019-01-01 RX ADMIN — CALCIUM CHLORIDE, MAGNESIUM CHLORIDE, SODIUM CHLORIDE, SODIUM BICARBONATE, POTASSIUM CHLORIDE AND SODIUM PHOSPHATE DIBASIC DIHYDRATE 12.5 ML/KG/HR: 3.68; 3.05; 6.34; 3.09; .314; .187 INJECTION INTRAVENOUS at 21:51

## 2019-01-01 RX ADMIN — AMIODARONE HYDROCHLORIDE 150 MG: 1.5 INJECTION, SOLUTION INTRAVENOUS at 06:30

## 2019-01-01 RX ADMIN — PIPERACILLIN SODIUM,TAZOBACTAM SODIUM 4.5 G: 4; .5 INJECTION, POWDER, FOR SOLUTION INTRAVENOUS at 12:00

## 2019-01-01 RX ADMIN — MINERAL OIL, PETROLATUM: 425; 573 OINTMENT OPHTHALMIC at 12:57

## 2019-01-01 RX ADMIN — PIPERACILLIN SODIUM,TAZOBACTAM SODIUM 4.5 G: 4; .5 INJECTION, POWDER, FOR SOLUTION INTRAVENOUS at 14:59

## 2019-01-01 RX ADMIN — PIPERACILLIN SODIUM,TAZOBACTAM SODIUM 4.5 G: 4; .5 INJECTION, POWDER, FOR SOLUTION INTRAVENOUS at 21:08

## 2019-01-01 RX ADMIN — CALCIUM CHLORIDE 1 G: 100 INJECTION, SOLUTION INTRAVENOUS at 06:28

## 2019-01-01 RX ADMIN — MINERAL OIL, PETROLATUM: 425; 573 OINTMENT OPHTHALMIC at 13:28

## 2019-01-01 RX ADMIN — VASOPRESSIN 2 UNITS/HR: 20 INJECTION INTRAVENOUS at 17:12

## 2019-01-01 RX ADMIN — CALCIUM CHLORIDE, MAGNESIUM CHLORIDE, SODIUM CHLORIDE, SODIUM BICARBONATE, POTASSIUM CHLORIDE AND SODIUM PHOSPHATE DIBASIC DIHYDRATE 12.5 ML/KG/HR: 3.68; 3.05; 6.34; 3.09; .314; .187 INJECTION INTRAVENOUS at 05:17

## 2019-01-01 RX ADMIN — ALBUMIN HUMAN 12.5 G: 0.05 INJECTION, SOLUTION INTRAVENOUS at 10:02

## 2019-01-01 RX ADMIN — TICAGRELOR 90 MG: 90 TABLET ORAL at 08:32

## 2019-01-01 RX ADMIN — PIPERACILLIN SODIUM,TAZOBACTAM SODIUM 4.5 G: 4; .5 INJECTION, POWDER, FOR SOLUTION INTRAVENOUS at 14:43

## 2019-01-01 RX ADMIN — CALCIUM CHLORIDE 1 G: 100 INJECTION, SOLUTION INTRAVENOUS at 17:33

## 2019-01-01 RX ADMIN — VASOPRESSIN 2 UNITS/HR: 20 INJECTION INTRAVENOUS at 04:41

## 2019-01-01 RX ADMIN — Medication 6 MG/HR: at 12:44

## 2019-01-01 RX ADMIN — Medication 2 MG/HR: at 21:09

## 2019-01-01 RX ADMIN — MINERAL OIL, PETROLATUM: 425; 573 OINTMENT OPHTHALMIC at 20:16

## 2019-01-01 RX ADMIN — Medication 6 MG/HR: at 04:36

## 2019-01-01 RX ADMIN — TICAGRELOR 90 MG: 90 TABLET ORAL at 20:22

## 2019-01-01 RX ADMIN — VASOPRESSIN 1 UNITS/HR: 20 INJECTION INTRAVENOUS at 14:01

## 2019-01-01 RX ADMIN — Medication 100 MEQ: at 05:09

## 2019-01-01 RX ADMIN — ALBUMIN HUMAN 12.5 G: 0.05 INJECTION, SOLUTION INTRAVENOUS at 16:33

## 2019-01-01 RX ADMIN — CALCIUM CHLORIDE, MAGNESIUM CHLORIDE, SODIUM CHLORIDE, SODIUM BICARBONATE, POTASSIUM CHLORIDE AND SODIUM PHOSPHATE DIBASIC DIHYDRATE 12.5 ML/KG/HR: 3.68; 3.05; 6.34; 3.09; .314; .187 INJECTION INTRAVENOUS at 23:29

## 2019-01-01 RX ADMIN — HEPARIN SODIUM 700 UNITS/HR: 10000 INJECTION, SOLUTION INTRAVENOUS at 13:27

## 2019-01-01 RX ADMIN — MINERAL OIL, PETROLATUM 3.5 G: 425; 573 OINTMENT OPHTHALMIC at 20:03

## 2019-01-01 RX ADMIN — FENTANYL CITRATE 50 MCG/HR: 50 INJECTION INTRAVENOUS at 05:07

## 2019-01-01 RX ADMIN — CALCIUM GLUCONATE 2 G: 98 INJECTION, SOLUTION INTRAVENOUS at 06:01

## 2019-01-01 RX ADMIN — DEXTROSE MONOHYDRATE 4 MCG/KG/MIN: 50 INJECTION, SOLUTION INTRAVENOUS at 04:35

## 2019-01-01 RX ADMIN — TICAGRELOR 90 MG: 90 TABLET ORAL at 20:03

## 2019-01-01 RX ADMIN — PANTOPRAZOLE SODIUM 40 MG: 40 INJECTION, POWDER, LYOPHILIZED, FOR SOLUTION INTRAVENOUS at 07:41

## 2019-01-01 RX ADMIN — PIPERACILLIN SODIUM,TAZOBACTAM SODIUM 4.5 G: 4; .5 INJECTION, POWDER, FOR SOLUTION INTRAVENOUS at 21:11

## 2019-01-01 RX ADMIN — AMIODARONE HYDROCHLORIDE 0.5 MG/MIN: 50 INJECTION, SOLUTION INTRAVENOUS at 12:00

## 2019-01-01 RX ADMIN — TICAGRELOR 90 MG: 90 TABLET ORAL at 20:27

## 2019-01-01 RX ADMIN — ASPIRIN 81 MG CHEWABLE TABLET 81 MG: 81 TABLET CHEWABLE at 07:31

## 2019-01-01 RX ADMIN — PIPERACILLIN SODIUM AND TAZOBACTAM SODIUM 3.38 G: 3; .375 INJECTION, POWDER, LYOPHILIZED, FOR SOLUTION INTRAVENOUS at 08:24

## 2019-01-01 RX ADMIN — CALCIUM CHLORIDE, MAGNESIUM CHLORIDE, SODIUM CHLORIDE, SODIUM BICARBONATE, POTASSIUM CHLORIDE AND SODIUM PHOSPHATE DIBASIC DIHYDRATE 12.5 ML/KG/HR: 3.68; 3.05; 6.34; 3.09; .314; .187 INJECTION INTRAVENOUS at 00:02

## 2019-01-01 RX ADMIN — CALCIUM CHLORIDE, MAGNESIUM CHLORIDE, SODIUM CHLORIDE, SODIUM BICARBONATE, POTASSIUM CHLORIDE AND SODIUM PHOSPHATE DIBASIC DIHYDRATE 12.5 ML/KG/HR: 3.68; 3.05; 6.34; 3.09; .314; .187 INJECTION INTRAVENOUS at 00:57

## 2019-01-01 RX ADMIN — VASOPRESSIN 3 UNITS/HR: 20 INJECTION INTRAVENOUS at 12:28

## 2019-01-01 RX ADMIN — PIPERACILLIN SODIUM,TAZOBACTAM SODIUM 4.5 G: 4; .5 INJECTION, POWDER, FOR SOLUTION INTRAVENOUS at 03:17

## 2019-01-01 RX ADMIN — CALCIUM CHLORIDE, MAGNESIUM CHLORIDE, SODIUM CHLORIDE, SODIUM BICARBONATE, POTASSIUM CHLORIDE AND SODIUM PHOSPHATE DIBASIC DIHYDRATE 12.5 ML/KG/HR: 3.68; 3.05; 6.34; 3.09; .314; .187 INJECTION INTRAVENOUS at 05:16

## 2019-01-01 RX ADMIN — TICAGRELOR 90 MG: 90 TABLET ORAL at 07:59

## 2019-01-01 RX ADMIN — VASOPRESSIN 3 UNITS/HR: 20 INJECTION INTRAVENOUS at 13:38

## 2019-01-01 RX ADMIN — HUMAN INSULIN 2 UNITS/HR: 100 INJECTION, SOLUTION SUBCUTANEOUS at 18:01

## 2019-01-01 RX ADMIN — HUMAN INSULIN 1 UNITS/HR: 100 INJECTION, SOLUTION SUBCUTANEOUS at 10:50

## 2019-01-01 RX ADMIN — COAGULATION FACTOR VIIA (RECOMBINANT) 4 MG: KIT at 03:05

## 2019-01-01 RX ADMIN — SODIUM CHLORIDE: 3 INJECTION, SOLUTION INTRAVENOUS at 10:55

## 2019-01-01 RX ADMIN — MINERAL OIL, PETROLATUM: 425; 573 OINTMENT OPHTHALMIC at 19:47

## 2019-01-01 RX ADMIN — TICAGRELOR 90 MG: 90 TABLET ORAL at 07:52

## 2019-01-01 RX ADMIN — VANCOMYCIN HYDROCHLORIDE 2000 MG: 10 INJECTION, POWDER, LYOPHILIZED, FOR SOLUTION INTRAVENOUS at 21:45

## 2019-01-01 RX ADMIN — SENNOSIDES AND DOCUSATE SODIUM 1 TABLET: 8.6; 5 TABLET ORAL at 22:03

## 2019-01-01 RX ADMIN — PIPERACILLIN SODIUM,TAZOBACTAM SODIUM 4.5 G: 4; .5 INJECTION, POWDER, FOR SOLUTION INTRAVENOUS at 04:01

## 2019-01-01 RX ADMIN — HEPARIN SODIUM 3 ML/HR: 1000 INJECTION, SOLUTION INTRAVENOUS; SUBCUTANEOUS at 06:10

## 2019-01-01 RX ADMIN — Medication 50 MCG/HR: at 12:02

## 2019-01-01 RX ADMIN — VANCOMYCIN HYDROCHLORIDE 2000 MG: 10 INJECTION, POWDER, LYOPHILIZED, FOR SOLUTION INTRAVENOUS at 21:51

## 2019-01-01 RX ADMIN — MINERAL OIL, PETROLATUM: 425; 573 OINTMENT OPHTHALMIC at 12:26

## 2019-01-01 RX ADMIN — SODIUM CHLORIDE: 3 INJECTION, SOLUTION INTRAVENOUS at 21:51

## 2019-01-01 RX ADMIN — Medication 2 G: at 18:24

## 2019-01-01 RX ADMIN — CALCIUM CHLORIDE 1 G: 100 INJECTION, SOLUTION INTRAVENOUS at 18:36

## 2019-01-01 ASSESSMENT — ACTIVITIES OF DAILY LIVING (ADL)
ADLS_ACUITY_SCORE: 26
ADLS_ACUITY_SCORE: 22
ADLS_ACUITY_SCORE: 26
ADLS_ACUITY_SCORE: 22
ADLS_ACUITY_SCORE: 22
ADLS_ACUITY_SCORE: 26
ADLS_ACUITY_SCORE: 22
ADLS_ACUITY_SCORE: 26
ADLS_ACUITY_SCORE: 22
ADLS_ACUITY_SCORE: 26
ADLS_ACUITY_SCORE: 22
ADLS_ACUITY_SCORE: 26
ADLS_ACUITY_SCORE: 22
ADLS_ACUITY_SCORE: 22
ADLS_ACUITY_SCORE: 26
ADLS_ACUITY_SCORE: 22

## 2019-01-01 ASSESSMENT — MIFFLIN-ST. JEOR
SCORE: 1747.13
SCORE: 1759.13
SCORE: 1772.13
SCORE: 1747.13
SCORE: 1768.13
SCORE: 1757.13

## 2019-07-31 NOTE — Clinical Note
The first balloon was inserted into the left anterior descending and ostium left anterior descending.Max pressure = 18 thao. Total duration = 10 seconds.

## 2019-07-31 NOTE — Clinical Note
Stent deployed in the middle right coronary artery. Max pressure = 14 thao. Total duration = 8 seconds.

## 2019-07-31 NOTE — Clinical Note
The first balloon was inserted into the left anterior descending and ostium left anterior descending.Max pressure = 12 thao. Total duration = 5 seconds.

## 2019-07-31 NOTE — Clinical Note
The first balloon was inserted into the circumflex and proximal circumflex.Max pressure = 12 thao. Total duration = 6 seconds.

## 2019-07-31 NOTE — Clinical Note
Stent deployed in the posterior descending artery. Max pressure = 12 thao. Total duration = 6 seconds.

## 2019-07-31 NOTE — Clinical Note
The first balloon was inserted into the left anterior descending and ostium left anterior descending.Max pressure = 5 thao. Total duration = 5 seconds.

## 2019-07-31 NOTE — Clinical Note
The first balloon was inserted into the circumflex and ostium circumflex.Max pressure = 16 thao. Total duration = 5 seconds.     Max pressure = 16 thao. Total duration = 6 seconds.    Balloon reinflated a second time: Max pressure = 16 thao. Total duration = 6 seconds.

## 2019-07-31 NOTE — Clinical Note
The first balloon was inserted into the left anterior descending and ostium left anterior descending.Max pressure = 12 thao. Total duration = 15 seconds.     Max pressure = 12 thao. Total duration = 5 seconds.    Balloon reinflated a second time: Max pressure = 12 thao. Total duration = 5 seconds.

## 2019-07-31 NOTE — Clinical Note
The first balloon was inserted into the left anterior descending and ostium left anterior descending.Max pressure = 26 thao. Total duration = 10 seconds.

## 2019-07-31 NOTE — Clinical Note
The first balloon was inserted into the left anterior descending and ostium left anterior descending.Max pressure = 18 thao. Total duration = 5 seconds.     Max pressure = 20 thao. Total duration = 5 seconds.    Balloon reinflated a second time: Max pressure = 20 thao. Total duration = 5 seconds.  Balloon reinflated a third time: Max pressure = 20 thao. Total duration = 5 seconds.

## 2019-07-31 NOTE — Clinical Note
IABP inserted in the right femoral artery. IABP inserted with 50 cc balloon volume Lot number is: [id number]

## 2019-07-31 NOTE — Clinical Note
Potential access sites were evaluated for patency using ultrasound.   The left femoral artery and left femoral vein were selected. Access was obtained under with Sonosite guidance using a standard 18 guage needle with direct visualization of needle entry.

## 2019-07-31 NOTE — Clinical Note
The first balloon was inserted into the circumflex and middle circumflex.Max pressure = 10 thao. Total duration = 5 seconds.     Max pressure = 10 thao. Total duration = 8 seconds.    Balloon reinflated a second time: Max pressure = 10 thao. Total duration = 8 seconds.

## 2019-07-31 NOTE — Clinical Note
The first balloon was inserted into the left anterior descending and ostium left anterior descending.Max pressure = 12 thao. Total duration = 0 seconds.

## 2019-07-31 NOTE — Clinical Note
The first balloon was inserted into the left anterior descending and ostium left anterior descending.

## 2019-07-31 NOTE — Clinical Note
The first balloon was inserted into the left main and ostium left main.Max pressure = 12 thao. Total duration = 10 seconds.

## 2019-07-31 NOTE — Clinical Note
The first balloon was inserted into the circumflex and proximal circumflex.Max pressure = 14 thao. Total duration = 8 seconds.

## 2019-07-31 NOTE — Clinical Note
The first balloon was inserted into the right coronary artery and proximal right coronary artery.Max pressure = 14 thao. Total duration = 6 seconds.

## 2019-07-31 NOTE — Clinical Note
The first balloon was inserted into the left anterior descending and ostium left anterior descending.Max pressure = 16 thao. Total duration = 5 seconds.     Max pressure = 16 thao. Total duration = 5 seconds.    Balloon reinflated a second time: Max pressure = 16 thao. Total duration = 5 seconds.  Balloon reinflated a third time: Max pressure = 16 thao. Total duration = 6 seconds.

## 2019-07-31 NOTE — Clinical Note
The first balloon was inserted into the circumflex and ostium circumflex.Max pressure = 12 taho. Total duration = 5 seconds.

## 2019-07-31 NOTE — Clinical Note
The first balloon was inserted into the left anterior descending and ostium left anterior descending.Max pressure = 15 thao. Total duration = 5 seconds.     Max pressure = 20 thao. Total duration = 5 seconds.    Balloon reinflated a second time: Max pressure = 20 thao. Total duration = 5 seconds.

## 2019-07-31 NOTE — Clinical Note
The first balloon was inserted into the right coronary artery and proximal right coronary artery.Max pressure = 12 thao. Total duration = 10 seconds.

## 2019-07-31 NOTE — Clinical Note
The first balloon was inserted into the left main and ostium left main.Max pressure = 12 thao. Total duration = 10 seconds.     Max pressure = 12 thao. Total duration = 8 seconds.    Balloon reinflated a second time: Max pressure = 12 thao. Total duration = 8 seconds.  Balloon reinflated a third time: Max pressure = 10 thao. Total duration = 6 seconds.

## 2019-07-31 NOTE — Clinical Note
The first balloon was inserted into the circumflex and ostium circumflex.Max pressure = 26 thao. Total duration = 10 seconds.

## 2019-07-31 NOTE — Clinical Note
MAP via right radial A line is not consistent with the MAP on the IABP. MAPs via RRA is 62, MAPs on IABP mid to upper 70's. Dr. Ambrocio aware.

## 2019-07-31 NOTE — Clinical Note
The first balloon was inserted into the left main and left main.Max pressure = 16 thao. Total duration = 8 seconds.     Max pressure = 16 thao. Total duration = 8 seconds.    Balloon reinflated a second time: Max pressure = 16 thao. Total duration = 8 seconds.

## 2019-07-31 NOTE — Clinical Note
Max pressure = 12 thao. Total duration = 5 seconds.     Max pressure = 12 thao. Total duration = 5 seconds.    Balloon reinflated a second time: Max pressure = 12 thao. Total duration = 5 seconds.

## 2019-07-31 NOTE — Clinical Note
The first balloon was inserted into the left main and ostium left main.Max pressure = 12 thao. Total duration = 8 seconds.     Max pressure = 12 thao. Total duration = 8 seconds.    Balloon reinflated a second time: Max pressure = 12 thao. Total duration = 8 seconds.

## 2019-07-31 NOTE — Clinical Note
The first balloon was inserted into the right coronary artery and proximal right coronary artery.Max pressure = 16 thao. Total duration = 5 seconds.

## 2019-07-31 NOTE — Clinical Note
The first balloon was inserted into the left anterior descending and ostium left anterior descending.Max pressure = 16 thao.     Burst.

## 2019-07-31 NOTE — Clinical Note
Method of Cardioversion: defibrillated.   The arrhythmia was terminated.   Energy shock delivered: 200 joules.

## 2019-07-31 NOTE — Clinical Note
Multiple balloon inflation. The first balloon was inserted into the left anterior descending and ostium left anterior descending.Max pressure = 12 thao. Total duration = 5 seconds.     The second balloon was inserted into the Circumflex and ostium circumflx. Max pressure = 12 thao. Total duration = 5 seconds.   Max pressure = 12 thao. Total duration = 5 seconds.

## 2019-07-31 NOTE — Clinical Note
The first balloon was inserted into the left main and ostium left main.Max pressure = 16 thao. Total duration = 8 seconds.

## 2019-07-31 NOTE — Clinical Note
Pt arrived from 4E ICU with ECMO, IABP, intubated, sedated on multiple medications. Fentanyl gtt @ 100 mcg/hr, Versed gtt @ 8 mg/hr, Levophed gtt @ 0.13 mcg/kg/min, Amiodarone gtt @ 1 mg/min. Platelets are also infusing upon arrival.

## 2019-07-31 NOTE — Clinical Note
dry, intact, no bleeding and no hematoma. 6 Fr sheath removed from the LRA. TR band placed until hemostasis is obtained. Labeled.

## 2019-07-31 NOTE — Clinical Note
Potential access sites were evaluated for patency using ultrasound.   The left femoral artery was selected. Access was obtained under with Sonosite guidance using a micropuncture 21 guage needle with direct visualization of needle entry.

## 2019-07-31 NOTE — Clinical Note
The first balloon was inserted into the right coronary artery and RPLB.Max pressure = 12 thao. Total duration = 6 seconds.

## 2019-07-31 NOTE — Clinical Note
The first balloon was inserted into the left anterior descending and ostium left anterior descending.Max pressure = 20 thao. Total duration = 5 seconds.

## 2019-07-31 NOTE — Clinical Note
The first balloon was inserted into the left anterior descending and ostium left anterior descending.Max pressure = 14 thao.     Burst.

## 2019-07-31 NOTE — Clinical Note
The first balloon was inserted into the left main and left main.Max pressure = 18 thao. Total duration = 8 seconds.     Max pressure = 18 thao. Total duration = 6 seconds.    Balloon reinflated a second time: Max pressure = 18 thao. Total duration = 6 seconds.

## 2019-07-31 NOTE — Clinical Note
The first balloon was inserted into the right coronary artery and distal right coronary artery.Max pressure = 12 thao. Total duration = 6 seconds.     Max pressure = 12 thao. Total duration = 6 seconds.    Balloon reinflated a second time: Max pressure = 12 thao. Total duration = 6 seconds.

## 2019-07-31 NOTE — Clinical Note
Stent deployed in the ostium left anterior descending. Max pressure = 18 thao. Total duration = 8 seconds.

## 2019-07-31 NOTE — Clinical Note
The first balloon was inserted into the right coronary artery and distal right coronary artery.Max pressure = 12 thao. Total duration = 9 seconds.

## 2019-07-31 NOTE — Clinical Note
The first balloon was inserted into the left main and left main.Max pressure = 18 thao. Total duration = 6 seconds.

## 2019-07-31 NOTE — Clinical Note
The first balloon was inserted into the left anterior descending and proximal left anterior descending.Max pressure = 16 thao.

## 2019-07-31 NOTE — Clinical Note
Heparin intentionally with held per Dr. Mcclain because pt is on ECMO with Heparin for distal perfusion.

## 2019-07-31 NOTE — Clinical Note
The first balloon was inserted into the left main and ostium left main.Max pressure = 14 thao. Total duration = 10 seconds.

## 2019-07-31 NOTE — Clinical Note
Single balloon inflation. The first balloon was inserted into the left main.Max pressure = 11 thao. Total duration = 5 seconds.

## 2019-07-31 NOTE — Clinical Note
The first balloon was inserted into the left main and left main.Max pressure = 12 thao. Total duration = 6 seconds.     Max pressure = 12 thao. Total duration = 4 seconds.    Balloon reinflated a second time: Max pressure = 12 thao. Total duration = 4 seconds.

## 2019-07-31 NOTE — Clinical Note
Multiple balloon inflation. The first balloon was inserted into the left anterior descending and ostium left anterior descending. The second balloon was inserted into the Circumflex and ostium circumflx.

## 2019-07-31 NOTE — Clinical Note
The first balloon was inserted into the left main and ostium left main.Max pressure = 12 thao. Total duration = 10 seconds.     Max pressure = 18 thao. Total duration = 10 seconds.    Balloon reinflated a second time: Max pressure = 18 thao. Total duration = 10 seconds.

## 2019-08-01 PROBLEM — I46.9 CARDIAC ARREST (H): Status: ACTIVE | Noted: 2019-01-01

## 2019-08-01 NOTE — PROGRESS NOTES
ECMO Shift Summary:    Patient remains on [facility] ECMO, all equipment is functioning and alarms are appropriately set. RPM's 3300 with flow range 3.4-3.98 L/min. Sweep gas is at 4 LPM and FiO2 80%. Circuit remains free of air, clot and fibrin. Cannulas are secure with minimal bleeding from site. Extremities are cool. Suctioned ETT for bloody secretions.    Significant Shift Events: Patient arrived to unit from cath lab around 0430.  Patient required multiple volume replacements for flows/chugging.  He went into v-fib three times requiring shocks.  Calcium, mag, bicarb, saline and amiodarone also given.        Vent settings:  APRV   P High 30   P Low 0  T High 4  T Low 0.7  FiO2 100%.    No heparin running at this time, ACT range 191.    Urine output is adequate, blood loss was significant. Product given included 4 units of RBCs, 2.120 L of saline, 2 units of platelets, and 2 units of FFP.  .      Intake/Output Summary (Last 24 hours) at 8/1/2019 0633  Last data filed at 8/1/2019 0600  Gross per 24 hour   Intake 8519.18 ml   Output 2490 ml   Net 6029.18 ml       ECHO:  No results found for this or any previous visit.No results found for this or any previous visit.    CXR:  Recent Results (from the past 24 hour(s))   CT Head w/o Contrast    Narrative    CT HEAD W/O CONTRAST 8/1/2019 4:20 AM    History: s/p cardiac arrest, on ECMO    Comparison: None.    Technique: Using multidetector thin collimation helical acquisition  technique, axial, coronal and sagittal CT images from the skull base  to the vertex were obtained without intravenous contrast.     Findings:    No intracranial hemorrhage, mass effect, or midline shift. The  ventricles are proportionate to the cerebral sulci. The gray to white  matter differentiation of the cerebral hemispheres is preserved. The  basal cisterns are patent. Caval septum pellucidum.    Polypoid mucosal thickening of paranasal sinuses in the setting of  intubation. The mastoid air cells are  clear.       Impression    Impression: No acute intracranial pathology. Gray-white matter  differentiation remains intact.    I have personally reviewed the examination and initial interpretation  and I agree with the findings.    MIO ORLANDO MD   CT Chest Abdomen Pelvis w/o Contrast    Narrative    PRELIMINARY REPORT - The following report is a preliminary  interpretation.     Impression    IMPRESSION:  1. Near complete opacification of bilateral lungs with diffuse air  bronchogram. Differential diagnoses include diffuse alveolar  hemorrhage or ARDS. Diffuse infection and pulmonary edema or less  likely in this case.  2. Tip of the endotracheal tube in the upper thoracic trachea. There  is simple fluid occupying the lower trachea below the endotracheal  tube. Consider suction.   3. Small bilateral pneumothoraces with 2 apical chest tubes in place.  4. Radiopaque marker of the intra-aortic balloon pump at the level of  andrey although the inferior most below extends 2 cm below the left  renal artery.  5. Kidneys demonstrate abnormal enhancement (likely from prior IV  contrast administration) with blunting of corticomedullary  distinction, intraparenchymal kidney disease versus shock kidneys.   6. Moderate colonic stool burden.  Early fecalization in the distal  ileum suggest slow bowel transit.   7. Asymmetry thickening right pectoralis major/minor and right lateral  chest wall muscles are compatible with intramuscular hematoma.  Nondisplaced fracture of the right ribs described above.    Impression discussed with Dr. Barrett on the phone at 5:05 AM  08/01/2019.       Labs:  Recent Labs   Lab 08/01/19  0605 08/01/19  0430 08/01/19  0235 08/01/19  0234   PH 7.36 7.33* 7.27* 7.26*   PCO2 35 34* 41 39   PO2 396* 410* 413* 410*   HCO3 20* 18* 19* 18*   O2PER 100 100  --   --        Lab Results   Component Value Date    HGB 9.0 (L) 08/01/2019    PHGB <30 08/01/2019    PLT 60 (L) 08/01/2019    INR 2.76 (H) 07/31/2019     PTT >240 (HH) 07/31/2019    DD 4.3 (H) 08/01/2019    AXA 1.05 (HH) 07/31/2019         Plan is remain on [facility] ECMO.      Ijeoma Collazo RRT-NPS  8/1/2019 6:33 AM

## 2019-08-01 NOTE — PLAN OF CARE
Pt remains intubated, sedated, on ECMO (see ECMO specialist note), with IABP and thermogard.  Pt bleeding at beginning of shift. Goal temp for cooling changed to 35 C per MD. Dressings changed, hemostasis obtained, pt cleared for turns. Cis stopped d/t BIS 16 - BIS currently 63 with versed 8 mg/hr and fentanyl 75 mcg/hr. Pressors weaned to current of levophed 0.09 mcg/kg/min. EEG started, US and ECHO done. Pt received 1 L NS and 1.5 L Albumin for chugging. Pt has received 2 PRBC and 1 PLT with a plan for 1 more PRBC before shift change to reach hgb > 8. Family at bs briefly during shift. Plan to continue to monitor pt and notify MD as needed. See flowsheets and MAR for details.

## 2019-08-01 NOTE — PROGRESS NOTES
EEG CLINICAL NEUROPHYSIOLOGY PRELIMINARY REPORT    Hypothermic. Cistatracurium. Fentanyl 75 micrograms/hr, midazolam 8 mg/hr, minor increases in last several hours. At onset of recording approx 10 AM today significant suppression of electrocerebral activity but clear electrocerebral activity was present, with 5 to 10 uV activity present about 30% of the time. Over the last three hours there has been progressive attenuation of this activity and over last 90 min or so there are long stretches in which no clear electrocerebral activity is present even at high sensitivities and long interelectrode distances. While hypothermia and sedative drips may in part be responsible, the recent changes are not sufficient to explain this change. No seizures at any point.    Discussed with cardiac ICU fellow. Will continue video-EEG monitoring. Full report to follow.    Kwaku Freed MD  Pager [phone]

## 2019-08-01 NOTE — PROGRESS NOTES
[facility] Nurse Inpatient Adult Pressure Injury Prevention Assessment: ECMO  Initial     Positioning Tolerance: Good  Date of ECMO cannulation: 8/1/19  Presence of Ischemia: Bilateral tongue with deep purple areas, do not appear consistent with pressure from ETT, may be from biting tongue  Location of ischemia: Purple areas on bilateral tongue    Pressure Injury Prevention Interventions In Place:  Optifoam Dressing under ECMO Cannula, Z flow Positioner under head, Pillows for repositioning, TAPs Wedge Positioners in use, Heel off-loading boots and Mepilex Sacral Dressing   Current support surface: Standard  Low air loss mattress with pulsation        Pressure Injury Prevention Interventions Added:  All interventions in place        Plan of Care for Positioning and Pressure Injury Prevention  Reposition patient every 1-2 hours using TAP Wedges  Position head on Z flow positioner, mold indentation at areas of pressure points.  Pad ECMO IJ cannula with Optifoam (#[id number]) along face and scalp between skin and cannula and under Coban head wraps    Pad ECMO groin and chest cannula under rigid connectors with Optifoam or Soft cloth  Heel off-loading Boots at all times  Sacral Mepilex for Prevention, change every 5 days and prn  Low Air loss mattress    Patient History:   According to medical record: Atilio Szymanski [[id number]] (presented as Anonymous [facility]) is a 68 year old male with a history of HTN, HLD who was admitted on 7/31/2019 for PEA arrest. Patient had reportedly been complaining of exertional dyspnea for ~5 weeks. Tonight, he was more fatigued and developed lower extremity edema. His wife called his daughter, who decided to bring him into the hospital. On the way into [facility], via private vehicle, patient became unresponsive. EMS was called, where he was found to be in PEA. ACLS was started and he was transferred to [facility]. On arrival to OSH, he  remained in PEA. He had several rounds of epi, bicarb, magnesium, calcium.  Rhythm checks with persistent PEA. Chest x-ray showed pulmonary edema and bilateral chest tubes were placed, 500 cc out of the left chest tube, 200 cc the right chest tube.  Right femoral arterial line was placed, with some pulsatile in the waveform, however no palpable pulse in ACLS was continued during this time.  After over an hour of active ACLS, patient was demonstrating some movement and spontaneously opening eyes, so [facility] Cath Lab was called and VT/VF program was activated for ECMO.  Patient arrived to the Cath Lab at 2250 and ECMO was started at 2303.    Current Diet / Nutrition:     Orders Placed This Encounter      NPO for Medical/Clinical Reasons Except for: No Exceptions      Output:    I/O last 3 completed shifts:  In: 8624.18 [I.V.:5391.18]  Out: 2490 [Urine:650; Chest Tube:1840]  Containment: of urine/stool: Urinary Catheter    Risk Assessment:   Sensory Perception: 1-->completely limited  Moisture: 3-->occasionally moist  Activity: 1-->bedfast  Mobility: 1-->completely immobile  Nutrition: 1-->very poor  Friction and Shear: 2-->potential problem  Barber Score: 9    Labs:    Recent Labs   Lab 08/01/19  0647 08/01/19  0430   ALBUMIN  --  1.6*   HGB 9.1* 9.0*   INR 1.26*  --    WBC  --  6.1   CRP  --  <2.9       Focused Assessment: Full assessment today. ECMO in left groin    Pressure Injury Present::No    Education provided to: nurse  Discussed importance of:repositioning every 2 hours, their role in pressure injury prevention and moisture management  Discussed plan of care with Nurse  WOC Nurse follow-up plan:weekly    Mariana Stubbs RN, CWOCN

## 2019-08-01 NOTE — PROGRESS NOTES
Cardiology - CSI service    Interval events: Patient arrived to unit from cath lab around 0430.  Patient required multiple volume replacements for flows/chugging.  He went into v-fib three times requiring shocks.  Calcium, mag, bicarb, saline and amiodarone also given.     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 After ECMO cannulation, he had 7 stents total placed, to LM x1, pLAD, pLCx, x4 to RCA. He had considerable amount of bleeding from chest tubes bilaterally, Hgb dropped from 14.0 to 6.9. He received massive transfusion protocol. Also received Factor VII.          Temp:  [93.4  F (34.1  C)-96.8  F (36  C)] 96.8  F (36  C)  Heart Rate:  [0-216] 60  Resp:  [13] 13  MAP:  [54 mmHg-83 mmHg] 59 mmHg  Arterial Line BP: ()/(34-56) 97/34  FiO2 (%):  [100 %] 100 %  SpO2:  [99 %-100 %] 99 %  IABP via LFA, 1:1, triggered by EKG, augmented MAPs 70, L radial and distal pulse in tact   ECMO settings:   RPM's 3300 with flow range 3.4-3.98 L/min. Sweep gas is at 4 LPM and FiO2 80%.    I/O last 3 completed shifts:  In: 8624.18 [I.V.:5391.18]  Out: 2490 [Urine:650; Chest Tube:1840]    Hemodynamic drips: Levophed 0.12, vaso 4   Selected medications: amio 1, cis 4, fentanyl 50, versed 6.     Physical examination:  There were no vitals filed for this visit.  GENERAL APPEARANCE: Intubated, sedated. NAD.  HEENT: No icterus, ETT in place, OG tube in place  CARDIOVASCULAR: regular rate and rhythm, normal S1 and S2, no S3 or S4 and no murmur, click or rub. Normal PMI. Pulses dopplerable.  RESP: Coarse bilaterally. Mechanical ventilation.    GASTRO: Soft, bowel sounds hypoactive but present  GENITOURINARY: Bailey in place.  EXTREMITIES: Cool, 1+ edema, pulses dopplered, as above. VA ECMO cannulas in right groin, ThermoGard  NEURO: Sedated and intubated. Fent and Versed for sedation, as below.  INTEGUMENTARY:  No rashes. Cannula/Line sites CDI  LINES/TUBES/DRAINS: (noted below) V-A ECMO Cannulas L groin, arterial sheath and ThermoGard in R groin. R radial Arterial line. ETT. OG. Bailey Catheter.    Labs were reviewed.    BMP  Recent Labs   Lab 08/01/19  0430 08/01/19  0235 08/01/19  0100 08/01/19  0009 07/31/19  2330    136 129* 134 129*   POTASSIUM 4.5 3.9 3.5 3.7 3.7   CHLORIDE 112*  --   --   --  97   JEANNE 5.9*  --   --   --  6.7*   CO2 18*  --   --   --  14*   BUN 16  --   --   --  15   CR 1.12  --   --   --  1.25   * 153* 145* 184* 204*  195*     LFTs  Recent Labs   Lab 08/01/19  0430 07/31/19  2330   ALKPHOS 34* 45   * 318*   * 330*   BILITOTAL 0.6 0.5   PROTTOTAL 2.9* 3.6*   ALBUMIN 1.6* 1.7*      CBC  Recent Labs   Lab 08/01/19  0430  07/31/19  2330   WBC 6.1  --  13.3*   RBC 3.12*  --  3.85*   HGB 9.0*   < > 10.9*   HCT 28.1*  --  34.1*   MCV 90  --  89   MCH 28.8  --  28.3   MCHC 32.0  --  32.0   RDW 13.5  --  12.8   PLT 60*  --  126*    < > = values in this interval not displayed.     INR  Recent Labs   Lab 07/31/19 2330   INR 2.76*       Radiology:   1. Near complete opacification of bilateral lungs with diffuse air  bronchogram. Differential diagnoses include diffuse alveolar  hemorrhage or ARDS. Diffuse infection and pulmonary edema or less  likely in this case.  2. Tip of the endotracheal tube in the upper thoracic trachea. There  is simple fluid occupying the lower trachea below the endotracheal  tube. Consider suction.   3. Small bilateral pneumothoraces with 2 apical chest tubes in place.  4. Radiopaque marker of the intra-aortic balloon pump at the level of  andrey although the inferior most below extends 2 cm below the left  renal artery.  5. Kidneys demonstrate abnormal enhancement (likely from prior IV  contrast administration) with blunting of corticomedullary  distinction, intraparenchymal kidney disease versus shock kidneys.   6. Moderate colonic stool burden.  Early  fecalization in the distal  ileum suggest slow bowel transit.   7. Asymmetry thickening right pectoralis major/minor and right lateral  chest wall muscles are compatible with intramuscular hematoma.  Nondisplaced fracture of the right ribs described above.    IMPRESSION & PLAN  Atilio Szymanski [[id number]] (Anonymous Magenta [place]) is a 68 year old male with a history of HTN, HLD who was admitted on 7/31/2019 for PEA arrest. Arrest at home, no bystander CPR. Transferred from [facility]. ~120 min before placed on ECMO. 3V disease involving culprit LM.     Neurology: Intubated, sedated, paralyzed. Cooled to 36 degrees.  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 3V disease. ORIANA to LM x1, pLAD x1, pLCx x1, RCA x4.  Peripheral V-A ECMO inserted for refractory PEA. Approximately 120 min downtime. LA initially 9.0 on arrival.   TTE: complete ordered  --wean pressors/inotropes as able  --TTE  --continue ASA 81mg and ticagrelor 90mg BID  --hold temp at 36 given considerable bleeding  --ACT goal 180-200  --hold lipitor for now given likely hepatic injury during arrest  --hold ACE/ARB for now given likely reduced renal fxn after arrest  --holding beta blocker given shock       Pulmonary:  # Acute hypoxic/hypercarbic respiratory failure  # Pulmonary edema  # B/l Pleural effusion s/p b/l chest tube at OSH  # Possible diffuse alveolar hemorrhage Vent Settings:  ETT in stable position.  Now weaning vent requirements.  CXR: Lines in stable position. Pulmonary edema, b/l pleural effusions s/p b/l chest tube placement at OSH (not sterile).  --wean vent as able  --daily CXR  Chest CT  --Q2h ABGs for now       GI and Nutrition: No known medical hx.  --monitor BID LFTs  --NPO for now - nutrition consult pending feeding tube  placement  --bowel regimen - on hold for now  --GI Prophylaxis: PPI       Renal, Fluid and Electrolytes: Cr unknown baseline. 1.25 on arrival.  --monitor urine output  --maintain K>3 and Mg>2       Infectious Disease:  # Presumed aspiration No signs of infection. Leukocytosis c/w arrest. Blood cultures collected.   --vancomycin/zosyn x5 days for ECMO  --daily blood cultures  --monitor for signs of infection given cooling, lines, and leukocytosis   Hematology and Oncology:  # Acute blood loss anemia Receiving heparin for ECMO and ASA/ticagrelor for ORIANA.   --cryo PRN fibrinogen < 200; FFP for INR >2  --Transfuse for Hgb<10  --holding heparin given bleeding, will allow heparin through distal reperfusion cannula  - S/p massive transfusion protocol, s/p Factor VII  --US LE w/ arterial duplex per ECMO protocol   --DVT PPX: Heparin as above   Endocrinology: No known medical history. BG elevated.  --insulin gtt  --f/u HgbA1c    Lines: L femoral arterial and venous ECMO cannulae July 31, 2019  R femoral arterial line July 31, 2019  R radial arterial line July 31, 2019  ETT July 31, 2019  Bailey catheter July 31, 2019  OG tube July 31, 2019  Restraint: needed    Current lines are required for patient management       Family update by me today: Yes      Code Status:     The pt was discussed and evaluated with Dr. Solomon Ambrocio, attending physician, who agrees with the assessment and plan above.     Thank you for allowing me to care for this patient. This has been discussed with the attending physician.    La Ortega MD  Cardiology Fellow, PGY-5

## 2019-08-01 NOTE — PROGRESS NOTES
ECMO Shift Summary:    Patient remains on VA ECMO, all equipment is functioning and alarms are appropriately set. RPM's 3350 with flow range 2.5-4 L/min. Sweep gas is at 4 LPM and FiO2 80%. Circuit remains free of air, clot and fibrin. Cannulas are secure with no bleeding from site. Extremities are cool to touch. Suctioned ETT for small bloody secretions.    Significant Shift Events: Patient required multiple volume replacements for flows/chugging (see below)    Vent settings:  Ventilation Mode: APRV  (Airway Pressure Release Ventilation)  FiO2 (%): 40 %  Oxygen Concentration (%): 60 %  Resp: 13  .    Heparin is off.  ACT range 140s.     Urine output is as charted by RN, blood loss was significant this morning and has improved to minimal throughout the day. Product given included 4 RBCs, 1 platelet, 1500 ml Albumin, 1.5L NS.      Intake/Output Summary (Last 24 hours) at 8/1/2019 1805  Last data filed at 8/1/2019 1800  Gross per 24 hour   Intake 05473.42 ml   Output 5031 ml   Net 6921.42 ml       ECHO:  No results found for this or any previous visit.No results found for this or any previous visit.    CXR:  Recent Results (from the past 24 hour(s))   CT Head w/o Contrast    Narrative    CT HEAD W/O CONTRAST 8/1/2019 4:20 AM    History: s/p cardiac arrest, on ECMO    Comparison: None.    Technique: Using multidetector thin collimation helical acquisition  technique, axial, coronal and sagittal CT images from the skull base  to the vertex were obtained without intravenous contrast.     Findings:    No intracranial hemorrhage, mass effect, or midline shift. The  ventricles are proportionate to the cerebral sulci. The gray to white  matter differentiation of the cerebral hemispheres is preserved. The  basal cisterns are patent. Caval septum pellucidum.    Polypoid mucosal thickening of paranasal sinuses in the setting of  intubation. The mastoid air cells are clear.       Impression    Impression: No acute intracranial  pathology. Gray-white matter  differentiation remains intact.    I have personally reviewed the examination and initial interpretation  and I agree with the findings.    MIO ORLANDO MD   CT Chest Abdomen Pelvis w/o Contrast    Narrative    EXAMINATION: CT CHEST ABDOMEN PELVIS W/O CONTRAST  8/1/2019 4:20 AM      CLINICAL HISTORY: s/p cardiac arrest, on ECMO    COMPARISON: None        PROCEDURE COMMENTS: CT of the chest, abdomen, and pelvis was performed  without  intravenous and oral contrast. Axial MIP  images of the  chest, and coronal and sagittal reformatted images of the chest,  abdomen, and pelvis obtained.    FINDINGS:    Support devices: Tip of the endotracheal tube in the upper thoracic  trachea. Tip of the left inferior approach ECMO cannulae in the right  atrium. Tip of the right inferior approach central venous catheter at  the inferior jugular junction. Intra-aortic balloon pump with  radiopaque marker just below the andrey. Left femoral arterial line  tip in distal abdominal aorta.    Chest:  Heart size is enlarged with mild pericardial effusion. Advanced  three-vessel coronary artery calcification. Few prominent mediastinal  lymph nodes without lymphadenopathy by size criteria. Mild gas in the  left brachiocephalic vein. Retrosternal soft tissue thickening and  intermediate density fluid.    There is complete opacification of the lower trachea below the  endotracheal tube. There is near complete opacification of bilateral  lungs with diffuse air bronchogram. There are small bilateral  hydropneumothoraces with 2 apical chest tubes in place.     Abdomen/pelvis:  Nonenhanced CT demonstrates normal liver. Gallbladder is moderately  distended with mild gallbladder wall thickening. Small spleen. Mild  peripancreatic edema. Bilateral adrenal glands appear enlarged without  focal nodular mass. Kidneys demonstrate abnormal persistent  enhancement (from prior contrast administration) with blunting  of  cortical medullary distinction. No hydronephrosis. Urinary bladder is  decompressed with a Bailey catheter in place. Prostate is enlarged.  Bilateral hydrocele.    Tip of the enteric gastric tube projects over the gastric fundus.  Moderate colonic stool burden. Early fecalization in the distal ileum.  Diffuse mesenteric haziness. No pelvic fluid. Small bowel is within  normal limits. Moderate colonic stool burden in the cecum. Normal  appendix. No pelvic fluid or pneumoperitoneum.    Soft tissue/bones: Mild soft tissue stranding in the left groin.  Asymmetric thickening of right pectoralis major and minor and right  lateral chest wall muscles. Subcutaneous emphysema of bilateral chest  wall. Mildly displaced fracture of anterior right fourth and fifth  ribs.      Impression    IMPRESSION:  1. Near complete opacification of bilateral lungs with diffuse air  bronchogram. Differential diagnoses includes diffuse alveolar  hemorrhage or ARDS. Diffuse infection and pulmonary edema would have a  similar appearance.  2. Tip of the endotracheal tube in the upper thoracic trachea. There  is simple fluid occupying the lower trachea below the endotracheal  tube. Consider suction.   3. Small bilateral hydropneumothoraces with 2 apical chest tubes in  place.  4. Radiopaque marker of the intra-aortic balloon pump just below the  level of andrey with the inferior marker extends 2 cm below the left  renal artery. Other support devices as above.  5. Kidneys demonstrate abnormal persistent enhancement (likely from  prior contrast administration) with blunting of corticomedullary  distinction, intraparenchymal kidney disease versus shock kidneys.   6. Moderate colonic stool burden.  Early fecalization in the distal  ileum suggest slow bowel transit.   7. Asymmetry thickening right pectoralis major/minor and right lateral  chest wall muscles and right rib fractures are compatible with sequela  of cardiopulmonary resuscitation.  Nondisplaced fracture of the right  ribs described above and bilateral chest wall subcutaneous emphysema.    Impression discussed with Dr. Barrett on the phone at 5:05 AM  08/01/2019.    I have personally reviewed the examination and initial interpretation  and I agree with the findings.    IRIS BEARDEN, DO   US Carotid Bilateral    Narrative    Exam: Bilateral carotid duplex Doppler ultrasound dated 8/1/2019 11:57  AM    Clinical history: Cardiac Arrest on ECMO    Comparison Study: CT chest abdomen pelvis 8/1/2019    Ordering provider: Thuan Barrett    Technique: Grayscale (B-mode) and duplex and spectral Doppler  ultrasound of the extracranial internal carotid, external carotid,  vertebral artery origins, right brachiocephalic/subclavian and left  subclavian arteries. Velocity measurements obtained with angle  correction at or less than 60 degrees.    Findings:    Right side:     Plaque Morphology: No plaque identified.       Proximal CCA: 97/0 cm/sec     Mid CCA: 87/0 cm/sec     Distal CCA: 88/7 cm/sec     External CA: 53 cm/sec       Proximal ICA: 86/11 cm/sec     Mid ICA: 87/11 cm/sec     Distal ICA: 76/14 cm/sec       Vertebral artery: 117 cm/sec     Innominate artery: 10 cm/sec    ICA/CCA ratio: 0.99    Left side:     Plaque Morphology: No plaque identified.       Proximal CCA: 137 cm/sec     Distal CCA: 77 cm/sec     External CA: 53 cm/sec       Proximal ICA: 86/12 cm/sec     Mid ICA: 88/13 cm/sec     Distal ICA: 101/22 cm/sec       Vertebral artery: 65/13 cm/sec     ICA/CCA ratio: 1.32      Impression    Impression:    1. Right side:        Degree of stenosis of the internal carotid artery: Normal.    2. Left side:         Degree of stenosis of the internal carotid artery: Normal.    Consensus Panel Gray-Scale and Doppler US Criteria for Diagnosis of  ICA Stenosis (Radiology 11/2003) additionally modified by Patel et  al. in Journal of Vascular Surgery 1/2011, [id number])53-59.       Normal         ICA PSV <  140 cm/sec       Plaque Estimate None       ICA/CCA  PSV Ratio < 2.0       ICA EDV < 40 cm/sec       < 50%          ICA PSV < 140 cm/sec       Plaque Estimate < 50%       ICA/CCA  PSV Ratio < 2.0       ICA EDV < 40 cm/sec       50- 69%       ICA -230 cm/sec       Plaque Estimate > or = 50%       ICA/CCA PSV Ratio 2.0-4.0       ICA EDV  cm/sec         > or = 70%, less than near occlusion       ICA PSV > 230 cm/sec       Plaque Estimate > or = 50%       ICA/CCA Ratio > 4.0       ICA EDV > 100 cm/sec                                            Additional criteria from vascular surgery     > 80%       EDV > 120 cm/sec     KEKE BOSS   US Lower Extremity Arterial Duplex Bilateral    Narrative    BILATERAL LOWER EXTREMITY DUPLEX ARTERIAL ULTRASOUND 8/1/2019    CLINICAL HISTORY: Patient on ECMO. Baseline study.    COMPARISONS: None available.    REFERRING PROVIDER: SWETHA BHAT    TECHNIQUE: Doppler waveform ultrasound evaluation bilateral lower  extremity arteries.    FINDINGS: Abnormal waveforms as expected with ECMO. Left waveforms  more dampened than the right with slow upstroke.    Peak systolic velocities:    RIGHT:       SUPERFICIAL FEMORAL ARTERY. origin: 135/0 cm/s       SUPERFICIAL FEMORAL ARTERY, mid thigh: 135 cm/s       SUPERFICIAL FEMORAL ARTERY, distal thigh: 91 cm/s       POPLITEAL ARTERY: 71 cm/s       POSTERIOR TIBIAL ARTERY, ankle: 91 cm/s       ANTERIOR TIBIAL ARTERY, ankle: 89 cm/s    LEFT:       SUPERFICIAL FEMORAL ARTERY. origin: 57 cm/s       SUPERFICIAL FEMORAL ARTERY, mid thigh: 57 cm/s       SUPERFICIAL FEMORAL ARTERY, distal thigh: 27 cm/s       POPLITEAL ARTERY: 24 cm/s       POSTERIOR TIBIAL ARTERY, ankle: 29 cm/s       ANTERIOR TIBIAL ARTERY, ankle: 25 cm/s      Impression    IMPRESSION: Abnormal waveforms as expected with ECMO. Left waveforms  more dampened than the right. Dopplerable flow in the bilateral  posterior and anterior tibial arteries at the ankles.    ANNA  MD CANELO   XR Chest Port 1 View    Narrative    Exam: XR CHEST PORT 1 VW, 8/1/2019 12:00 PM    Indication: Check endotracheal tube placement and ECLS cannula  placement. DO NOT log-roll patient.  Place film under patient using  patient safety handling process.    Comparison: CT chest, abdomen and pelvis 8/1/2019    Findings:     Frontal supine chest x-ray. Enteric tube coursing below the diaphragm  with tip collimated off the field of view, sidehole projecting over  the stomach. Endotracheal tube projects about 9 cm above the andrey.  Anterior approach central catheter projects over the lower right  atrium. ECLS catheter projects over the right atrium. Trace bilateral  hydropneumothorax, with apically directed chest tubes. Diffuse  pulmonary consolidative opacities. Cardiomediastinal silhouette is  obscured. Intra-aortic balloon pump projects at the level of andrey.      Impression    Impression:     1. Endotracheal tube tip projects about 9 cm above the andrey.  Consider advancement.  2. ECLS catheter projects over the right atrium.  3. Inferior approach central catheter projects over the low right  atrium.  4. Diffuse pulmonary consolidative opacities may be seen with ARDS  versus infection versus pulmonary edema.  5. Trace bilateral hydropneumothorax with apically directed chest  tubes.    Findings were discussed with  by Dr. Fleming at 12:13 PM.  8/1/2019    I have personally reviewed the examination and initial interpretation  and I agree with the findings.    YAMILE QUIGLEY MD       Labs:  Recent Labs   Lab 08/01/19  1555 08/01/19  1359 08/01/19  1216 08/01/19  0942   PH 7.30* 7.26* 7.31* 7.36   PCO2 42 42 38 36   PO2 370* 353* 383* 387*   HCO3 21 19* 19* 20*   O2PER 40.0 40 40 60       Lab Results   Component Value Date    HGB 7.1 (L) 08/01/2019    PHGB <30 08/01/2019    PLT 93 (L) 08/01/2019    FIBR 174 (L) 08/01/2019    INR 1.55 (H) 08/01/2019    PTT 54 (H) 08/01/2019    DD 3.5 (H) 08/01/2019     AXA <0.10 08/01/2019    ANTCH 47 (L) 07/31/2019         Plan is to remain stable on VA ECMO.      Dion Horton, RRT  8/1/2019 6:05 PM

## 2019-08-01 NOTE — PROGRESS NOTES
SPIRITUAL HEALTH SERVICES  SPIRITUAL ASSESSMENT Progress Note  [facility] ([place]) 4E     REFERRAL SOURCE: nurse    Trice, Atilio's wife, and I talked about God and Norman and her bailey and Atilio's bailey. We prayed together through the efforts of the . I told their daughter and family and Trice that [facility] is ready to talk with them always.    PLAN: care provided    AMAIRANI Byers   Intern  Pager [phone]

## 2019-08-01 NOTE — PROGRESS NOTES
"CLINICAL NUTRITION SERVICES - ASSESSMENT NOTE     Nutrition Prescription    Recommendations:  Please notify RD when appropriate to start enteral nutrition.      Malnutrition Status:    Unable to determine due to lack of nutrition and weight history    Future Recommendations:  Once appropriate, initiate Impact Peptide @ 15 ml/hr via post-pyloric feeding tube. Adv by 10 ml q8h to 65 ml/hr (1560 ml/day) to provide 2340 kcals (29 kcal/kg), 147 g PRO (1.8 g/kg), 1201 ml free H2O, 100 g Fat (50% from MCTs), 218 g CHO and no Fiber daily.    - Do not adv if K+/Mg++ less than normal or Phos < 2   - H2O flushes 30 ml q4hr for tube patency  - Certavite daily vs Nephronex daily if on dialysis        REASON FOR ASSESSMENT  Atilio Szymanski is a 68 year old male assessed for Provider Order - RD to Assess and Order TF per Medical Nutrition Therapy Protocol    NUTRITION HISTORY  Unable to assess. Pt intubated, requiring VA-ECMO    CURRENT NUTRITION ORDERS  Diet: NPO x 2 days     LABS   Labs reviewed  Lactic acid 4.1 --> 5.3    MEDICATIONS  Medications reviewed  Norepinephrine   Vasopressin     ANTHROPOMETRICS  Height: 180.3 cm (5' 11\")  Most Recent Weight: 95 kg (209 lb 7 oz)    IBW: 78.2 kg  BMI: Overweight BMI 25-29.9  Weight History:  Wt Readings from Last 10 Encounters:   08/01/19 95 kg (209 lb 7 oz)     Dosing Weight: 82 kg (adjusted, based on IBW of 78.2 kg and admit wt of 95 kg on 8/1)     ASSESSED NUTRITION NEEDS  Estimated Energy Needs: 2050 - 2460 kcals/day (25 - 30 kcals/kg)  Justification: Maintenance  Estimated Protein Needs: 123 - 164 grams protein/day (1.5 - 2 grams of pro/kg)  Justification: Increased needs  Estimated Fluid Needs: (1 mL/kcal)   Justification: Maintenance, pending fluid status     PHYSICAL FINDINGS  See malnutrition section below.    MALNUTRITION  % Intake: Unable to assess  % Weight Loss: Unable to assess   Subcutaneous Fat Loss: None observed  Muscle Loss: None observed  Fluid Accumulation/Edema: " Mild  Malnutrition Diagnosis: Unable to determine due to lack of nutrition and weight history    NUTRITION DIAGNOSIS  Inadequate protein-energy intake related to NPO status r/t intubation and cooled as evidenced by met 0% nutrition needs x 2 days.       INTERVENTIONS  Implementation  Collaboration with other providers - FT placement today, possibly start feeding tomorrow.   Enteral Nutrition - Recommendations      Goals  Initiate nutrition support within 24 hrs      Monitoring/Evaluation  Progress toward goals will be monitored and evaluated per protocol.    Niki Fitch RD, LD  Pager: [phone]

## 2019-08-01 NOTE — PROGRESS NOTES
ECLS Cannulation Note:    Date on: 7/31/2019  Time on: 23:03  Surgeon: Lolly    Arterial Cannula: 17 Fr. In the Left Femoral Artery      Venous Cannula: 25 Fr. In the Left Femoral Vein      ECMO components include [employer] Lot # [id number]    Cannulation was performed in the Cath Lab, placement was verified by fluoroscopy, cannulas are in good position.  ISTAT and blood cooler are at bedside. Patient's blood type is O, positive.    Antonio Strong, RRT  8/1/2019 5:38 AM

## 2019-08-01 NOTE — PROGRESS NOTES
[facility], [place]  Procedure Note          Intra-Aortic Balloon Pump Insertion:       Viviana [place]  MRN# [id number]   August 1, 2019, 3:23 AM Indication: Cardiogenic shock           Procedure performed: August 1, 2019, 2:45 AM   Location: Cath lab   Catheter size: 8fr   Inserted: 11 inch introducer sheath   Catheter placed: Right femoral artery   Complications:: None   Assist initiated: 1:1 ratio   Percent augmentation: 100   Timing adjusted: Adjusted to achieve optimal assist   Verification of position: Fluoroscopy   Comments: None      Recorded by Asim Cast

## 2019-08-01 NOTE — PHARMACY-VANCOMYCIN DOSING SERVICE
Pharmacy Vancomycin Initial Note  Date of Service 2019  Patient's  1950  68 year old, male    Indication: ECMO/Aspiration PNA    Current estimated CrCl = Estimated Creatinine Clearance: 65.9 mL/min (A) (based on SCr of 1.28 mg/dL (H)).    Creatinine for last 3 days  2019: 11:30 PM Creatinine 1.25 mg/dL  2019:  4:30 AM Creatinine 1.12 mg/dL;  9:42 AM Creatinine 1.16 mg/dL;  3:55 PM Creatinine 1.28 mg/dL    Recent Vancomycin Level(s) for last 3 days  No results found for requested labs within last 72 hours.      Vancomycin IV Administrations (past 72 hours)                   vancomycin (VANCOCIN) 2,000 mg in sodium chloride 0.9 % 250 mL intermittent infusion (mg) 2,000 mg New Bag 19 1627                Nephrotoxins and other renal medications (From now, onward)    Start     Dose/Rate Route Frequency Ordered Stop    19 1530  vancomycin (VANCOCIN) 2,000 mg in sodium chloride 0.9 % 250 mL intermittent infusion      20 mg/kg × 95 kg  over 90 Minutes Intravenous EVERY 24 HOURS 19 1526 19 1529    19 1515  piperacillin-tazobactam (ZOSYN) 3.375 g vial to attach to  mL bag      3.375 g  over 30 Minutes Intravenous EVERY 6 HOURS 19 1500 19 1459    19 2330  phenylephrine (SHUN-SYNEPHRINE) 50 mg in sodium chloride 0.9 % 250 mL infusion      0.5-6 mcg/kg/min × 95 kg (Dosing Weight)  14.3-171 mL/hr  Intravenous CONTINUOUS 19 2330  vasopressin (VASOSTRICT) 40 Units in D5W 40 mL infusion      0.5-4 Units/hr  0.5-4 mL/hr  Intravenous CONTINUOUS 19 232  norepinephrine (LEVOPHED) 16 mg in  mL infusion      0.03-0.4 mcg/kg/min × 95 kg (Dosing Weight)  2.7-35.6 mL/hr  Intravenous CONTINUOUS PRN 19 232            Contrast Orders - past 72 hours (72h ago, onward)    Start     Dose/Rate Route Frequency Ordered Stop    19 0312  iopamidol (ISOVUE-370) solution  Status:  Discontinued        ONCE  PRN 08/01/19 0316 08/01/19 0417                Plan:  1.  Start vancomycin  20 mg/kg iv q24h = 2000 mg IV q24h.   2.  Goal Trough Level: 15-20 mg/L   3.  Pharmacy will check trough levels as appropriate in 1-3 Days.    4. Serum creatinine levels will be ordered daily for the first week of therapy and at least twice weekly for subsequent weeks.    5. [place] method utilized to dose vancomycin therapy: per ECMO protocol    Shalini MannD

## 2019-08-01 NOTE — PLAN OF CARE
Back from cath lab at 0430. Cooled to 34 degrees C via thermogard. Paralyzed, sedated. Sinus karrie. IABP, 1:1, 100% augmented. VA ECMO. 0520 Vfib. Shocked x2 at 200J. Given 2g Ca, 4g Mg, 300 mg amio per orders. Shocked again at 0529 at 200J with return to SR/SB. Amio gtt started per orders. 2 amps bicarb, 2L NS given. RBC x4, FFPx2, pltx2 given per MTP. Levo at 0.12 and vaso at 4 to maintain maps>65. CT output decreasing into AM, minimal output at 0700. Abnormal labs called to Dr. Barrett. Continue with POC. Notify CSI with concerns.    Admitted/transferred from: [facility] to [facility] cathlab to   Reason for admission/transfer: VT/VF arrest   2 RN skin assessment: unable to turn d/t instability. Only able to assess anterior.  Result of skin assessment and interventions/actions:  Patient belongings: Wallet, ring, change given to daughter and son

## 2019-08-02 NOTE — PLAN OF CARE
Sedated. Slight contraction noted in all extremities overnight. Sinus bradycardia/sinus rhythm. Rare PVC. IABP 1:1, 100% augmented. Continues on ECMO. Given 1 pRBC, 2 plt, 2 FFP, 250 albumin. CT output elevated, 100-200 ml/hr, serosanguinous. MD aware. UOP decreasing overnight, <30, not increasing with fluid admin. Dr. Barrett notified. Began rewarming at 0500. 0.25 degrees per hour to 36 C per Dr. Barrett. Continue to monitor per orders. Critical values called to Dr. Barrett. Continue with POC. Notify CSI with concerns.

## 2019-08-02 NOTE — PLAN OF CARE
Pt remains intubated, sedated, on ECMO (see ECMO specialist note) with IABP and CRRT. Pt in vfib at beginning of shift, shocked 3 times, received: 4mg Mg, 100mg lidocaine, 150mg amio bolus and amio gtt increased to 1 mg/min. Pt went to cath lab, 1 stent placed. No episodes of vfib since cath lab. Pt started on CRRT with goal I=O, currently unable to pull fluids d/t hypotension and increased pressor requirements. Levophed increased to 0.2 mcg/kg/min during shift and vasopressin added at 2 units/hr to maintain MAPs around 65. Feeding tube placed after cath lab - caused nose bleed. Nose currently packed with afrin soaked 2x2s. Awaiting ok to use feeding tube order. Pt received 1 PRBC, 2 PLT, and 750 ml Albumin during shift. Will continue to monitor pt and notify MD as needed. See flowsheets for details.

## 2019-08-02 NOTE — PROGRESS NOTES
CRRT INITIATION NOTE    Consent for CRRT Completed:  YES  Patient s Vascular Access: VA ECMO                    DATA  Procedure:   Start Time:  1304  Machine#:  5  Parameters:intake = output  Filter: M150  Blood Flow:  200 mL/min  Replacement Solution:    Replacement Solution Rate: 200 mL/hr   Dialysate Flow Rate:  1200 mL/hr   Patient Removal Rate:  0 mL/hr  Anticoagulation Type and Rate:   Clot Times:     ASSESSMENT:   How Patient Tolerated Initiation: well   Vital Signs: HR 67, SpO2 100%, BP 93/45, MAP 61   Initial Pressures:    Access:  149    Filter:  2    Return: -22     TMP:  58    Change in Filter Pressure:       INTERVENTIONS:  Initiate CRRT.    PLAN:  Continue plan of care. Contact resource RN [place] [phone].

## 2019-08-02 NOTE — PROGRESS NOTES
ECMO Shift Summary:    Patient remains on VA ECMO, all equipment is functioning and alarms are appropriately set. RPM's 3350 with flow range 3.7-3.9 L/min. Sweep gas is at 4 LPM and FiO2 60%. Circuit remains free of air, clot and fibrin. Cannulas are secure with no bleeding from site. Extremities are cool to touch. Suctioned ETT for small bloody secretions.    Significant Shift Events: Gave multiple units of product and was able to wean the FiO2 on circuit.    Vent settings:  Ventilation Mode: APRV  (Airway Pressure Release Ventilation)  FiO2 (%): 40 %  Oxygen Concentration (%): 60 %  Resp: 13  .    Heparin has NOT been started.  ACT range 146-175.    Product given included 1unit PRBC, 1unit FFP, and 2units Plts.      Intake/Output Summary (Last 24 hours) at 8/2/2019 0621  Last data filed at 8/2/2019 0600  Gross per 24 hour   Intake 7435.74 ml   Output 4398 ml   Net 3037.74 ml       ECHO:  No results found for this or any previous visit.No results found for this or any previous visit.    CXR:  Recent Results (from the past 24 hour(s))   US Carotid Bilateral    Narrative    Exam: Bilateral carotid duplex Doppler ultrasound dated 8/1/2019 11:57  AM    Clinical history: Cardiac Arrest on ECMO    Comparison Study: CT chest abdomen pelvis 8/1/2019    Ordering provider: Thuan Barrett    Technique: Grayscale (B-mode) and duplex and spectral Doppler  ultrasound of the extracranial internal carotid, external carotid,  vertebral artery origins, right brachiocephalic/subclavian and left  subclavian arteries. Velocity measurements obtained with angle  correction at or less than 60 degrees.    Findings:    Right side:     Plaque Morphology: No plaque identified.       Proximal CCA: 97/0 cm/sec     Mid CCA: 87/0 cm/sec     Distal CCA: 88/7 cm/sec     External CA: 53 cm/sec       Proximal ICA: 86/11 cm/sec     Mid ICA: 87/11 cm/sec     Distal ICA: 76/14 cm/sec       Vertebral artery: 117 cm/sec     Innominate artery: 10  cm/sec    ICA/CCA ratio: 0.99    Left side:     Plaque Morphology: No plaque identified.       Proximal CCA: 137 cm/sec     Distal CCA: 77 cm/sec     External CA: 53 cm/sec       Proximal ICA: 86/12 cm/sec     Mid ICA: 88/13 cm/sec     Distal ICA: 101/22 cm/sec       Vertebral artery: 65/13 cm/sec     ICA/CCA ratio: 1.32      Impression    Impression:    1. Right side:        Degree of stenosis of the internal carotid artery: Normal.    2. Left side:         Degree of stenosis of the internal carotid artery: Normal.    Consensus Panel Gray-Scale and Doppler US Criteria for Diagnosis of  ICA Stenosis (Radiology 11/2003) additionally modified by Patel et  al. in Journal of Vascular Surgery 1/2011, [phone])[phone].       Normal         ICA PSV < 140 cm/sec       Plaque Estimate None       ICA/CCA  PSV Ratio < 2.0       ICA EDV < 40 cm/sec       < 50%          ICA PSV < 140 cm/sec       Plaque Estimate < 50%       ICA/CCA  PSV Ratio < 2.0       ICA EDV < 40 cm/sec       50- 69%       ICA -230 cm/sec       Plaque Estimate > or = 50%       ICA/CCA PSV Ratio 2.0-4.0       ICA EDV  cm/sec         > or = 70%, less than near occlusion       ICA PSV > 230 cm/sec       Plaque Estimate > or = 50%       ICA/CCA Ratio > 4.0       ICA EDV > 100 cm/sec                                            Additional criteria from vascular surgery     > 80%       EDV > 120 cm/sec     KEKE BOSS   US Lower Extremity Arterial Duplex Bilateral    Narrative    BILATERAL LOWER EXTREMITY DUPLEX ARTERIAL ULTRASOUND 8/1/2019    CLINICAL HISTORY: Patient on ECMO. Baseline study.    COMPARISONS: None available.    REFERRING PROVIDER: SWETHA BHAT    TECHNIQUE: Doppler waveform ultrasound evaluation bilateral lower  extremity arteries.    FINDINGS: Abnormal waveforms as expected with ECMO. Left waveforms  more dampened than the right with slow upstroke.    Peak systolic velocities:    RIGHT:       SUPERFICIAL FEMORAL ARTERY. origin:  135/0 cm/s       SUPERFICIAL FEMORAL ARTERY, mid thigh: 135 cm/s       SUPERFICIAL FEMORAL ARTERY, distal thigh: 91 cm/s       POPLITEAL ARTERY: 71 cm/s       POSTERIOR TIBIAL ARTERY, ankle: 91 cm/s       ANTERIOR TIBIAL ARTERY, ankle: 89 cm/s    LEFT:       SUPERFICIAL FEMORAL ARTERY. origin: 57 cm/s       SUPERFICIAL FEMORAL ARTERY, mid thigh: 57 cm/s       SUPERFICIAL FEMORAL ARTERY, distal thigh: 27 cm/s       POPLITEAL ARTERY: 24 cm/s       POSTERIOR TIBIAL ARTERY, ankle: 29 cm/s       ANTERIOR TIBIAL ARTERY, ankle: 25 cm/s      Impression    IMPRESSION: Abnormal waveforms as expected with ECMO. Left waveforms  more dampened than the right. Dopplerable flow in the bilateral  posterior and anterior tibial arteries at the ankles.    ANNA LEMOS MD   XR Chest Port 1 View    Narrative    Exam: XR CHEST PORT 1 VW, 8/1/2019 12:00 PM    Indication: Check endotracheal tube placement and ECLS cannula  placement. DO NOT log-roll patient.  Place film under patient using  patient safety handling process.    Comparison: CT chest, abdomen and pelvis 8/1/2019    Findings:     Frontal supine chest x-ray. Enteric tube coursing below the diaphragm  with tip collimated off the field of view, sidehole projecting over  the stomach. Endotracheal tube projects about 9 cm above the andrey.  Anterior approach central catheter projects over the lower right  atrium. ECLS catheter projects over the right atrium. Trace bilateral  hydropneumothorax, with apically directed chest tubes. Diffuse  pulmonary consolidative opacities. Cardiomediastinal silhouette is  obscured. Intra-aortic balloon pump projects at the level of andrey.      Impression    Impression:     1. Endotracheal tube tip projects about 9 cm above the andrey.  Consider advancement.  2. ECLS catheter projects over the right atrium.  3. Inferior approach central catheter projects over the low right  atrium.  4. Diffuse pulmonary consolidative opacities may be seen with  ARDS  versus infection versus pulmonary edema.  5. Trace bilateral hydropneumothorax with apically directed chest  tubes.    Findings were discussed with  by Dr. Fleming at 12:13 PM.  8/1/2019    I have personally reviewed the examination and initial interpretation  and I agree with the findings.    YAMILE QUIGLEY MD       Labs:  Recent Labs   Lab 08/02/19  0547 08/02/19  0343 08/02/19  0159 08/02/19  0016   PH 7.38 7.37 7.35 7.36   PCO2 36 37 41 37   PO2 129* 161* 116* 138*   HCO3 21 22 22 21   O2PER 40 40% 40 40       Lab Results   Component Value Date    HGB 8.9 (L) 08/02/2019    PHGB 30 (H) 08/02/2019    PLT 77 (L) 08/02/2019    FIBR 250 08/02/2019    INR 1.70 (H) 08/02/2019    PTT 48 (H) 08/02/2019    DD 12.6 (H) 08/02/2019    AXA <0.10 08/02/2019    ANTCH 47 (L) 07/31/2019         Plan is to remain on VA ECMO support.    Asim Foss, RRT-NPS, CPFT  8/2/2019 6:21 AM

## 2019-08-02 NOTE — CONSULTS
Nephrology Initial Consult  August 2, 2019      Atilio Szymanski MRN:[id number] YOB: 1950  Date of Admission:7/31/2019  Primary care provider: No primary care provider on file.  Requesting physician: La Ortega    ASSESSMENT AND RECOMMENDATIONS:   This is a 68 year old male with a PMHx significant for HTN, HLD who had a PEA arrest en route to [facility], no ROSC for 1 hour and VA-ECMO placed at [facility]. Nephrology are being consulted for anuric BLADIMIR.    Anuric BLADIMIR  Baseline Cr on admission was 1.12, and geraldine to 1.75 this morning. He was making urine yesterday, but has become oliguric today (~15cc/hr). His obligate intakes far exceed his ability to remove this volume, and given his prolonged downtime and failure to achieve ROSC, he will likely need RRT as a mechanism to compensate for his ischemic tubular injury suffered from his PEA arrest. His family do not know of any CKD at baseline, and he is a large male so it is plausible that his elevated Cr at baseline is due to muscle mass. He was not taking NSAIDs at home. He did not have any recent infection.   - Will plan for CRRT via ECMO circuit, dose 25cc/kg/hr, K4 bath     Electrolytes  Na 144, K 4.0, Cl 110. No acute indication for HD based on electrolytes    BP/Volume  Oliguric, physical exam suggests a hypervolemic state. Received MTP in the cath lab this morning. He is unlikley to return renal function in the coming days, and his obligate intakes cannot be decreased much further. We will plan for CRRT and match I=O for the first 12 hours, followed by removal of 0-50cc/hr fluid if tolerating RRT well and vasopressor support is minimal. Currently on norepi and vasopressin.    Anemia  Hgb 9.0, received MTP in the cath lab. Baseline hgb 14, dropped to 6.9.     Acid/Base balance  HCO3 21, lactic acid rising 5.3 this afternoon. Treat the hypoperfusion, no need for bicarb at this time. pH 7.3/43/142    BMD  Ca 7.8, Phos 4.9. Patient does not have CKD, no  need for PTH or vit D    Recommendations were communicated to primary team via note    Seen and discussed with Dr. Nikki Ortega MD   [phone]    REASON FOR CONSULT: BLADIMIR after PEA arrest    HISTORY OF PRESENT ILLNESS:  Admitting provider and nursing notes reviewed    Atilio Szymanski is a 68 year old male with a PMHx significant for HTN, HLD who had a PEA arrest en route to [facility], no ROSC for 1 hour and VA-ECMO placed at [facility].    He has been having HURTADO for the last 5 weeks, and while his daughter was driving him to [facility], he became unresponsive and when EMS arrived he was in PEA arrest. He underwent an hour of ACLS and no ROSC. He underwent VA-ECMO. In the [facility] lab, he was found to have severe 3 vessel disease with a culprit 100% occluded left main. His hgb dropped from 14 to 6.9, and the MTP was initiated.    His family deny any NSAID use at home or any family history of kidney stones or kidney disease.     EEG reveals what is suggestive of severe diffuse cerebral dysfunction.     PAST MEDICAL HISTORY:  Reviewed with patient on 08/02/2019     HTN, HLD    No past surgical history on file.     MEDICATIONS:  PTA Meds  Prior to Admission medications    Not on File      Current Meds    amiodarone  300 mg Intravenous Once     artificial tears   Both Eyes Q8H     aspirin  81 mg Per Feeding Tube Daily     calcium chloride  2 g Intravenous Once     magnesium sulfate  4 g Intravenous Once     pantoprazole (PROTONIX) IV  40 mg Intravenous Daily     piperacillin-tazobactam  3.375 g Intravenous Q6H     sodium chloride (PF)  3 mL Intracatheter Q8H     ticagrelor  90 mg Oral BID     vancomycin (VANCOCIN) IV  20 mg/kg Intravenous Q24H     Infusion Meds    amiodarone 1 mg/min (08/02/19 0755)     cisatracurium (NIMBEX) infusion ADULT Stopped (08/01/19 1013)     IV fluid REPLACEMENT ONLY       EPINEPHrine IV infusion ADULT       fentaNYL 100 mcg/hr (08/02/19 0700)     heparin 2 unit/mL in 0.9% NaCl 3 mL/hr  (08/02/19 0700)     HEParin       insulin (regular) Stopped (08/01/19 1400)     - MEDICATION INSTRUCTIONS -       midazolam 8 mg/hr (08/02/19 0700)     nitroPRUsside (NIPRIDE) IV infusion ADULT/PEDS GREATER than or EQUAL to 45 kg std conc       norepinephrine 0.12 mcg/kg/min (08/02/19 0700)     phenylephrine       vasopressin (PITRESSIN) infusion ADULT (40 mL) Stopped (08/01/19 1238)       ALLERGIES:    Allergies not on file    REVIEW OF SYSTEMS:  A comprehensive of systems was negative except as noted above.    SOCIAL HISTORY:   Social History     Socioeconomic History     Marital status:      Spouse name: Not on file     Number of children: Not on file     Years of education: Not on file     Highest education level: Not on file   Occupational History     Not on file   Social Needs     Financial resource strain: Not on file     Food insecurity:     Worry: Not on file     Inability: Not on file     Transportation needs:     Medical: Not on file     Non-medical: Not on file   Tobacco Use     Smoking status: Not on file   Substance and Sexual Activity     Alcohol use: Not on file     Drug use: Not on file     Sexual activity: Not on file   Lifestyle     Physical activity:     Days per week: Not on file     Minutes per session: Not on file     Stress: Not on file   Relationships     Social connections:     Talks on phone: Not on file     Gets together: Not on file     Attends Yazdanism service: Not on file     Active member of club or organization: Not on file     Attends meetings of clubs or organizations: Not on file     Relationship status: Not on file     Intimate partner violence:     Fear of current or ex partner: Not on file     Emotionally abused: Not on file     Physically abused: Not on file     Forced sexual activity: Not on file   Other Topics Concern     Not on file   Social History Narrative     Not on file     Reviewed with patient   Family accompanies Atilio Szymanski in hospital room    FAMILY  "MEDICAL HISTORY:   No history of renal disease  Reviewed with patient     PHYSICAL EXAM:   Temp  Av.8  F (34.9  C)  Min: 93.4  F (34.1  C)  Max: 97.5  F (36.4  C)  Arterial Line BP  Min: 77/36  Max: 299/280  Arterial Line MAP (mmHg)  Av.5 mmHg  Min: 51 mmHg  Max: 280 mmHg      No data recorded Resp  Av.1  Min: 10  Max: 27  FiO2 (%)  Av.9 %  Min: 40 %  Max: 100 %  SpO2  Av.9 %  Min: 95 %  Max: 100 %       Temp 95.5  F (35.3  C)   Resp 13   Ht 1.803 m (5' 11\")   Wt 96 kg (211 lb 10.3 oz)   SpO2 99%   BMI 29.52 kg/m     Date 19 0700 - 19 0659   Shift 5756-6092 2341-5225 2468-3627 24 Hour Total   INTAKE   I.V. 161.7   161.7   Blood Components 302   302   Shift Total(mL/kg) 463.7(4.83)   463.7(4.83)   OUTPUT   Urine 10   10   Chest Tube(mL/kg) 100(1.04)   100(1.04)   Shift Total(mL/kg) 110(1.15)   110(1.15)   Weight (kg) 96 96 96 96      Admit Weight: 95 kg (209 lb 7 oz)     GENERAL APPEARANCE: intubated, sedated  EYES: no scleral icterus, pupils equal  Endo: no goiter, no moon facies  Lymphatics: no cervical or supraclavicular LAD  Pulmonary: lungs clear to auscultation with equal breath sounds bilaterally, no clubbing  CV: regular rhythm, normal rate, no rub   - JVD no   - Edema +1 in LE  GI: soft, nontender, normal bowel sounds  MS: no evidence of inflammation in joints, no muscle tenderness  : + ray  SKIN: no rash, warm, dry, no cyanosis  NEURO: face symmetric, no asterixis     LABS:   CMP  Recent Labs   Lab 19  0720 19  0343 19  0334 19  2200  19  1555  19  0647 19  0430     --  144 144  --  145*   < >  --  140   POTASSIUM 4.0  --  3.9 3.7  --  3.5   < > 3.9 4.5   CHLORIDE 110*  --  111* 110*  --  112*   < >  --  112*   CO2   --  22 23  --  24   < >  --  18*   ANIONGAP 12  --  10 11  --  8   < >  --  10   *  129* 129* 130* 130*   < > 107*  108*   < >  --  168*   BUN 26  --  24 22  --  18   < >  --  16   CR 1.75*  " --  1.65* 1.37*  --  1.28*   < >  --  1.12   GFRESTIMATED 39*  --  42* 53*  --  57*   < >  --  47*   GFRESTBLACK 45*  --  49* 61  --  66   < >  --  54*   JEANNE 7.8*  --  7.8* 7.5*  --  7.2*   < >  --  5.9*   MAG 2.5*  --  2.6* 2.4*  --  1.9   < > 2.2 1.7   PHOS  --   --  4.9*  --   --   --   --  3.5 3.7   PROTTOTAL 4.7*  --  4.8* 4.3*  --  3.8*   < >  --  2.9*   ALBUMIN 2.9*  --  3.0* 2.9*  --  2.4*   < >  --  1.6*   BILITOTAL 2.5*  --  2.5* 2.2*  --  1.2   < >  --  0.6   ALKPHOS 34*  --  32* 31*  --  28*   < >  --  34*   *  --  456* 482*  --  550*   < >  --  529*   *  --  105* 106*  --  108*   < >  --  147*    < > = values in this interval not displayed.     CBC  Recent Labs   Lab 08/02/19 0720 08/02/19 0334 08/01/19 2200 08/01/19  1555   HGB 8.6* 8.9* 8.1* 7.1*   WBC 6.1 4.9 4.1 3.8*   RBC 2.94* 3.05* 2.80* 2.47*   HCT 25.3* 26.2* 24.1* 21.3*   MCV 86 86 86 86   MCH 29.3 29.2 28.9 28.7   MCHC 34.0 34.0 33.6 33.3   RDW 14.4 14.2 14.2 14.1   PLT 85* 77* 77* 93*     INR  Recent Labs   Lab 08/02/19 0720 08/02/19 0334 08/01/19 2200 08/01/19  1555   INR 1.83* 1.70* 1.77* 1.55*   PTT 48* 48* 52* 54*     ABG  Recent Labs   Lab 08/02/19  0720 08/02/19  0547 08/02/19  0343 08/02/19  0159   PH 7.38 7.38 7.37 7.35   PCO2 37 36 37 41   PO2 94 129* 161* 116*   HCO3 22 21 22 22   O2PER 40 40 40% 40      URINE STUDIES  Recent Labs   Lab Test 08/01/19  0431   COLOR Light Yellow   APPEARANCE Clear   URINEGLC Negative   URINEBILI Negative   URINEKETONE Negative   SG 1.034   UBLD Large*   URINEPH 6.5   PROTEIN 100*   NITRITE Negative   LEUKEST Negative   RBCU 11*   WBCU 14*     No lab results found.  PTH  No lab results found.  IRON STUDIES  No lab results found.    IMAGING:  All imaging studies reviewed by me.     Makayla Ortega MD

## 2019-08-02 NOTE — PROGRESS NOTES
ECMO Shift Summary:    Patient remains on VA ECMO, all equipment is functioning and alarms are appropriately set. RPM's 3348 with flow range 3.2-3.8 L/min. Sweep gas is at 5 LPM and FiO2 60%. Circuit remains free of air, clot and fibrin. Cannulas are secure with no bleeding from site. Extremities are warm. Suctioned ETT for secretions.    Significant Shift Events: traveled to heart cath for 8th stent after 3 shocks.    Vent settings:  Ventilation Mode: APRV  (Airway Pressure Release Ventilation)  FiO2 (%): 40 %  Oxygen Concentration (%): 40 %  Resp: 13  .    Heparin is not currently running, ACT range 275-171.  Urine output is as charted , blood loss was oozing from groin lines and both nares post feeding tube attempts. Product given included 1 unit(s) PRBC, 2 unit(s) PLTs and 750 of albumin.      Intake/Output Summary (Last 24 hours) at 8/2/2019 1801  Last data filed at 8/2/2019 1715  Gross per 24 hour   Intake 8118.23 ml   Output 3057 ml   Net 5061.23 ml       ECHO:  No results found for this or any previous visit.No results found for this or any previous visit.    CXR:  Recent Results (from the past 24 hour(s))   XR Chest Port 1 View    Narrative    Exam: XR CHEST PORT 1 VW, 8/2/2019 2:05 AM    Indication: Check endotracheal tube placement and ECLS cannula  placement. DO NOT log-roll patient.  Place film under patient using  patient safety handling process.    Comparison: 8/1/2019.    Findings:     Frontal supine chest x-ray. Enteric tube coursing below the diaphragm  with tip collimated off the field of view, sidehole projecting over  the stomach. Endotracheal tube projects about 8 cm above the andrey.  Anterior approach central catheter projects over the lower right  atrium. ECLS catheter projects over the right atrium. Intra-aortic  balloon pump projects at the level of andrey.    Trace bilateral hydropneumothorax, with apically directed chest tubes,  stable to slightly decreased. Diffuse pulmonary  consolidative  opacities have significantly improved since prior exam. Cardiomegaly.       Impression    Impression:   1. Endotracheal tube tip projects about 8 cm above the andrey.  2. ECLS catheter projects over the right atrium.  3. Inferior approach central catheter projects over the low right  atrium.  4. Improving diffuse pulmonary consolidative opacities , may be seen  with ARDS versus infection versus pulmonary edema.  5. Trace bilateral hydropneumothorax with apically directed chest  tubes, stable to slightly decreased.    RACHELE BOYLE MD   XR Abdomen Port 1 View    Narrative    Examination:  XR ABDOMEN PORT 1 VW 8/2/2019 2:48 PM     Comparison: None.    History: feeding tube    Findings: The NG/NG tube loops within the stomach with the tip in  sidehole projecting over the stomach. The feeding tube tip projects  over the third portion of the duodenum. A lower approach central  venous catheter with the tip projecting at the inferior cavoatrial  junction. ECMO catheter cannula projects over the right atrium. IABP  balloon marker projects at the level of the andrey. Minimal amount of  urinary excretion is seen bilaterally. No dilated loops of bowel,  pneumatosis, or portal venous gas. Coarse bilateral pulmonary  opacities.      Impression    Impression: The feeding tube tip projects over the third portion of  the duodenum.     I have personally reviewed the examination and initial interpretation  and I agree with the findings.    SUJIT BOOTH MD       Labs:  Recent Labs   Lab 08/02/19  1615 08/02/19  1339 08/02/19  0958 08/02/19  0720   PH 7.29* 7.30* 7.34* 7.38   PCO2 46* 43 39 37   PO2 113* 142* 127* 94   HCO3 22 21 21 22   O2PER 40 40 40 40       Lab Results   Component Value Date    HGB 8.9 (L) 08/02/2019    PHGB 30 (H) 08/02/2019     (L) 08/02/2019    FIBR 321 08/02/2019    INR 2.02 (H) 08/02/2019    PTT 83 (H) 08/02/2019    DD >20.0 (H) 08/02/2019    AXA 0.12 08/02/2019    ANTCH 54 (L)  08/02/2019         Plan is continue to provide support.      Serena Whittington, RRT  8/2/2019 6:01 PM

## 2019-08-02 NOTE — CONSULTS
[facility]  Palliative Care Consultation Note    Patient: Atilio Szymanski  Date of Admission:  7/31/2019    Requesting Clinician / Team: Thuan Barrett MD  Reason for consult: Goals of care  Patient and family support    Recommendations:    Full code, continued restorative measures    Appreciate palliative care  involvement for patient/family support, can involve  if more needs     Will continue to follow 1-2 times per week for ongoing support      Thank you for the opportunity to participate in the care of this patient and family. Our team: will continue to follow.     During regular M-F work hours -- if you are not sure who specifically to contact -- please contact us by sending a text page to our team consult pager at [phone].    After regular work hours and on weekends/holidays, you can call our answering service at [phone]. Also, who's on call for us is available in [employer].       Assessments:  Atilio Szymanski is a 68 year old male who presents after a PEA arrest and is s/p stenting x7 and VA ECMO. Found to have severe three vessel disease.    Today, the patient was seen for:  PEA arrest  Cardiogenic shock  Severe three vessel disease    Prognosis, Goals, & Planning:      Functional Status just prior to hospitalization: 1 (Restricted in physically strenuous activity but ambulatory and able to carry out work of a light or sedentary nature)      Prognosis, Goals, and/or Advance Care Planning were addressed today: Yes        Summary/Comments: met with family, they have a good understanding of his current medical condition. Discussed with them his need for dialysis as a result for the injury they sustained from the PEA arrest. Discussed the goal to support him to allow for his organs to recover, and over the days we will be seeing how they possibly recover, discussed once he is more stable and able to be taken of medications that keep him  sleepy the goals to see how his brain is working. Discussed possibility he is confused as a result of delirium and that gets better if he gets better. Family was appreciative of the information. Per the family, he was having chest pains for weeks and the children kept trying to get him to go to the hospital. His daughter was driving him and worried that he was not doing well so she called 911 and the family says within a minute EMS was there. Family says that he is a strong, stubborn, and smart man and still can see those characteristics beyond the machines even today.     Family reports at baseline patient and spouse are distrustful of the medical system, patient would go in to be seen and end up with nothing wrong or not have the issue found. Patient would treat symptoms holistically and with herbs/remedies.       Patient's decision making preferences: unable to assess          Patient has decision-making capacity today for complex decisions: No            I have concerns about the patient/family's health literacy today: No           Patient has a completed Health Care Directive: No.       Code status: full code    Coping, Meaning, & Spirituality:   Mood, coping, and/or meaning in the context of serious illness were addressed today: Yes  Summary/Comments: family seem to be coping well given the situation, they feel that they are honoring the patient by taking care of themselves because that is what he would want.    Social:     Key family / caregivers: wife, three children, son in law, one grandchild    Occupational history: retired, struggled initally, but now keeps busy with projects, would make toys for the grandchild    History of Present Illness:      Atilio Szymanski is a 68 year old male who presents after a PEA arrest and is s/p stenting x7 and VA ECMO. Found to have severe three vessel disease. Family reports that daughter was driving patient and was more concerned and called 911. Family reports within a  minute EMS was there. He was taking to an OSH and then transferred here for ECMO.    Palliative care consulted 8/1 for goals of care and support    Key Palliative Symptom Data:  We are not helping to manage these symptoms currently in this patient.    Patient is on opioids: bowels not assessed today.    ROS:  Comprehensive ROS is reviewed and is negative except as here & per HPI:      Past Medical History:  No past medical history on file.     Past Surgical History:  No past surgical history on file.      Family History:  No family history on file.      Allergies:  Allergies   Allergen Reactions     Caffeine         Medications:  I have reviewed this patient's medication profile and medications from this hospitalization.     Physical Exam:  Vital Signs: Temp: 98.6  F (37  C) Temp src: Oral     Heart Rate: 66 Resp: 13 SpO2: 100 % O2 Device: Mechanical Ventilator    Weight: 211 lbs 10.27 oz    Constitutional: intubated and sedated, no apparent distress  Lungs: No increased work of breathing on venilator  Cardiovascular: on VA ECMO  Neurologic: patient is sedated.  Neuropsychiatric: EDMOND given AMS.    Data reviewed:  Recent lab data reviewed, my comments on pertinents:   Creatinine 1.75  GFR 39  Bilirubin 2.5  Sodium 144  Potassium 4.0  WBC 6.5  Hemoglobin 9.0  Platelets 87    MARISOL Crawford CNS  Palliative Care Consult Team  Pager: [phone]     Total time spent was 75 minutes,  >50% of time was spent counseling and/or coordination of care regarding goals of care.

## 2019-08-02 NOTE — PROGRESS NOTES
Cardiology - CSI service    Interval events: Slight contraction noted in all extremities overnight. Sinus bradycardia/sinus rhythm. Rare PVC. IABP 1:1, 100% augmented. Continues on ECMO. Given 1 pRBC, 2 plt, 2 FFP, 250 albumin. CT output elevated, 100-200 ml/hr, serosanguinous.  UOP decreasing overnight, <30, not increasing with fluid admin. Began rewarming at 0500. 0.25 degrees per hour to 36 C. Shocked 3x this am, amio and lidocaine bolus.     ACT range 146-175. Went down to cath lab today, got more balloon and stent to prox left main (was 90% stenosed).         Temp:  [93.7  F (34.3  C)-97.5  F (36.4  C)] 94.8  F (34.9  C)  Heart Rate:  [53-71] 64  Resp:  [10-27] 13  MAP:  [51 mmHg-280 mmHg] 69 mmHg  Arterial Line BP: ()/() 99/50  FiO2 (%):  [40 %-60 %] 40 %  SpO2:  [95 %-100 %] 100 %  IABP via LFA, 1:1, triggered by EKG, augmented MAPs 70, L radial and distal pulse in tact   ECMO settings:   RPM's 3350 with flow range 3.7-3.9 L/min. Sweep gas is at 4 LPM and FiO2 60%.    I/O last 3 completed shifts:  In: 28584.92 [I.V.:6764.92; NG/GT:190]  Out: 5839 [Urine:1753; Emesis/NG output:200; Chest Tube:3886]    Hemodynamic drips: Levophed 0.09   Selected medications: amio 1, fentanyl 100, versed 8.     Physical examination:  Vitals:    08/01/19 1200 08/02/19 0300   Weight: 95 kg (209 lb 7 oz) 96 kg (211 lb 10.3 oz)     GENERAL APPEARANCE: Intubated, sedated. NAD.  HEENT: No icterus, ETT in place, OG tube in place  CARDIOVASCULAR: regular rate and rhythm, normal S1 and S2, no S3 or S4 and no murmur, click or rub. Normal PMI. Pulses dopplerable.  RESP: Coarse bilaterally. Mechanical ventilation.    GASTRO: Soft, bowel sounds hypoactive but present  GENITOURINARY: Bailey in place.  EXTREMITIES: Cool, 1+ edema, pulses dopplered, as above. VA ECMO cannulas in right groin, ThermoGard  NEURO: Sedated and intubated. Fent and Versed for sedation, as below.  INTEGUMENTARY: No rashes. Cannula/Line sites  CDI  LINES/TUBES/DRAINS: (noted below) V-A ECMO Cannulas L groin, arterial sheath and ThermoGard in R groin. R radial Arterial line. ETT. OG. Bailey Catheter.    Labs were reviewed.    BMP  Recent Labs   Lab 08/02/19 0343 08/02/19 0334 08/01/19 2200 08/01/19 2156 08/01/19  1555 08/01/19  0942   NA  --  144 144  --  145* 143   POTASSIUM  --  3.9 3.7  --  3.5 3.5   CHLORIDE  --  111* 110*  --  112* 112*   JEANNE  --  7.8* 7.5*  --  7.2* 7.5*   CO2  --  22 23  --  24 22   BUN  --  24 22  --  18 17   CR  --  1.65* 1.37*  --  1.28* 1.16   * 130* 130* 138* 107*  108* 155*  159*     LFTs  Recent Labs   Lab 08/02/19 0334 08/01/19 2200 08/01/19  1555 08/01/19  0942   ALKPHOS 32* 31* 28* 33*   * 482* 550* 567*   * 106* 108* 113*   BILITOTAL 2.5* 2.2* 1.2 1.3   PROTTOTAL 4.8* 4.3* 3.8* 3.9*   ALBUMIN 3.0* 2.9* 2.4* 2.6*      CBC  Recent Labs   Lab 08/02/19 0334 08/01/19 2200 08/01/19 1555 08/01/19  0942   WBC 4.9 4.1 3.8* 3.4*   RBC 3.05* 2.80* 2.47* 2.71*   HGB 8.9* 8.1* 7.1* 7.8*   HCT 26.2* 24.1* 21.3* 23.0*   MCV 86 86 86 85   MCH 29.2 28.9 28.7 28.8   MCHC 34.0 33.6 33.3 33.9   RDW 14.2 14.2 14.1 14.3   PLT 77* 77* 93* 125*     INR  Recent Labs   Lab 08/02/19 0334 08/01/19 2200 08/01/19  1555 08/01/19  0942   INR 1.70* 1.77* 1.55* 1.34*     LA 4.6.       IMPRESSION & PLAN  Atilio Szymanski [[id number]] ([facility]) is a 68 year old male with a history of HTN, HLD who was admitted on 7/31/2019 for PEA arrest. Arrest at home, no bystander CPR. Transferred from [facility]. ~120 min before placed on ECMO. 3V disease involving culprit LM.     Neurology: Intubated, sedated, paralyzed. Cooled to 36 degrees.  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 3V disease. ORIANA to LM x1,  pLAD x1, pLCx x1, RCA x4.  Peripheral V-A ECMO inserted for refractory PEA. Approximately 120 min downtime. LA initially 9.0 on arrival.   TTE: complete ordered  --wean pressors/inotropes as able  --TTE - Near standstill heart with minimal cardiac motion and mitral valve without  closure during systole.  --continue ASA 81mg and ticagrelor 90mg BID  --hold temp at 36 given considerable bleeding  --ACT goal 180-200  --hold lipitor for now given likely hepatic injury during arrest  --hold ACE/ARB for now given likely reduced renal fxn after arrest  --holding beta blocker given shock   --went down to cath lab today, got more balloon and stent to prox left main (was 90% stenosed).       Pulmonary:  # Acute hypoxic/hypercarbic respiratory failure  # Pulmonary edema  # B/l Pleural effusion s/p b/l chest tube at OSH  # Possible diffuse alveolar hemorrhage Vent Settings:  ETT in stable position.  Now weaning vent requirements.  CXR: Lines in stable position. Pulmonary edema, b/l pleural effusions s/p b/l chest tube placement at OSH (not sterile).  --wean vent as able  --daily CXR  Chest CT  --Q2h ABGs for now       GI and Nutrition: No known medical hx.  --monitor BID LFTs  --NPO for now - nutrition consult pending feeding tube placement  --bowel regimen - on hold for now  --GI Prophylaxis: PPI       Renal, Fluid and Electrolytes: Cr unknown baseline. 1.25 on arrival.  --monitor urine output  --maintain K>3 and Mg>2       Infectious Disease:  # Presumed aspiration No signs of infection. Leukocytosis c/w arrest. Blood cultures collected.   --vancomycin/zosyn x5 days for ECMO  --daily blood cultures  --monitor for signs of infection given cooling, lines, and leukocytosis   Hematology and Oncology:  # Acute blood loss anemia Receiving heparin for ECMO and ASA/ticagrelor for ORIANA.   --cryo PRN fibrinogen < 200; FFP for INR >2  --Transfuse for Hgb<10  --holding heparin given bleeding, will allow heparin through distal reperfusion  cannula  - S/p massive transfusion protocol, s/p Factor VII  --US LE w/ arterial duplex per ECMO protocol   --DVT PPX: Heparin as above   Endocrinology: No known medical history. BG elevated.  --insulin gtt  --f/u HgbA1c    Lines: L femoral arterial and venous ECMO cannulae July 31, 2019  R femoral arterial line July 31, 2019  R radial arterial line July 31, 2019  ETT July 31, 2019  Bailey catheter July 31, 2019  OG tube July 31, 2019  Restraint: needed    Current lines are required for patient management       Family update by me today: Yes      Code Status:     The pt was discussed and evaluated with Dr. Solomon Ambrocio, attending physician, who agrees with the assessment and plan above.     Thank you for allowing me to care for this patient. This has been discussed with the attending physician.    La Ortega MD  Cardiology Fellow, PGY-5

## 2019-08-02 NOTE — PROGRESS NOTES
EEG CLINICAL NEUROPHYSIOLOGY PRELIMINARY REPORT    Video-EEG through 0600 today reviewed. Hypothermic. Midazolam 8 mg/hr, fentanyl 100 micrograms/hr. EEG continues severely suppressed. No clear electrocerebral activity for long periods of time, even at high sensitivities and with long interelectrode distances. No seizures.    Results continue consistent with severe diffuse cerebral dysfunction. Long periods in which no clear electrocerebral activity is detected even at high sensitivites. This may in part be related to anesthetic drips and hypothermia but severe underlying cerebral dysfunction is very likely. No seizures.     Will continue video-EEG monitoring. Full report to follow.    Kwaku Freed MD  Pager [phone]

## 2019-08-02 NOTE — PROGRESS NOTES
"[facility]E  Palliative Care Initial Spiritual Assessment    Patient: Atilio Szymanski  Date of Admission:  7/31/2019  Reason for consult: goals of care and patient/family coping      Summary and Recommendations:  Family of patient Atilio Szymanski are mutually supportive and supportive of Atilio.  Per family, Atilio finds meaning in being active, making things for family, and having a holistic approach to the world and to medical care.     Spiritual Health will follow.    These recommendations have been discussed with family and palliative care team.    Danna Ayoub  Palliative   Pager [phone]  [facility] Inpatient Team Consult pager [phone] (M-F 8-4:30)  After-hours Answering Service [phone]      Assessments:   Visit with patient Atilio Szymanski's daughter Leila, son Atilio Gonzalez, and son-in-law.    Distress:  Distress in the context of serious illness was assessed today:    Existential/spiritual/emotional distress: Yes; while overall coping well, family concerned about Kims condition. They continue to see who he is (stubborn, smart, passionate, and determined) even in the ICU.      Hinduism distress:  No.      Social/economic/relational distress:  No; Atilio initially struggled with retiring and being less busy, but he has found meaning in crafting and making things for others (mainly woodworking).    Coping, Meaning, & Spirituality:   Coping, meaning, and/or spirituality in the context of serious illness were assessed today:    Spiritual background and preferences: personal spirituality with Episcopalian roots and a \"holistic\" sensibility.      Beliefs, rituals, and practices: connection to family, taking care of himself (and wife) by taking herbal remedies.      Meaning-making: found in family relationships and caring for others      Strengths and resources: family, personal resilience    Prognosis, Goals, & Planning:     Prognosis, Goals, and/or " Advance Care Planning were assessed today: Yes - family have a good understanding of his medical condition, are appropriately concerned about prognosis, hopeful his personality will help carry him through.      Preferred language: English (wife prefers Czech at times)      Patient's decision making preferences: unable to assess      I have concerns about the patient/family's health literacy today: No      Patient has a completed Health Care Directive: No.       Code status per chart review: full code    Key Palliative Symptom Data:  We are not helping to manage these symptoms currently in this patient.      Interventions:  I offered emotional and spiritual support through affirmation of emotions, meaning-making, and experience and assistance in goals of care discussion.

## 2019-08-02 NOTE — PROGRESS NOTES
"CRRT STATUS NOTE    DATA:  Time:  5:26 PM  Pressures WNL:  YES  Filter Status:  WDL    Problems Reported/Alarms Noted:  None    Supplies Present:  YES    ASSESSMENT:  Patient Net Fluid Balance:  +3200 since midnight, +12L since midnight.  Vital Signs: Temp 98.8  F (37.1  C) (Oral)   Resp 13   Ht 1.803 m (5' 11\")   Wt 96 kg (211 lb 10.3 oz)   SpO2 99%   BMI 29.52 kg/m    Labs: Creatinine 1.84  Goals of Therapy: intake=output    INTERVENTIONS:   Initiate CRRT.    PLAN:  Continue with plan of care. Contact resource RN with any questions/concerns at [phone].     "

## 2019-08-02 NOTE — PROCEDURES
Small Bowel Feeding Tube Placement Assessment  Reason for Feeding Tube Placement: Team request for post-pyloric FT   Cortrak Start Time: 1330   Cortrak End Time: 1400   Medicine Delivered During Procedure: Lidocaine gel  Placement Successful: Presume FT tip @ pylorus per delmarak read (awaiting AXR confirmation).     Procedure Complications: Pt bleeding from nose during procedure   Final Placement [place] at exit of nare 93 cm  Face to Face time with patient: 30 min         Niki Fitch RD, LD  Pager: [phone]

## 2019-08-02 NOTE — PROCEDURES
Bridle Placement:   Reason for bridle placement: FT securement    Medicine delivered during procedure: lidocaine gel   Procedure: Successful   Location of top of clip on FT: @ 94 cm marker   Condition of nose/skin at time of bridle placement: Unremarkable   Face to Face time with patient: < 5 minutes.    Niki Fitch RD, LD  Pager: [phone]

## 2019-08-03 NOTE — PROGRESS NOTES
Cardiology Critical Care Note - Cardiology  Warren Nicole M.D.  The patient remains unstable in the ICU with on-going need for ventilator support, parenteral medications for the adjustment of blood pressure and cardiac output and maintenance of renal function.      The patient is seen for prolonged ventilator management requiring oxygen and/or pressure modulation; shock requiring vasopressor and or inotropic agents; low cardiac output necessitating inotropic agents, vasopressors and afterload reducing agents; VA ECMO; acute renal failure requiring fluid and diuretic management; sepsis requiring antibiotic selection and monitoring, vasopressors, fluid management to maintain blood pressure and secondary organ function    I personally reviewed:  Arterial and venous blood gases to assess acid base balance, oxygenation, and ventilator settings.    Hemodynamic parameters were assessed such as  RAP, estimated LVEDP cardiac output and vascular resistances in order to adjust fluids and infused medications for blood pressure and cardiac output maintenance.  Ventilatory settings and/or supplement oxygen needs in order to obtain optimal oxygenation and electrolyte balance at low possible pressure support and inspired oxygen tension      ECMO Attending Progress Note  8/3/2019    Atilio Szymanski is a 68 year old male who was cannulated for ECMO LM thrombosis due to chronic CAD and RCA disease.  Patient had symptoms of advanced heart failure prior to his arrest and his baseline LV function is unknown.    Interval events: stented the ostial LM yesterday and rewarmed. His pressure requirements have increased and he has shock liver that explains his lactate levels. He is on CCRT    Physical Exam:  Temp:  [97.7  F (36.5  C)-99.9  F (37.7  C)] 99.9  F (37.7  C)  Heart Rate:  [54-68] 60  Resp:  [13] 13  MAP:  [52 mmHg-84 mmHg] 60 mmHg  Arterial Line BP: ()/(15-56) 91/46  FiO2 (%):  [40 %] 40 %  SpO2:  [95 %-100 %] 97  %    Intake/Output Summary (Last 24 hours) at 8/3/2019 1012  Last data filed at 8/3/2019 0900  Gross per 24 hour   Intake 5055.01 ml   Output 1824 ml   Net 3231.01 ml    Ventilation Mode: APRV  (Airway Pressure Release Ventilation)  FiO2 (%): 40 %  Oxygen Concentration (%): 40 %  Resp: 13     General: no acute distress, intubated and sedated  HEENT: PERRLA, conjunctiva clear, without icterus or pallor, oropharyx clear  Cardiac: RRR nl S1S2   Lungs: clear to auscultation and percussion bilaterally anterior  Abdomen: soft, nontender, non distended, no hepatosplenomegaly or masses  Extremities: without edema or cyanosis; pulses are dopplerable;   Skin: normal skin appearance without worrisome lesions.   Neurologic:intubated and sedated,     Labs:  Recent Labs   Lab 08/03/19  0932 08/03/19  0810 08/03/19  0546 08/03/19  0333   PH 7.28* 7.27* 7.32* 7.35   PCO2 41 40 37 36   PO2 86 88 108* 136*   HCO3 19* 18* 19* 20*   O2PER 40 40 40.0 40.0      Recent Labs   Lab 08/03/19  0344 08/02/19  2150 08/02/19  1615 08/02/19  1339   WBC 7.6 6.8 6.7 6.5   HGB 8.5* 8.7* 8.9* 9.0*     Creatinine   Date Value Ref Range Status   08/03/2019 1.89 (H) 0.66 - 1.25 mg/dL Final   08/02/2019 1.91 (H) 0.66 - 1.25 mg/dL Final   08/02/2019 1.84 (H) 0.66 - 1.25 mg/dL Final   08/02/2019 1.75 (H) 0.66 - 1.25 mg/dL Final       ECMO Issues including assessments and plan on DOS 8/3/2019:  Neuro: Sedated for mechanical ventilation and ECMO.  NIRS stable 60s b/l   CV: Cardiogenic shock.  Hemodynamically stable on vaso 4 , levo 0.2 phenylephrine 0.1    Pulm: Flows unchanged at 3.8, Sweep unchanged at 5, Keep vent settings at rest settings   FEN/Renal: Electrolytes stable w/ replacement protocols in place, 1.9 Cr stable, UOP stable  Heme: ACT goal: 190, Hemoglobin 8.5 .  Goals: if O2 sat >85% Hgb >8.  If O2 Sat <85% keep Hgb 10-12.  Minimal oozing around the ECMO cannulas.  ID: Receiving empiric antibiotics  Cannulae: Position is acceptable on exam and  the available imaging.  Distal perfusion cannula is in place and patent.     I have personally reviewed the ECMO flows, oxygenation and CO2 clearance, anticoagulation, and cannula position.  I have also personally assessed the patient's systemic response with hemodynamics, oxygenation, ventilation, and bleeding.       The patient requires continued ECMO support and management in the ICU.        I have seen and examined the patient with the CSI team. I agree with the assessment and plan of the note above.I have reviewed pertinent labs.     Warren Nicole MD  Interventional Cardiology  Pager: [phone]

## 2019-08-03 NOTE — PROGRESS NOTES
ECMO Shift Summary:    Patient remains on VA ECMO, all equipment is functioning and alarms are appropriately set. RPM's 4232 with flow range 4.88 L/min. Sweep gas is at 6 LPM and FiO2 60%. Circuit remains free of air, clot and fibrin. Cannulas are secure with no bleeding from site. Extremities are warm and dusky. Suctioned ETT for thick secretions.    Significant Shift Events: Pt has two incidences of loss of blood pressure requiring significant increase in pressors and fluid boluses.    Vent settings:  Ventilation Mode: APRV  (Airway Pressure Release Ventilation)  FiO2 (%): 40 %  Oxygen Concentration (%): 40 %  Resp: 13  .    Heparin is running at 500 u/hr, ACT range 191-199.    Urine output is as charted, blood loss was minimal oozing. Product given included 2 unit(s) RBCS and 250 of albumin, 360 of NS.      Intake/Output Summary (Last 24 hours) at 8/3/2019 1422  Last data filed at 8/3/2019 1417  Gross per 24 hour   Intake 5018.69 ml   Output 2081 ml   Net 2937.69 ml       ECHO:  No results found for this or any previous visit.No results found for this or any previous visit.    CXR:  Recent Results (from the past 24 hour(s))   XR Abdomen Port 1 View    Narrative    Examination:  XR ABDOMEN PORT 1 VW 8/2/2019 2:48 PM     Comparison: None.    History: feeding tube    Findings: The NG/NG tube loops within the stomach with the tip in  sidehole projecting over the stomach. The feeding tube tip projects  over the third portion of the duodenum. A lower approach central  venous catheter with the tip projecting at the inferior cavoatrial  junction. ECMO catheter cannula projects over the right atrium. IABP  balloon marker projects at the level of the andrey. Minimal amount of  urinary excretion is seen bilaterally. No dilated loops of bowel,  pneumatosis, or portal venous gas. Coarse bilateral pulmonary  opacities.      Impression    Impression: The feeding tube tip projects over the third portion of  the duodenum.     I  have personally reviewed the examination and initial interpretation  and I agree with the findings.    SUJIT BOOTH MD   XR Chest Port 1 View    Narrative    Exam: TEMPORARY, 8/3/2019 3:27 AM    Indication: Endotracheal tube and ECMO cannulae    Comparison: Chest x-ray 08/02/2019    Findings:   AP single view of the chest. Tip of the endotracheal tube projects  over the midthoracic trachea. Radiopaque marker of the intra-aortic  balloon pump projects 1.8 cm below the andrey. Inferior approach ECMO  cannula, central venous catheter, bilateral chest tubes, enteric  gastric and feeding tubes are in stable position. Mild progression of  mixed interstitial and airspace opacities in bilateral lungs. No  significant pneumothorax. Left costophrenic angle is collimated  outside the field-of-view.      Impression    Impression:   1. Mild worsening of mixed interstitial airspace opacity in bilateral  lungs, diffuse infection versus ARDS versus pulmonary edema.  2. Bilateral chest tubes in place without significant pneumothorax.  3. Radiopaque marker of the intra-aortic balloon pump projects 1.8 cm  below the andrey. 4. Lines and tubes as above.    I have personally reviewed the examination and initial interpretation  and I agree with the findings.    JERRY REDD MD       Labs:  Recent Labs   Lab 08/03/19  1350 08/03/19  1211 08/03/19  0932 08/03/19  0810   PH 7.24* 7.27* 7.28* 7.27*   PCO2 50* 42 41 40   PO2 71* 85 86 88   HCO3 21 19* 19* 18*   O2PER 40 40 40 40       Lab Results   Component Value Date    HGB 8.5 (L) 08/03/2019    PHGB <30 08/03/2019    PLT 88 (L) 08/03/2019    FIBR 338 08/03/2019    INR 3.68 (H) 08/03/2019    PTT 79 (H) 08/03/2019    DD >20.0 (H) 08/03/2019    AXA <0.10 08/03/2019    ANTCH 50 (L) 08/03/2019         Plan is continue to provide support.      Serena Whittington, RRT  8/3/2019 2:22 PM

## 2019-08-03 NOTE — PLAN OF CARE
D;Pt remains unresponsive even with ETT suctioning no gags or coughing reflex.Small amount bloody thin secretions.See ABG's.Lactic going up as well as LFT's.CRRT almost made I=O till 0500.Return pressue and excess alarm frequently.CRRT nurse try to restarted but not able to precede via ECMO circuit.HR dropped to 40's- 50's appears 1st degree HB.Stopped amiodarone gtt as well as fentanyl gtt.UO 5-15cc/hr.Levo gtt to 0.18mcq instead of Vaso to 3u from 2u/hr.BP tolerated fine till CRRT problem occurred.ECMO circuit site small amount bleeding.No TF or heparine gtt started.1u PRBC for Hgb 8.1 on monitor.  I;A;Continue to have fluids imbalnces.CRRT not functioning due to ECMO circuit.  P;Continue to work on dialysis access as soon as possible.Continue ECMO cares.

## 2019-08-03 NOTE — PROGRESS NOTES
ECMO Shift Summary:    Patient remains on V/A ECMO, all equipment is functioning and alarms are appropriately set. RPM's 3350 with flow range 3.58-3.83 L/min. Sweep gas is at 5 LPM and FiO2 60%. Circuit remains free of air, clot and fibrin. Cannulas are secure with no bleeding from site. Extremities are warm. Suctioned ETT for thick nishant secretions.    Significant Shift Events: CRRT off at 0600 to change circuit. Will re-connect when new circuit is prepped.    Vent settings:  Ventilation Mode: APRV  (Airway Pressure Release Ventilation)  FiO2 (%): 40 %  Oxygen Concentration (%): 40 %  Resp: 13  .    Heparin is off, ACT range 159-160.    Urine output is good, blood loss was minimal. Product given included 1 PRBC.      Intake/Output Summary (Last 24 hours) at 8/3/2019 0600  Last data filed at 8/3/2019 0400  Gross per 24 hour   Intake 4740.15 ml   Output 2072 ml   Net 2668.15 ml       ECHO:  No results found for this or any previous visit.No results found for this or any previous visit.    CXR:  Recent Results (from the past 24 hour(s))   XR Abdomen Port 1 View    Narrative    Examination:  XR ABDOMEN PORT 1 VW 8/2/2019 2:48 PM     Comparison: None.    History: feeding tube    Findings: The NG/NG tube loops within the stomach with the tip in  sidehole projecting over the stomach. The feeding tube tip projects  over the third portion of the duodenum. A lower approach central  venous catheter with the tip projecting at the inferior cavoatrial  junction. ECMO catheter cannula projects over the right atrium. IABP  balloon marker projects at the level of the andrey. Minimal amount of  urinary excretion is seen bilaterally. No dilated loops of bowel,  pneumatosis, or portal venous gas. Coarse bilateral pulmonary  opacities.      Impression    Impression: The feeding tube tip projects over the third portion of  the duodenum.     I have personally reviewed the examination and initial interpretation  and I agree with the  findings.    SUJIT BOOTH MD       Labs:  Recent Labs   Lab 08/03/19  0546 08/03/19  0333 08/03/19  0155 08/02/19  2356   PH 7.32* 7.35 7.34* 7.36   PCO2 37 36 36 35   PO2 108* 136* 133* 128*   HCO3 19* 20* 19* 20*   O2PER 40.0 40.0 40.0 40.0       Lab Results   Component Value Date    HGB 8.5 (L) 08/03/2019    PHGB <30 08/03/2019     (L) 08/03/2019    FIBR 338 08/03/2019    INR 2.97 (H) 08/03/2019    PTT 56 (H) 08/03/2019    DD >20.0 (H) 08/03/2019    AXA <0.10 08/03/2019    ANTCH 54 (L) 08/02/2019         Plan is to remain stable on  V/A ECMO.      Amber Esquivel, RRT  8/3/2019 6:00 AM

## 2019-08-03 NOTE — PROGRESS NOTES
Cardiology - CSI service    Interval events: Slight contraction noted in all extremities overnight. Sinus bradycardia/sinus rhythm. Rare PVC. IABP 1:1, 100% augmented. Continues on ECMO. Chest tube output slowing, LFTs bumping. Began rewarming at 0500. 0.25 degrees per hour to 36 C. Shocked 3x this am, amio and lidocaine bolus. Issues with dialysis circuit.  Went down to cath lab yesterday, got more balloon and stent to prox left main (was 90% stenosed).     Going up on ACT goal to 160-180.         Temp:  [94.8  F (34.9  C)-99.3  F (37.4  C)] 99.1  F (37.3  C)  Heart Rate:  [54-75] 54  Resp:  [13] 13  MAP:  [53 mmHg-84 mmHg] 60 mmHg  Arterial Line BP: ()/(15-56) 93/44  FiO2 (%):  [40 %] 40 %  SpO2:  [95 %-100 %] 97 %  IABP via LFA, 1:1, triggered by EKG, augmented MAPs 70, L radial and distal pulse in tact   ECMO settings:   RPM's 3350 with flow range 3.58-3.83 L/min. Sweep gas is at 5 LPM and FiO2 60%.    I/O last 3 completed shifts:  In: 5551.73 [I.V.:1947.73; Other:10; NG/GT:200]  Out: 2523 [Urine:348; Emesis/NG output:500; Other:85; Chest Tube:1590]   +~02265lb since admission    Hemodynamic drips: Levophed 0.19, vaso 3,    Selected medications: amio 1, fentanyl 50, versed 2.    Physical examination:  Vitals:    08/01/19 1200 08/02/19 0300   Weight: 95 kg (209 lb 7 oz) 96 kg (211 lb 10.3 oz)     GENERAL APPEARANCE: Intubated, sedated. NAD.  HEENT: No icterus, ETT in place, OG tube in place  CARDIOVASCULAR: regular rate and rhythm, normal S1 and S2, no S3 or S4 and no murmur, click or rub. Normal PMI. Pulses dopplerable.  RESP: Coarse bilaterally. Mechanical ventilation.    GASTRO: Soft, bowel sounds hypoactive but present  GENITOURINARY: Bailey in place.  EXTREMITIES: Cool, 1+ edema, pulses dopplered, as above. VA ECMO cannulas in right groin, ThermoGard  NEURO: Sedated and intubated. Fent and Versed for sedation, as below.  INTEGUMENTARY: No rashes. Cannula/Line sites CDI  LINES/TUBES/DRAINS: (noted below)  V-A ECMO Cannulas L groin, arterial sheath and ThermoGard in R groin. R radial Arterial line. ETT. OG. Bailey Catheter.    Labs were reviewed.    BMP  Recent Labs   Lab 08/03/19 0344 08/03/19  0333 08/02/19  2150 08/02/19  1615 08/02/19  0720     --  142 142 144   POTASSIUM 4.6  --  4.6 4.3 4.0   CHLORIDE 110*  --  108 109 110*   JEANNE 7.6*  --  7.9* 7.2* 7.8*   CO2 20  --  21 23 21   BUN 26  --  24 27 26   CR 1.89*  --  1.91* 1.84* 1.75*   GLC 81 86 81  82 111*  112* 131*  129*     LFTs  Recent Labs   Lab 08/03/19 0344 08/02/19 2150 08/02/19 1615 08/02/19  0720   ALKPHOS 51 48 53 34*   AST 1,281* 522* 387* 410*   ALT 1,139* 360* 159* 103*   BILITOTAL 3.9* 3.4* 2.9* 2.5*   PROTTOTAL 5.0* 5.1* 4.8* 4.7*   ALBUMIN 3.0* 3.2* 2.8* 2.9*      CBC  Recent Labs   Lab 08/03/19 0344 08/02/19 2150 08/02/19 1615 08/02/19  1339   WBC 7.6 6.8 6.7 6.5   RBC 2.87* 2.92* 3.04* 3.06*   HGB 8.5* 8.7* 8.9* 9.0*   HCT 25.8* 25.6* 26.9* 27.2*   MCV 90 88 89 89   MCH 29.6 29.8 29.3 29.4   MCHC 32.9 34.0 33.1 33.1   RDW 15.2* 15.0 14.6 14.7   * 136* 136* 87*     INR  Recent Labs   Lab 08/03/19 0344 08/02/19 2150 08/02/19 1615 08/02/19  0720   INR 2.97* 2.46* 2.02* 1.83*     LA 4.6.       IMPRESSION & PLAN  Atilio Szymanski [[id number]] ([place]) is a 68 year old male with a history of HTN, HLD who was admitted on 7/31/2019 for PEA arrest. Arrest at home, no bystander CPR. Transferred from [facility]. ~120 min before placed on ECMO. 3V disease involving culprit LM.     Neurology: Intubated, sedated, paralyzed. Cooled to 36 degrees.  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 3V disease. ORIANA to LM x1, pLAD x1, pLCx x1, RCA x4.  Peripheral V-A ECMO inserted for refractory PEA. Approximately  120 min downtime. LA initially 9.0 on arrival.   TTE: complete ordered  --wean pressors/inotropes as able  --TTE - Near standstill heart with minimal cardiac motion and mitral valve without  closure during systole.  --continue ASA 81mg and ticagrelor 90mg BID  --hold temp at 36 given considerable bleeding  --ACT goal 180-200  --hold lipitor for now given likely hepatic injury during arrest  --hold ACE/ARB for now given likely reduced renal fxn after arrest  --holding beta blocker given shock   --went down to cath lab today, got more balloon and stent to prox left main (was 90% stenosed).       Pulmonary:  # Acute hypoxic/hypercarbic respiratory failure  # Pulmonary edema  # B/l Pleural effusion s/p b/l chest tube at OSH  # Possible diffuse alveolar hemorrhage Vent Settings:  ETT in stable position.  Now weaning vent requirements.  CXR: Lines in stable position. Pulmonary edema, b/l pleural effusions s/p b/l chest tube placement at OSH (not sterile).  --wean vent as able  --daily CXR  Chest CT  --Q2h ABGs for now       GI and Nutrition: No known medical hx.  --monitor BID LFTs  --NPO for now - nutrition consult pending feeding tube placement  --bowel regimen - on hold for now  --GI Prophylaxis: PPI       Renal, Fluid and Electrolytes: Cr unknown baseline. 1.25 on arrival.  --monitor urine output  --maintain K>3 and Mg>2       Infectious Disease:  # Presumed aspiration No signs of infection. Leukocytosis c/w arrest. Blood cultures collected.   --vancomycin/zosyn x5 days for ECMO  --daily blood cultures  --monitor for signs of infection given cooling, lines, and leukocytosis   Hematology and Oncology:  # Acute blood loss anemia Receiving heparin for ECMO and ASA/ticagrelor for ORIANA.   --cryo PRN fibrinogen < 200; FFP for INR >2  --Transfuse for Hgb<10  --holding heparin given bleeding, will allow heparin through distal reperfusion cannula  - S/p massive transfusion protocol, s/p Factor VII  --US LE w/ arterial duplex  per ECMO protocol   --DVT PPX: Heparin as above   Endocrinology: No known medical history. BG elevated.  --insulin gtt  --f/u HgbA1c    Lines: L femoral arterial and venous ECMO cannulae July 31, 2019  R femoral arterial line July 31, 2019  R radial arterial line July 31, 2019  ETT July 31, 2019  Bailey catheter July 31, 2019  OG tube July 31, 2019  Restraint: needed    Current lines are required for patient management       Family update by me today: Yes      Code Status:     The pt was discussed and evaluated with Dr. Solomon Ambrocio, attending physician, who agrees with the assessment and plan above.     Thank you for allowing me to care for this patient. This has been discussed with the attending physician.    La Ortega MD  Cardiology Fellow, PGY-5    I have seen and examined the patient with the CSI team. I agree with the assessment and plan of the note above.I have reviewed pertinent labs.     Warren Nicole MD  Interventional Cardiology  Pager: [phone]

## 2019-08-03 NOTE — PROGRESS NOTES
"CRRT STATUS NOTE    DATA:  Time:  6:07 PM  Pressures WNL:  YES  Filter Status: Clotting    Problems Reported/Alarms Noted:  Access pressure and return pressure alarms    Supplies Present:  YES    ASSESSMENT:  Patient Net Fluid Balance:  +2100 since midnight, +14L since admission.  Vital Signs:  Temp 99.3  F (37.4  C)   Resp 13   Ht 1.803 m (5' 11\")   Wt 97.1 kg (214 lb 1.1 oz)   SpO2 98%   BMI 29.86 kg/m    Labs:  K 4.9, creatinine 2.3, pH 2.31, bicarb 20, platelet 66  Goals of Therapy:  Input=output    INTERVENTIONS:   Restarted 0902 and 1410 today.     PLAN:  Continue plan of care. Contact resource RN with any question or concerns at [phone].    "

## 2019-08-03 NOTE — PROGRESS NOTES
EEG CLINICAL NEUROPHYSIOLOGY PRELIMINARY REPORT    Video-EEG through 0600 today reviewed. Normothermic. Fentanyl 50 micrograms/hr, midazolam 2 to 6 mg/hour. Severely suppressed with no clear electrocerebral activity for long periods of time, even at high sensitivities. NO reactivity. No seizures.    Severely abnormal with no clear electrocerebral activity for long periods of time. No improvement following rewarming. Findings could in part be related to sedative drips but doses employed are relatively low and severe underlying dysfunction separate from anesthetic effects is likely.No seizures. Full report to follow.    Kwaku Freed MD  Pager [phone]

## 2019-08-03 NOTE — PROCEDURES
EEG #-2       DATE OF RECORDING/SERVICE DATE:  08/02/2019       SOURCE FILE DURATION:  23 hours 46 minutes 30 seconds.       TYPE OF STUDY:  Inpatient video EEG monitoring.      HISTORY:  Day 2 of video-EEG monitoring in Atilio Szymanski a 68-year-old status post cardiac arrest.  He is intubated and comatose in ICU, undergoing hypothermia protocol.  Temperature during this study ranged between 94.6 degrees at study onset and 99 degrees at study end.  The patient is being treated with fentanyl drip 100 mcg per hour and midazolam 6-8 mg per hour.      TECHNICAL SUMMARY:  EEG is recorded from scalp electrodes placed according to the 10-20 international system.  Additional electrodes are utilized for artifact for referencing, artifact detection, and recording from other cerebral regions.  Video was continuously recorded.  Video was reviewed for clinical correlation and to assist with EEG interpretation.      FINDINGS:  Severe suppression of electrocerebral activity.  There are long stretches of EEG in which there is no clear electrocerebral activity, even at high sensitivities (2 microvolts per mm) and long interelectrode distance.  Ballistocardiographic artifact and respirator artifact is noted.  There are occasional artifactual appearing changes for which a clear source cannot be identified, but these are rare.      The patient undergoes ventricular fibrillation twice, at around 07:15 and 07:21 a.m.  He undergoes cardioversion and a cardiac rhythm returns.  EEG does not show any changes prior to or following the cardioversion except for the artifact related to the cardioversion.  Stimulation is performed utilizing ICU protocol around 09:15 and no clear changes are noted.      ICTAL:  No electrographic seizures.      IMPRESSION:  Severely abnormal.  No clear electrocerebral activity for long periods of the recording, even at high sensitivities.  There is no improvement following rewarming.  These findings could  potentially in part be related to the anesthetic drips employed; however, severe underlying cerebral dysfunction, separate from anesthetic effect, is likely.  Seizures not recorded.         KAYLEEN CORTEZ MD             D: 2019   T: 2019   MT: ANAHI      Name:     VARSHA NEAL   MRN:      [id number]        Account:        [id number]   :      1950           Procedure Date: 2019      Document: [id number]

## 2019-08-03 NOTE — PROCEDURES
EEG #-1       DATE OF RECORDING/SERVICE DATE:  08/01/2019       SOURCE FILE DURATION:  13 hours 53 minutes 16 seconds.      TYPE OF STUDY:  Inpatient video EEG monitoring.       HISTORY:  Day #1 of video-EEG monitoring in Atilio Szymanski a 68-year-old who is status post cardiac arrest.  He is intubated and comatose in ICU.  He is undergoing hypothermia protocol with a temperature at around 94 degrees Fahrenheit throughout the study.  He is being treated with fentanyl drip between 50 and 75 mcg per hour and midazolam 6 mg per hour.      TECHNICAL SUMMARY:  EEG is recorded from scalp electrodes placed according to the 10-20 international system.  Additional electrodes were utilized for referencing, artifact detection, and recording from other cerebral regions.  Video was continuously recorded.  Video was reviewed for clinical correlation and to assist with EEG interpretation.      FINDINGS:  At onset of recording, clear electrocerebral activity is present with 5 to 10 microvolt alpha and some theta seen in the central regions.  This activity was at times continuous, at other times interrupted by desynchronizations.  Stimulation was performed utilizing ICU protocol around 10:15 a.m. and there was no EEG reactivity.  Starting around 2:30 p.m. there was progressive attenuation of this very attenuated electrocerebral activity and by around 4:00 p.m. no clear electrocerebral activity was present for long stretches of the record, even at 2 microvolts per mm and with long interelectrode distances.  EEG remained in this severely attenuated state for the remainder of the study.      ICTAL:  No electrographic seizures at any point.      IMPRESSION:  Severely abnormal.  There is evidence of a severe diffuse encephalopathy at study onset but clear electrocerebral activity is present.  In mid-afternoon, there is progressive attenuation of the already severely attenuated electrocerebral activity so that starting at around  4:00 p.m., no clear electrocerebral activity is present for long stretches of the recording.  The minor changes in midazolam and fentanyl during this period of time are not sufficient to explain these changes.  Seizures were not seen at any point.      Results were discussed with managing staff following the progressive attenuation of electrocerebral activity observed in mid-afternoon.         KAYLEEN CORTEZ MD             D: 2019   T: 2019   MT: GURU      Name:     VARSHA NEAL   MRN:      [id number]        Account:        [id number]   :      1950           Procedure Date: 2019      Document: [id number]

## 2019-08-03 NOTE — PROGRESS NOTES
CRRT STATUS NOTE    DATA:  Time:  4:50 AM  Pressures WNL:  YES  Filter Status:  WDL    Problems Reported/Alarms Noted:  none    Supplies Present:  YES    ASSESSMENT:  Patient Net Fluid Balance:  +3124cc yesterday (pt received 2644cc in blood components), +~40910rp since admission  Vital Signs:  T 99.1, HR 60's, MAP>60  Labs:  Cr 1.89, K 4.6, Hgb 8.5, WBC 7.6  Goals of Therapy:  intake=output    INTERVENTIONS:   None.    PLAN:  Continue with plan of care. Monitor closely. Call CRRT resource RN with questions and concerns @ #[phone].

## 2019-08-04 NOTE — PROGRESS NOTES
Cardiology - CSI service    Interval events: IABP adjusted overnight. No products. Coming down on pressors. Head CT: Diffuse anoxic brain injury with infarct and associated edema. No herniation. NIRS in 60s, 65-68. No products overnight.         Temp:  [97.3  F (36.3  C)-99.9  F (37.7  C)] 97.3  F (36.3  C)  Heart Rate:  [48-73] 73  Resp:  [13] 13  MAP:  [43 mmHg-96 mmHg] 83 mmHg  Arterial Line BP: ()/(26-67) 141/59  FiO2 (%):  [40 %] 40 %  SpO2:  [90 %-100 %] 100 %  IABP via LFA, 1:1, triggered by EKG, augmented MAPs 70, L radial and distal pulse in tact   ECMO settings:   RPM's 4200 with flow range 4.43-4.65 L/min. Sweep gas is at 7 LPM and FiO2 60%    I/O last 3 completed shifts:  In: 4613.24 [I.V.:3098.24; NG/GT:170]  Out: 2738 [Urine:92; Emesis/NG output:150; Other:1766; Chest Tube:730]   +~82048ag since admission    Hemodynamic drips: Levophed 0.05, vaso 2, phenylephrine 0.2.   Selected medications: versed 2, heparin (ACT goal 160-180).     Physical examination:  Vitals:    08/01/19 1200 08/02/19 0300 08/03/19 0515 08/04/19 0445   Weight: 95 kg (209 lb 7 oz) 96 kg (211 lb 10.3 oz) 97.1 kg (214 lb 1.1 oz) 97.5 kg (214 lb 15.2 oz)     GENERAL APPEARANCE: Intubated, sedated. NAD.  HEENT: No icterus, ETT in place, OG tube in place  CARDIOVASCULAR: regular rate and rhythm, normal S1 and S2, no S3 or S4 and no murmur, click or rub. Normal PMI. Pulses dopplerable.  RESP: Coarse bilaterally. Mechanical ventilation.    GASTRO: Soft, bowel sounds hypoactive but present  GENITOURINARY: Bailey in place.  EXTREMITIES: Cool, 1+ edema, pulses dopplered, as above. VA ECMO cannulas in right groin, ThermoGard  NEURO: Sedated and intubated. Fent and Versed for sedation, as below.  INTEGUMENTARY: No rashes. Cannula/Line sites CDI  LINES/TUBES/DRAINS: (noted below) V-A ECMO Cannulas L groin, arterial sheath and ThermoGard in R groin. R radial Arterial line. ETT. OG. Bailey Catheter.    Labs were reviewed.    BMP  Recent Labs    Lab 08/04/19 0339 08/03/19 2154 08/03/19 1549 08/03/19 1546 08/03/19  0932    142  --  144 142   POTASSIUM 4.5 4.5  --  4.9 4.8   CHLORIDE 110* 109  --  110* 109   JEANNE 7.8* 8.0*  --  7.7* 7.2*   CO2 19* 19*  --  22 20   BUN 32* 31*  --  32* 29   CR 2.31* 2.38*  --  2.30* 2.25*   *  121* 102*  104* 108* 104* 96  101*     LFTs  Recent Labs   Lab 08/04/19 0339 08/03/19 2154 08/03/19 1546 08/03/19  0932   ALKPHOS 63 60 57 49   AST 4,373* 4,276* 3,102* 1,990*   ALT 2,820* 2,790* 2,156* 1,402*   BILITOTAL 6.7* 6.5* 5.6* 4.4*   PROTTOTAL 4.7* 4.9* 4.9* 4.6*   ALBUMIN 2.7* 2.9* 2.9* 2.9*      CBC  Recent Labs   Lab 08/04/19 0339 08/03/19 2154 08/03/19 1546 08/03/19  0932   WBC 11.3* 9.2 8.9 8.6   RBC 3.19* 3.32* 3.43* 2.91*   HGB 9.4* 9.8* 10.0* 8.5*   HCT 28.8* 29.6* 30.6* 26.0*   MCV 90 89 89 89   MCH 29.5 29.5 29.2 29.2   MCHC 32.6 33.1 32.7 32.7   RDW 15.4* 15.3* 15.3* 15.4*   PLT 58* 61* 66* 88*     INR  Recent Labs   Lab 08/04/19 0339 08/03/19 2154 08/03/19 1546 08/03/19  0932   INR 3.00* 3.22* 3.32* 3.68*     LA 4.6.       IMPRESSION & PLAN  Atilio Szymanski [[id number]] (Anonymous [facility]) is a 68 year old male with a history of HTN, HLD who was admitted on 7/31/2019 for PEA arrest. Arrest at home, no bystander CPR. Transferred from [facility]. ~120 min before placed on ECMO. 3V disease involving culprit LM.     Neurology: Intubated, sedated, paralyzed. Cooled to 36 degrees.  --continue versed, fentanyl, and cis as needed to maintain paralysis   - RASS goal -4 to -5. CT head worsened on day 3 but no herniation.    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 3V disease. ORIANA to LM x1, pLAD x1, pLCx x1, RCA x4.  Peripheral V-A ECMO inserted for refractory PEA. Approximately 120 min downtime. LA initially 9.0 on arrival.   TTE: complete ordered  --wean pressors/inotropes as able  --TTE  - Near standstill heart with minimal cardiac motion and mitral valve without closure during systole. Will attempt turndown at some point, likely not today.   --continue ASA 81mg and ticagrelor 90mg BID  --hold temp at 36 given considerable bleeding  --ACT goal 180-200  --hold lipitor for now given likely hepatic injury during arrest  --hold ACE/ARB for now given likely reduced renal fxn after arrest  --holding beta blocker given shock   --went down to cath lab today, got more balloon and stent to prox left main (was 90% stenosed).     Pulmonary:  # Acute hypoxic/hypercarbic respiratory failure  # Pulmonary edema  # B/l Pleural effusion s/p b/l chest tube at OSH  # Possible diffuse alveolar hemorrhage Vent Settings:  ETT in stable position.  Now weaning vent requirements.  CXR: Lines in stable position. Pulmonary edema, b/l pleural effusions s/p b/l chest tube placement at OSH (not sterile).  --wean vent as able  --daily CXR  Chest CT  --Q2h ABGs for now       GI and Nutrition: No known medical hx.  --monitor BID LFTs  --NPO for now - nutrition consult pending feeding tube placement  --bowel regimen - on hold for now  --GI Prophylaxis: PPI       Renal, Fluid and Electrolytes: Cr unknown baseline. 1.25 on arrival.  --monitor urine output  --maintain K>3 and Mg>2       Infectious Disease:  # Presumed aspiration No signs of infection. Leukocytosis c/w arrest. Blood cultures collected.   --vancomycin/zosyn x5 days for ECMO  --daily blood cultures  --monitor for signs of infection given cooling, lines, and leukocytosis   Hematology and Oncology:  # Acute blood loss anemia Receiving heparin for ECMO and ASA/ticagrelor for ORIANA.   --cryo PRN fibrinogen < 200; FFP for INR >2  --Transfuse for Hgb<10  --holding heparin given bleeding, will allow heparin through distal reperfusion cannula  - S/p massive transfusion protocol, s/p Factor VII  --US LE w/ arterial duplex per ECMO protocol   --DVT PPX: Heparin as above    Endocrinology: No known medical history. BG elevated.  --insulin gtt  --f/u HgbA1c    Lines: L femoral arterial and venous ECMO cannulae July 31, 2019  R femoral arterial line July 31, 2019  R radial arterial line July 31, 2019  ETT July 31, 2019  Bailey catheter July 31, 2019  OG tube July 31, 2019  Restraint: needed    Current lines are required for patient management       Family update by me today: Yes      Code Status:     The pt was discussed and evaluated with Dr. Nicole, attending physician, who agrees with the assessment and plan above.     Thank you for allowing me to care for this patient. This has been discussed with the attending physician.    La Ortega MD  Cardiology Fellow, PGY-5    I have seen and examined the patient with the CSI team. I agree with the assessment and plan of the note above.I have reviewed pertinent labs.     Warren Nicole MD  Interventional Cardiology  Pager: [phone]

## 2019-08-04 NOTE — PHARMACY-VANCOMYCIN DOSING SERVICE
"Pharmacy Vancomycin Consult Note    Date of Service August 3, 2019  Patient's  1950   68 year old, male    Indication: Aspiration Pneumonia  Goal Trough Level: 15-20 mg/L  Day of Therapy: 3  Current Vancomycin regimen: intermittent vancomycin    Current estimated CrCl = Estimated Creatinine Clearance: 37 mL/min (A) (based on SCr of 2.3 mg/dL (H)).    Creatinine for last 3 days  2019: 11:30 PM Creatinine 1.25 mg/dL  2019:  4:30 AM Creatinine 1.12 mg/dL;  9:42 AM Creatinine 1.16 mg/dL;  3:55 PM Creatinine 1.28 mg/dL; 10:00 PM Creatinine 1.37 mg/dL  2019:  3:34 AM Creatinine 1.65 mg/dL;  7:20 AM Creatinine 1.75 mg/dL;  4:15 PM Creatinine 1.84 mg/dL;  9:50 PM Creatinine 1.91 mg/dL  8/3/2019:  3:44 AM Creatinine 1.89 mg/dL;  9:32 AM Creatinine 2.25 mg/dL;  3:46 PM Creatinine 2.30 mg/dL    Recent Vancomycin Levels (past 3 days)  8/3/2019:  1:51 PM Vancomycin Level 15.7 mg/L    Body mass index is 29.86 kg/m .  Height is 5' 11\".   Wt Readings from Last 2 Encounters:   19 97.1 kg (214 lb 1.1 oz)       Vancomycin IV Administrations (past 72 hours)                   vancomycin (VANCOCIN) 2,000 mg in sodium chloride 0.9 % 250 mL intermittent infusion (mg) 2,000 mg New Bag 19 1516     2,000 mg New Bag 19 1627              Nephrotoxins and other renal medications (From now, onward)    Start     Dose/Rate Route Frequency Ordered Stop    19 0930  phenylephrine (SHUN-SYNEPHRINE) 200 mg in sodium chloride 0.9 % 250 mL infusion      0.5-6 mcg/kg/min × 95 kg (Dosing Weight)  3.6-42.8 mL/hr  Intravenous CONTINUOUS 19 0920      19 0913  vancomycin place rothman - receiving intermittent dosing      1 each Does not apply SEE ADMIN INSTRUCTIONS 19 0914      19 1515  piperacillin-tazobactam (ZOSYN) 4.5 g vial to attach to  mL bag      4.5 g  over 1 Hours Intravenous EVERY 6 HOURS 19 0927 19 1459    19 5170  vasopressin (VASOSTRICT) 40 Units in D5W 40 " mL infusion      0.5-4 Units/hr  0.5-4 mL/hr  Intravenous CONTINUOUS 07/31/19 2322      07/31/19 2322  norepinephrine (LEVOPHED) 16 mg in  mL infusion      0.03-0.4 mcg/kg/min × 95 kg (Dosing Weight)  2.7-35.6 mL/hr  Intravenous CONTINUOUS PRN 07/31/19 2322           Contrast Orders - past 72 hours (72h ago, onward)    Start     Dose/Rate Route Frequency Ordered Stop    08/02/19 1152  iopamidol (ISOVUE-370) solution  Status:  Discontinued        ONCE PRN 08/02/19 1152 08/02/19 1226    08/01/19 0316  iopamidol (ISOVUE-370) solution  Status:  Discontinued        ONCE PRN 08/01/19 0316 08/01/19 0417        Interpretation of levels and current regimen:  Trough level is Therapeutic  Has serum creatinine changed > 50% in last 72 hours: n/a  Urine output: diminished urine output  Renal Function: BLADIMIR on CRRT    Plan:  1.  Redose vancomycin 2000 mg IV x 1 now then continue intermittent vancomycin given multiple issues with filter clotting. If CRRT stays on consistently over next 1-2 shifts can consider scheduling q24h.  2.  Pharmacy will check trough levels as appropriate in 1-3 Days.    3.  Serum creatinine levels will be ordered daily for the first week of therapy and at least twice weekly for subsequent weeks.      Amber Robison, PharmD  August 3, 2019              .

## 2019-08-04 NOTE — PROGRESS NOTES
ECMO Shift Summary:    Patient remains on VA ECMO, all equipment is functioning and alarms are appropriately set. RPM's 4200 with flow range 4.43-4.65 L/min. Sweep gas is at 7 LPM and FiO2 60%. Circuit remains free of air, clot and fibrin. Cannulas are secure with no bleeding from site. Extremities are warm to touch. Suctioned ETT for scant secretions.    Significant Shift Events: We went to CT for a scan and back without Incident. We also had to reposition the IABP catheter which had pulled distal from the aortic arch.    Vent settings:  Ventilation Mode: APRV  (Airway Pressure Release Ventilation)  FiO2 (%): 40 %  Oxygen Concentration (%): 40 %  Resp: 13  .    Heparin is running at 500 u/hr, ACT range 191-195.    Urine output is as charted, blood loss was a small amount of oozing from the cannula site. No product given.      Intake/Output Summary (Last 24 hours) at 8/4/2019 0615  Last data filed at 8/4/2019 0600  Gross per 24 hour   Intake 3783.04 ml   Output 3125 ml   Net 658.04 ml       ECHO:  No results found for this or any previous visit.No results found for this or any previous visit.    CXR:  Recent Results (from the past 24 hour(s))   CT Head w/o Contrast    Narrative    PRELIMINARY REPORT - The following report is a preliminary  interpretation.        Impression    Impression: Diffuse loss of gray-white matter differentiation with  intracranial edema are compatible with diffuse anoxic brain injury.        Labs:  Recent Labs   Lab 08/04/19  0545 08/04/19  0339 08/04/19  0205 08/03/19  2329   PH 7.47* 7.40 7.40 7.41   PCO2 28* 32* 31* 31*   PO2 138* 99 106* 89   HCO3 20* 20* 19* 20*   O2PER 40 40 40 40       Lab Results   Component Value Date    HGB 9.4 (L) 08/04/2019    PHGB 70 (H) 08/04/2019    PLT 58 (L) 08/04/2019    FIBR 372 08/04/2019    INR 3.00 (H) 08/04/2019    PTT 68 (H) 08/04/2019    DD >20.0 (H) 08/04/2019    AXA <0.10 08/04/2019    ANTCH 50 (L) 08/03/2019         Plan is to remain on ECMO  support at this time.      Curtis Connelly, RRT  8/4/2019 6:15 AM

## 2019-08-04 NOTE — PROGRESS NOTES
EEG CLINICAL NEUROPHYSIOLOGY PRELIMINARY REPORT    Video-EEG through 10:00 today reviewed. Off fentanyl since 8 AM yesterday, off all sedative drips since approx 6 AM today. EEG continues severely suppressed with long periods of no clear electrocerebral activity in spite of high (2 uV/mm) sensitvities and long interelectrode distahces.. Respirator artefact predominates. Current ICU setting and other technical factors do not allow a formal determination of electrocerebral inactivity. No improvement following discontinuation of sedative drips. No seizures.    Full report to follow.    Kwaku Freed MD  Pager [phone]

## 2019-08-04 NOTE — PROGRESS NOTES
Cardiology Critical Care Note - Cardiology  Warren Nicole M.D.  The patient remains unstable in the ICU with on-going need for ventilator support, parenteral medications for the adjustment of blood pressure and cardiac output and maintenance of renal function.      The patient is seen for prolonged ventilator management requiring oxygen and/or pressure modulation; shock requiring vasopressor and or inotropic agents; low cardiac output necessitating inotropic agents, vasopressors and afterload reducing agents; VA ECMO; acute renal failure requiring fluid and diuretic management; sepsis requiring antibiotic selection and monitoring, vasopressors, fluid management to maintain blood pressure and secondary organ function Anoxic brain injury requiring hypernatremia    I personally reviewed:  Arterial and venous blood gases to assess acid base balance, oxygenation, and ventilator settings.    Hemodynamic parameters were assessed such as  RAP, estimated LVEDP cardiac output and vascular resistances in order to adjust fluids and infused medications for blood pressure and cardiac output maintenance.  Ventilatory settings and/or supplement oxygen needs in order to obtain optimal oxygenation and electrolyte balance at low possible pressure support and inspired oxygen tension      ECMO Attending Progress Note  [id number]/[id number]    Atilio Szymanski is a 68 year old male who was cannulated for ECMO LM thrombosis due to chronic CAD and RCA disease.  Patient had symptoms of advanced heart failure prior to his arrest and his baseline LV function is unknown.    Interval events: stented the ostial LM yesterday and rewarmed. His pressure requirements have increased and he has shock liver that explains his lactate levels. He is on CCRT    Head CT showed diffuse anoxic brain injury that evolved with edema and poor grey white matter differentiation.  His prognosis has become significantly worse with this finding.    Physical Exam:  Temp:   [97.2  F (36.2  C)-99.7  F (37.6  C)] 97.3  F (36.3  C)  Heart Rate:  [55-73] 58  Resp:  [13] 13  MAP:  [60 mmHg-96 mmHg] 72 mmHg  Arterial Line BP: ()/(26-67) 121/57  FiO2 (%):  [40 %] 40 %  SpO2:  [90 %-100 %] 100 %    Intake/Output Summary (Last 24 hours) at 8/3/2019 1012  Last data filed at 8/3/2019 0900  Gross per 24 hour   Intake 5055.01 ml   Output 1824 ml   Net 3231.01 ml    Ventilation Mode: APRV  (Airway Pressure Release Ventilation)  FiO2 (%): 40 %  Oxygen Concentration (%): 40 %  Resp: 13     General: no acute distress, intubated and sedated  HEENT: PERRLA, conjunctiva clear, without icterus or pallor, oropharyx clear  Cardiac: RRR nl S1S2   Lungs: clear to auscultation and percussion bilaterally anterior  Abdomen: soft, nontender, non distended, no hepatosplenomegaly or masses  Extremities: without edema or cyanosis; pulses are dopplerable;   Skin: normal skin appearance without worrisome lesions.   Neurologic:intubated and sedated,     Labs:  Recent Labs   Lab 08/04/19  1019 08/04/19  0835 08/04/19  0545 08/04/19  0339   PH 7.45 7.45 7.47* 7.40   PCO2 31* 29* 28* 32*   PO2 119* 122* 138* 99   HCO3 22 20* 20* 20*   O2PER 40 40 40 40      Recent Labs   Lab 08/04/19  0339 08/03/19  2154 08/03/19  1546 08/03/19  0932   WBC 11.3* 9.2 8.9 8.6   HGB 9.4* 9.8* 10.0* 8.5*     Creatinine   Date Value Ref Range Status   08/04/2019 2.17 (H) 0.66 - 1.25 mg/dL Final   08/04/2019 2.31 (H) 0.66 - 1.25 mg/dL Final   08/03/2019 2.38 (H) 0.66 - 1.25 mg/dL Final   08/03/2019 2.30 (H) 0.66 - 1.25 mg/dL Final       ECMO Issues including assessments and plan on DOS 8/42019:  Neuro: Sedated for mechanical ventilation and ECMO.  NIRS stable 60s b/l   CV: Cardiogenic shock.  Hemodynamically stable on vaso 2, levo 0.05 phenylephrine 0.1    Pulm: Flows unchanged at 3.8, Sweep unchanged at 5, Keep vent settings at rest settings   FEN/Renal: Electrolytes stable w/ replacement protocols in place, 1.9 Cr stable, UOP  stable  Heme: ACT goal: 190, Hemoglobin 8.5 .  Goals: if O2 sat >85% Hgb >8.  If O2 Sat <85% keep Hgb 10-12.  Minimal oozing around the ECMO cannulas.  ID: Receiving empiric antibiotics  Cannulae: Position is acceptable on exam and the available imaging.  Distal perfusion cannula is in place and patent.     I have personally reviewed the ECMO flows, oxygenation and CO2 clearance, anticoagulation, and cannula position.  I have also personally assessed the patient's systemic response with hemodynamics, oxygenation, ventilation, and bleeding.       The patient requires continued ECMO support and management in the ICU.        I have seen and examined the patient with the CSI team. I agree with the assessment and plan of the note above.I have reviewed pertinent labs.     Warren Nicole MD  Interventional Cardiology  Pager: [phone]

## 2019-08-04 NOTE — PROGRESS NOTES
CRRT STATUS NOTE    DATA:  Time:  5:21 AM  Pressures WNL:  YES  Filter Status:  WDL    Problems Reported/Alarms Noted:  Access pressure alarms when restarted, but resolved.    Supplies Present:  YES    ASSESSMENT:  Patient Net Fluid Balance:  +1,790cc yesterday, +13,919cc since admission  Vital Signs:  T 97.3, HR 50-60's with ectopy, MAP>65  Labs:  Na 142, K 4.5, Cr 2.31, WBC 11.3, HGB 9.4  Goals of Therapy:  intake=output    INTERVENTIONS:   CRRT restarted for procedure.    PLAN:  Continue with plan of care. Monitor closely and call CRRT resource RN with questions or concerns at #[phone].

## 2019-08-04 NOTE — PHARMACY-VANCOMYCIN DOSING SERVICE
Pharmacy Empiric Dose Change Per Policy - vancomycin  Original Dose Ordered: intermittent dosing  Dose Changed To: 2000 mg IV q24 hours    Dose was changed to intermittent given issues with CRRT circuit clotting. Still having some clotting and required two circuit changes in past 24 hours, but the total time that CRRT was off was minimal. Therefore, should be able to schedule vancomycin. Will re-evaluate if CRRT goes down for a significant amount of time.    Estimated Creatinine Clearance: 39.3 mL/min (A) (based on SCr of 2.17 mg/dL (H)).  Will continue to follow and modify dosage according to levels, organ function and clinical condition    Carmelo Sykes, PharmD

## 2019-08-04 NOTE — PLAN OF CARE
D;Pt continue on V-A ECMO RPM 4200 Floew>4's.Sweep 7 FIO2 60%Ventilation Mode: APRV  (Airway Pressure Release Ventilation)  FiO2 (%): 40 %  Oxygen Concentration (%): 40 %  Resp: 13   setting unchanged.Down to CT for head.Heparin gtt at 500u/hr till 0500 down to 400u./hr.Restarted CRRT after returned.Able to take -50cc/hr.No return pressure issue exept only 2 times.Bleeding from cannula sire.Last Hgb 8.4.IABP site misplaced n oted when changed dressing.Advanced about 5cm by on call Confirmed by CXR.All  sedations and pain meds stopped by 0400.SR w/frequent ectopies PVC's bigeminies.  I;A;Tolerated to wean pressors.Bleeding since ACT is hi.  Notified H.O.  P;Continue to provide ECMO cares.Assess neuro status for any changes since no pain med and sedations.Assess fluids  Status.

## 2019-08-04 NOTE — PROGRESS NOTES
ECMO Shift Summary:    Patient remains on VA ECMO, all equipment is functioning and alarms are appropriately set. RPM's 4200 with flow range 4.2-4.6 L/min. Sweep gas is at 6 LPM and FiO2 60%. Circuit remains free of air and clot, fibrin strands are visible thru the entire circuit and especially heavy at connections. Cannulas are secure with no bleeding from site. Extremities are warm. Suctioned ETT for small amount of tan secretions.    Significant Shift Events: Viviana clotted twice this shift and had to be restarted, heparin goal is 180-200 and within target range.    Vent settings:  Ventilation Mode: APRV  (Airway Pressure Release Ventilation)  FiO2 (%): 40 %  Oxygen Concentration (%): 40 %  Resp: 13  .    Heparin is running at 500 u/hr, ACT range 180-200.    Urine output is as charted, blood loss was oozing from groin site. Product given included 1 unit(s) PRBC and 1 unit(s) PLT.      Intake/Output Summary (Last 24 hours) at 8/4/2019 1753  Last data filed at 8/4/2019 1700  Gross per 24 hour   Intake 2403.95 ml   Output 3797 ml   Net -1393.05 ml       ECHO:  No results found for this or any previous visit.No results found for this or any previous visit.    CXR:  Recent Results (from the past 24 hour(s))   CT Head w/o Contrast   Result Value    Radiologist flags Diffuse anoxic brain injury (Urgent)    Narrative    CT HEAD W/O CONTRAST 8/4/2019 12:18 AM    History: Cardiac arrest 3 days ago    Comparison: CT head 8/1/2019.    Technique: Using multidetector thin collimation helical acquisition  technique, axial, coronal and sagittal CT images from the skull base  to the vertex were obtained without intravenous contrast.     Findings:    Scattered areas of abnormal hypoattenuation throughout the bilateral  cerebral and cerebellar hemispheres. Associated multifocal loss of  gray-white matter differentiation most pronounced in the occipital  lobes and bifrontal lobes. Generalized increased intracranial edema  with loss  of sulci and effacement of the ventricles. There is crowding  of the basal cisterns. No midline shift.    Pansinus mucosal thickening and air-fluid levels. The mastoid air  cells are clear.       Impression    Impression: Diffuse anoxic brain injury with infarct and associated  edema. No herniation.    [Urgent Result: Diffuse anoxic brain injury]    Finding was identified on 8/4/2019 1:07 AM.     Dr. Mosley (Cardiology Fellow overnight) was contacted by Dr. Perez at  8/4/2019 1:19 AM and verbalized understanding of the urgent finding.     I have personally reviewed the examination and initial interpretation  and I agree with the findings.    KIRSTEN GANDARA MD   XR Chest Port 1 View    Narrative    Exam: XR CHEST PORT 1 VW, 8/4/2019 4:07 AM    Indication: Check endotracheal tube placement and ECLS cannula  placement. DO NOT log-roll patient.  Place film under patient using  patient safety handling process.    Comparison: Chest x-ray 8/3/2019    Findings:  AP single view of the chest. Tip of the endotracheal tube projects  over the midthoracic trachea. Radiopaque marker of the intra-aortic  balloon pump projects 1 cm below the andrey. Inferior approach ECMO  cannula, central venous catheter, bilateral chest tubes, enteric  gastric and feeding tubes are in stable position. Grossly unchanged  mixed interstitial and airspace opacities in bilateral lungs. No  significant pneumothorax. Left costophrenic angle is collimated  outside the field-of-view.      Impression    Impression:   1. Grossly unchanged mixed interstitial and airspace opacity in  bilateral lungs, diffuse infection versus ARDS versus pulmonary edema.  2. Bilateral chest tubes in place without significant pneumothorax.   3. Radiopaque marker of the intra-aortic balloon pump projects 1 cm  below the andrey.   4. Lines and tubes as above.    I have personally reviewed the examination and initial interpretation  and I agree with the findings.      MD TRAMAINE   XR Chest Port 1 View    Narrative    Exam: XR CHEST PORT 1 VW, 8/4/2019 6:23 AM    Indication: iabp advanced    Comparison: Same day chest x-ray at 4:56 AM    Findings:   AP single view of the chest. Radiopaque marker of the intra-aortic  balloon pump now projects at the level of andrey. Additional lines and  tubes are grossly unchanged. Lung parenchyma is unchanged.      Impression    Impression: Radiopaque marker of the intra-aortic balloon pump now  projects at the level of andrey. Recommend advancing between 3 and 3.5  cm.    I have personally reviewed the examination and initial interpretation  and I agree with the findings.    JERRY REDD MD   XR Chest Port 1 View    Narrative    Exam: XR CHEST PORT 1 VW, 8/4/2019 6:21 AM    Indication: IABP advanced 2cm    Comparison: 70 chest x-ray    Findings:   AP single view of the chest. Interval advancement of intra-aortic  balloon pump with radiopaque marker now projects 10.6 cm below the  andrey. Other lines and tubes are in stable position. Lung parenchyma  is grossly unchanged.      Impression    Impression:   1. Radiopaque marker of the intra-aortic balloon pump projects about  11 cm below the andrey. Recommend adjustment.  2. No interval change in the perihilar interstitial and alveolar  changes.    I have personally reviewed the examination and initial interpretation  and I agree with the findings.    JERRY REDD MD       Labs:  Recent Labs   Lab 08/04/19  1615 08/04/19  1415 08/04/19  1208 08/04/19  1019   PH 7.43 7.44 7.44 7.45   PCO2 34* 33* 32* 31*   PO2 89 89 109* 119*   HCO3 23 23 22 22   O2PER 40 40 40 40       Lab Results   Component Value Date    HGB 9.2 (L) 08/04/2019    PHGB 70 (H) 08/04/2019    PLT 73 (L) 08/04/2019    FIBR 398 08/04/2019    INR 2.22 (H) 08/04/2019    PTT 56 (H) 08/04/2019    DD >20.0 (H) 08/04/2019    AXA <0.10 08/04/2019    ANTCH 41 (L) 08/04/2019         Plan is continue to provide support and travel to  [facility] for brain study in am.      Serena Whittington, RRT  8/4/2019 5:53 PM

## 2019-08-04 NOTE — PLAN OF CARE
Pt remains intubated, sedated, on ECMO (see ECMO specialist note) with IABP and CRRT.   CRRT with goal I=O, currently unable to pull fluids d/t hypotension and increased pressor requirements.  Levophed, vasopressin and phenylephrine titrated for map >60. 2 units PRBC, 360ml NS and 400ml albumin given for hypotension and ECMO circuit support. Tube feeding started per order. Bedside Echo done.  Will continue to monitor pt and notify MD as needed. See flowsheets for details.

## 2019-08-05 NOTE — CONSULTS
Neurology Consultation    Atilio Szymanski MRN# [id number]   YOB: 1950 Age: 68 year old   Date of Admission: 7/31/2019     Reason for consult: I was asked by Dr. Ortega to evaluate this patient for prognostication after cardiac arrest.           Assessment and Plan:   Mr. Szymanski is a 68-year-old male with past medical history of hypertension and hyperlipidemia who presents ischemic brain injury following over an hour of PEA arrest. He is currently on ECMO requiring 2 pressors and has not shown any signs of improving exam despite being 36hr off sedation and 3 days post-rewarming. With his CT showing diffuse ischemic injury with extensive swelling he unfortunately has a very poor prognosis.       Recommendations:  -There is no evidence that continuous hypertonic fluids improve outcome in the case of swelling 2/2 anoxic brain injury, so we would recommend stopping. If you would like to continue 3%, keep sodium between 140-150.  -Minimize sedation to allow for optimal exam  -Consider formal brain death physical exam as his course progresses given loss of all tested brainstem reflexes  -We will continue to follow      Subha Spear M.D.  PGY-2 Neurology  Pager #[phone]           Chief Complaint:   Ischemic brain injury     Unable to obtain a history from the patient due to mental status, intubation.  History gathered from chart and requesting staff         History of Present Illness:   This patient is a 68 year old male who presents with ischemic brain injury following PEA arrest on 7/31.  There was no bystander CPR as he was in a private vehicle on his way to the hospital when he became unresponsive.  He is 3 days post-rewarming and 2 days off sedation with fentanyl and Versed.  He had CPR over one hour of which point he demonstrated some movement and opened eyes.  At that point he was started on ECMO which continues today.  He also had 7 stents placed in his coronary arteries as well as a national  massive transfusion protocol due to high volume bleeding from bilateral chest tubes.  He has not been responsive since rewarming.  Today nursing noted pupils were fixed with right pupil larger.  We were asked to comment on prognostication                 Past Medical History:   Hypertension  Hyperlipidemia  Coronary artery disease             Past Surgical History:   Unknown           Social History:   Family has been present intermittently, otherwise unknown.           Family History:   unknown          Immunizations:   unknown          Allergies:   caffeine          Medications:     I have reviewed this patient's current medications  Current Facility-Administered Medications   Medication     albumin human 5 % injection 12.5 g     albumin human 5 % injection     amiodarone (NEXTERONE) 1,500 mg in D5W 250 mL infusion     amiodarone (NEXTERONE) 1,500 mg in D5W 250 mL infusion     artificial tears ophthalmic ointment     aspirin (ASA) chewable tablet 81 mg     calcium chloride 1 g in sodium chloride 0.9 % 100 mL intermittent infusion     calcium chloride 2 g in sodium chloride 0.9 % 100 mL intermittent infusion     calcium chloride 3 g in sodium chloride 0.9 % 200 mL intermittent infusion     calcium chloride 4 g in sodium chloride 0.9 % 200 mL intermittent infusion     dextrose 10 % 1,000 mL infusion     dextrose 10% infusion     glucose gel 15-30 g    Or     dextrose 50 % injection 25-50 mL    Or     glucagon injection 1 mg     dialysate for CVVHD & CVVHDF (Phoxillum BK4/2.5)     EPINEPHrine (ADRENALIN) 5 mg in sodium chloride 0.9 % 250 mL infusion     fentaNYL (PF) (SUBLIMAZE) injection 50 mcg     fentaNYL (SUBLIMAZE) infusion     heparin 2 unit/mL in 0.9% NaCl (for REPERFUSION CATHETER)     heparin infusion 25,000 units in 0.45% NaCl 250 mL     insulin aspart (NovoLOG) inj (RAPID ACTING)     lidocaine (LMX4) cream     lidocaine 1 % 0.1-1 mL     magnesium sulfate 2 g in NS intermittent infusion (PharMEDium or FV  Cmpd)     Medication Considerations - To maintain platelet inhibition after discontinuation of cangrelor (KENGREAL) [see admin instructions]     midazolam (VERSED) drip - ADULT 100 mg/100 mL in NS PRE-MIX     midazolam (VERSED) injection 1-3 mg     naloxone (NARCAN) injection 0.1-0.4 mg     No heparin required     norepinephrine (LEVOPHED) 16 mg in  mL infusion     oxymetazoline (AFRIN) 0.05 % spray 2 spray     pantoprazole (PROTONIX) 40 mg IV push injection     phenylephrine (SHUN-SYNEPHRINE) 200 mg in sodium chloride 0.9 % 250 mL infusion     piperacillin-tazobactam (ZOSYN) 4.5 g vial to attach to  mL bag     POST-filter replacement solution for CVVHD & CVVHDF (Phoxillum BK4/2.5)     potassium chloride 20 mEq in 50 mL intermittent infusion     PRE-filter replacement solution for CVVHD & CVVHDF (Phoxillum BK4/2.5)     senna-docusate (SENOKOT-S/PERICOLACE) 8.6-50 MG per tablet 1 tablet     sodium bicarbonate 8.4 % injection 50 mEq     sodium chloride (PF) 0.9% PF flush 3 mL     sodium chloride (PF) 0.9% PF flush 3 mL     sodium chloride 3% infusion     sodium phosphate 20 mmol in D5W 100 mL intermittent infusion     ticagrelor (BRILINTA) tablet 90 mg     vancomycin (VANCOCIN) 2,000 mg in sodium chloride 0.9 % 250 mL intermittent infusion     vasopressin (VASOSTRICT) 40 Units in D5W 40 mL infusion              Review of Systems:   The 10 point Review of Systems is negative other than noted in the HPI         Neurology Focused Physical Exam:   The following assessments were done and were normal unless otherwise specified:  General Comments:   Patient is intubated off sedation  with no spontaneous movements, no mild clonus, not breathing over vent   Mental Status:   Nonresponsive to name or physical stimulus, not on sedated   Cranial Nerves:   no corneal response, bilaterally dilated and fixed pupils right larger than left, does not blink to threat, no cough, no gag, no oculocephalic   Motor:   Muscle bulk -  "normal  Untestable strength.  Does not move to noxious stimuli or spontaneously   Sensory perception:   No response to noxious stimuli   Reflexes:   Deep tendon reflexes: 0+ patellar and Achilles, 1+ bilateral brachioradialis, negative [place], Babinski equivocal bilaterally   Cerebellar Coordination:   Unable to test due to mental status   Gait / Stance:   Unable to test               Data:     Sodium 150    Last Midazolam @ 0400 on 8/4  Last Fentanyl @ 0700 on 8/3    NM brain scan 8/5: \"The flow series demonstrates normal flow to the brain.  The delayed static images demonstrates brain uptake.\"    Head CT: \"Diffuse anoxic brain injury with infarct and associated  edema. No herniation.\"      "

## 2019-08-05 NOTE — PROGRESS NOTES
Cardiology - CSI service    Interval events: Sedation has been off for over 24 hour and no response to stimuli, no gag or cough and not overbreathing the vent. Pupils equal and sluggish.  Vent settings of APRV unchanged with TV ~100's for last few days when pt TV decreased to 20's at ~0400. MD notified and stat chest x-ray performed. Pt turned to left side and TV increased to 700's. Pt having frequent ectopy with HR 38-75. Pt shocked once at 0620 for  rate. Amiodarone loaded.    Concern about positioning of chest tubes -430, 220 since midnight. Nuclear brain perfusion scan was normal. Possible decannulation tomm.       Temp:  [97.2  F (36.2  C)-97.7  F (36.5  C)] 97.2  F (36.2  C)  Heart Rate:  [37-82] 42  Resp:  [13] 13  MAP:  [46 mmHg-89 mmHg] 73 mmHg  Arterial Line BP: ()/(23-63) 137/51  FiO2 (%):  [40 %] 40 %  SpO2:  [93 %-100 %] 94 %  IABP via LFA, 1:1, triggered by EKG, augmented MAPs 70, L radial and distal pulse in tact   ECMO settings:   RPM's 4200 with flow range 4.43-4.65 L/min. Sweep gas is at 7 LPM and FiO2 60%    I/O last 3 completed shifts:  In: 4205.1 [I.V.:2365.1; NG/GT:300]  Out: 3439 [Other:2999; Chest Tube:440]   +~12392yj since admission    Hemodynamic drips: Levophed 0.08, vaso 3, amio 1.   Selected medications: versed 2, heparin (ACT goal 160-180).     Physical examination:  Vitals:    08/01/19 1200 08/02/19 0300 08/03/19 0515 08/04/19 0445   Weight: 95 kg (209 lb 7 oz) 96 kg (211 lb 10.3 oz) 97.1 kg (214 lb 1.1 oz) 97.5 kg (214 lb 15.2 oz)    08/05/19 0400   Weight: 95 kg (209 lb 7 oz)     GENERAL APPEARANCE: Intubated, sedated. NAD.  HEENT: No icterus, ETT in place, OG tube in place  CARDIOVASCULAR: regular rate and rhythm, normal S1 and S2, no S3 or S4 and no murmur, click or rub. Normal PMI. Pulses dopplerable.  RESP: Coarse bilaterally. Mechanical ventilation.    GASTRO: Soft, bowel sounds hypoactive but present  GENITOURINARY: Bailey in place.  EXTREMITIES: Cool, 1+ edema,  pulses dopplered, as above. VA ECMO cannulas in right groin, ThermoGard  NEURO: Sedated and intubated. Fent and Versed for sedation, as below.  INTEGUMENTARY: No rashes. Cannula/Line sites CDI  LINES/TUBES/DRAINS: (noted below) V-A ECMO Cannulas L groin, arterial sheath and ThermoGard in R groin. R radial Arterial line. ETT. OG. Bailey Catheter.    Labs were reviewed.    BMP  Recent Labs   Lab 08/05/19 0337 08/05/19 0204 08/04/19 2158 08/04/19 2045 08/04/19  1650 08/04/19  1615 08/04/19  1019   * 144 145* 146* 143  --  143   POTASSIUM 4.3  --  4.1  --  4.2  --  4.1   CHLORIDE 118*  --  114*  --  111*  --  110*   JEANNE 7.2*  --  7.2*  --  7.4*  --  7.9*   CO2 25  --  25  --  23  --  22   BUN 38*  --  39*  --  37*  --  35*   CR 1.85*  --  1.98*  --  2.11*  --  2.17*   *  --  165*  163*  --  156* 158* 140*  141*     LFTs  Recent Labs   Lab 08/05/19 0337 08/04/19 2158 08/04/19 1650 08/04/19  1019   ALKPHOS 74 68 71 65   AST 2,521* 2,905* 3,396* 3,938*   ALT 1,964* 2,089* 2,319* 2,576*   BILITOTAL 4.6* 5.0* 5.8* 6.4*   PROTTOTAL 4.3* 4.3* 4.6* 4.5*   ALBUMIN 2.1* 2.4* 2.5* 2.6*      CBC  Recent Labs   Lab 08/05/19 0337 08/04/19 2158 08/04/19 1650 08/04/19  1019   WBC 11.8* 11.7* 11.9* 11.6*   RBC 2.89* 2.91* 3.08* 3.00*   HGB 8.5* 8.6* 9.2* 8.9*   HCT 26.3* 26.2* 27.4* 26.4*   MCV 91 90 89 88   MCH 29.4 29.6 29.9 29.7   MCHC 32.3 32.8 33.6 33.7   RDW 15.7* 15.6* 15.7* 15.4*   PLT 58* 63* 73* 48*     INR  Recent Labs   Lab 08/05/19  0337 08/04/19  2158 08/04/19  1650 08/04/19  1019   INR 1.86* 2.00* 2.22* 2.63*     1.5.    Liver enzymes improving.     IMPRESSION & PLAN  Atilio Szymanski [[id number]] ([place]) is a 68 year old male with a history of HTN, HLD who was admitted on 7/31/2019 for PEA arrest. Arrest at home, no bystander CPR. Transferred from [facility]. ~120 min before placed on ECMO. 3V disease involving culprit LM.     Neurology: Intubated, sedated, paralyzed. Cooled to 36  degrees.  --continue versed, fentanyl, and cis as needed to maintain paralysis   - RASS goal -4 to -5. CT head worsened on day 3 but no herniation.   - Nuclear perfusion scan today. Normal. However, blown pupil. Goal to keep Na at 160.    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 3V disease. ORIANA to LM x1, pLAD x1, pLCx x1, RCA x4.  Peripheral V-A ECMO inserted for refractory PEA. Approximately 120 min downtime. LA initially 9.0 on arrival.   TTE: complete ordered  --wean pressors/inotropes as able  --TTE - near standstill heart with minimal cardiac motion and mitral valve without closure during systole. Nuclear brain perfusion scan was normal. Possible decannulation tomm.   --continue ASA 81mg and ticagrelor 90mg BID  --hold temp at 36 given considerable bleeding  --ACT goal 180-200  --hold lipitor for now given likely hepatic injury during arrest  --hold ACE/ARB for now given likely reduced renal fxn after arrest  --holding beta blocker given shock   --went down to cath lab today, got more balloon and stent to prox left main (was 90% stenosed).     Pulmonary:  # Acute hypoxic/hypercarbic respiratory failure  # Pulmonary edema  # B/l Pleural effusion s/p b/l chest tube at OSH  # Possible diffuse alveolar hemorrhage Vent Settings:  ETT in stable position.  Now weaning vent requirements.  CXR: Lines in stable position. Pulmonary edema, b/l pleural effusions s/p b/l chest tube placement at OSH (not sterile).  --wean vent as able  --daily CXR  Chest CT  --Q2h ABGs for now       GI and Nutrition: No known medical hx.  --monitor BID LFTs  --NPO for now - nutrition consult pending feeding tube placement  --bowel regimen - on hold for now  --GI Prophylaxis: PPI       Renal, Fluid and Electrolytes: Cr unknown baseline. 1.25 on arrival.  --monitor urine output  --maintain K>3 and Mg>2       Infectious Disease:  # Presumed  aspiration No signs of infection. Leukocytosis c/w arrest. Blood cultures collected.   --vancomycin/zosyn x5 days for ECMO  --daily blood cultures  --monitor for signs of infection given cooling, lines, and leukocytosis   Hematology and Oncology:  # Acute blood loss anemia Receiving heparin for ECMO and ASA/ticagrelor for ORIANA.   --cryo PRN fibrinogen < 200; FFP for INR >2  --Transfuse for Hgb<10  --holding heparin given bleeding, will allow heparin through distal reperfusion cannula  - S/p massive transfusion protocol, s/p Factor VII  --US LE w/ arterial duplex per ECMO protocol   --DVT PPX: Heparin as above   Endocrinology: No known medical history. BG elevated.  --insulin gtt  --f/u HgbA1c    Lines: L femoral arterial and venous ECMO cannulae July 31, 2019  R femoral arterial line July 31, 2019  R radial arterial line July 31, 2019  ETT July 31, 2019  Bailey catheter July 31, 2019  OG tube July 31, 2019  Restraint: needed    Current lines are required for patient management       Family update by me today: Yes      Code Status:     The pt was discussed and evaluated with Dr. Nicole, attending physician, who agrees with the assessment and plan above.     Thank you for allowing me to care for this patient. This has been discussed with the attending physician.    La Ortega MD  Cardiology Fellow, PGY-5    I have seen and examined the patient with the CSI team. I agree with the assessment and plan of the note above.I have reviewed pertinent labs.     Warren Nicole MD  Interventional Cardiology  Pager: [phone]

## 2019-08-05 NOTE — PROCEDURES
TYPE OF STUDY:  Inpatient video EEG monitoring.      EEG #-3      DATE OF RECORDING/SERVICE DATE:  08/03/2019      SOURCE FILE DURATION:  23 hours, 54 minutes, 24 seconds.      HISTORY:  Day #3 of video-EEG monitoring in Atilio Szymanski, a 68-year-old status post cardiac arrest.  He is intubated and comatose in ICU.  He had been treated with hypothermia protocol, but is euthermic for this study.  He was treated with fentanyl 75 mcg per hour in the initial 8 hours of the study, but this was terminated following 8:00 in the morning.  Midazolam at low doses (2 mg per hour) was used throughout.      TECHNICAL SUMMARY:  EEG is recorded from scalp electrodes placed according to the 10-20 international system.  Additional electrodes were utilized for artifact detection, referencing and recording from other cerebral regions.  Video was continuously recorded.  Video was reviewed for clinical correlation and to assist with EEG interpretation.      FINDINGS:  Severe suppression of electrocerebral activity continues.  Long stretches with no clear electrocerebral activity, even at 2 microvolts per mm.  Respiratory artifact predominates.  There are occasional potentials for which no clear artifactual source can be found, even on review of the video and other physiologic parameters.  These usually take the form of very low amplitude delta (less than 5 microvolts).        Stimulation is performed following ICU protocol at around 12:42 p.m.  This does not induce any changes in background.      OTHER INTERICTAL ABNORMALITIES:  No periodic discharges.  No electrographic seizures.      IMPRESSION:  Severely abnormal.  There is severe suppression of electrocerebral activity. No clear electrocerebral activity is noted for long periods of recording, even at high sensitivities.  There is no improvement following the discontinuation of fentanyl drip.  EEG is therefore consistent with a very severe diffuse encephalopathy.  The  presence of very sporadic and low amplitude electrocerebral activity cannot be excluded due to multiple sources of artifact in ICU and other technical factors.  Seizure activity is not noted in this study.         KAYLEEN CORTEZ MD             D: 2019   T: 2019   MT: JOAO      Name:     VARSHA NEAL   MRN:      [id number]        Account:        [id number]   :      1950           Procedure Date: 2019      Document: [id number]

## 2019-08-05 NOTE — PROGRESS NOTES
Cardiology Critical Care Note - Cardiology  Warren Nicole M.D.  The patient remains unstable in the ICU with on-going need for ventilator support, parenteral medications for the adjustment of blood pressure and cardiac output and maintenance of renal function.      The patient is seen for prolonged ventilator management requiring oxygen and/or pressure modulation; shock requiring vasopressor and or inotropic agents; low cardiac output necessitating inotropic agents, vasopressors and afterload reducing agents; VA ECMO; acute renal failure requiring fluid and diuretic management; sepsis requiring antibiotic selection and monitoring, vasopressors, fluid management to maintain blood pressure and secondary organ function Anoxic brain injury requiring hypernatremia    I personally reviewed:  Arterial and venous blood gases to assess acid base balance, oxygenation, and ventilator settings.    Hemodynamic parameters were assessed such as  RAP, estimated LVEDP cardiac output and vascular resistances in order to adjust fluids and infused medications for blood pressure and cardiac output maintenance.  Ventilatory settings and/or supplement oxygen needs in order to obtain optimal oxygenation and electrolyte balance at low possible pressure support and inspired oxygen tension      ECMO Attending Progress Note  8/5/2019    Atilio Szymanski is a 68 year old male who was cannulated for ECMO LM thrombosis due to chronic CAD and RCA disease.  Patient had symptoms of advanced heart failure prior to his arrest and his baseline LV function is unknown.    Interval events: stented the ostial LM yesterday and rewarmed. His pressure requirements have increased and he has shock liver that explains his lactate levels. He is on CCRT    Head CT showed diffuse anoxic brain injury that evolved with edema and poor grey white matter differentiation.  His prognosis has become significantly worse with this finding.    Physical Exam:  Temp:   [97.2  F (36.2  C)-97.7  F (36.5  C)] 97.3  F (36.3  C)  Heart Rate:  [37-82] 52  Resp:  [13] 13  MAP:  [46 mmHg-89 mmHg] 82 mmHg  Arterial Line BP: ()/(23-63) 143/56  FiO2 (%):  [40 %] 40 %  SpO2:  [93 %-100 %] 100 %    Intake/Output Summary (Last 24 hours) at 8/3/2019 1012  Last data filed at 8/3/2019 0900  Gross per 24 hour   Intake 5055.01 ml   Output 1824 ml   Net 3231.01 ml    Ventilation Mode: APRV  (Airway Pressure Release Ventilation)  FiO2 (%): 40 %  Oxygen Concentration (%): 40 %  Resp: 13     General: no acute distress, intubated and sedated  HEENT: PERRLA, conjunctiva clear, without icterus or pallor, oropharyx clear  Cardiac: RRR nl S1S2   Lungs: clear to auscultation and percussion bilaterally anterior  Abdomen: soft, nontender, non distended, no hepatosplenomegaly or masses  Extremities: without edema or cyanosis; pulses are dopplerable;   Skin: normal skin appearance without worrisome lesions.   Neurologic:intubated and sedated,     Labs:  Recent Labs   Lab 08/05/19  0755 08/05/19  0617 08/05/19  0337 08/05/19  0204   PH 7.40 7.40 7.38 7.39   PCO2 39 40 42 41   PO2 194* 70* 71* 76*   HCO3 24 25 25 25   O2PER 40.0 40.0 40.0 40.0      Recent Labs   Lab 08/05/19  0337 08/04/19  2158 08/04/19  1650 08/04/19  1019   WBC 11.8* 11.7* 11.9* 11.6*   HGB 8.5* 8.6* 9.2* 8.9*     Creatinine   Date Value Ref Range Status   08/05/2019 1.85 (H) 0.66 - 1.25 mg/dL Final   08/04/2019 1.98 (H) 0.66 - 1.25 mg/dL Final   08/04/2019 2.11 (H) 0.66 - 1.25 mg/dL Final   08/04/2019 2.17 (H) 0.66 - 1.25 mg/dL Final       ECMO Issues including assessments and plan on DOS 8/5/2019:  Neuro: Sedated for mechanical ventilation and ECMO.  NIRS stable 60s b/l   CV: Cardiogenic shock.  Hemodynamically stable on vaso 2, levo 0.05 phenylephrine 0.1    Pulm: Flows unchanged at 3.8, Sweep unchanged at 5, Keep vent settings at rest settings   FEN/Renal: Electrolytes stable w/ replacement protocols in place, 1.9 Cr stable, UOP  stable  Heme: ACT goal: 190, Hemoglobin 8.5 .  Goals: if O2 sat >85% Hgb >8.  If O2 Sat <85% keep Hgb 10-12.  Minimal oozing around the ECMO cannulas.  ID: Receiving empiric antibiotics  Cannulae: Position is acceptable on exam and the available imaging.  Distal perfusion cannula is in place and patent.     I have personally reviewed the ECMO flows, oxygenation and CO2 clearance, anticoagulation, and cannula position.  I have also personally assessed the patient's systemic response with hemodynamics, oxygenation, ventilation, and bleeding.       The patient requires continued ECMO support and management in the ICU.        I have seen and examined the patient with the CSI team. I agree with the assessment and plan of the note above.I have reviewed pertinent labs.     Warren Nicole MD  Interventional Cardiology  Pager: [phone]

## 2019-08-05 NOTE — PROGRESS NOTES
Nephrology Progress Note  08/05/2019         Assessment & Recommendations:   This is a 68 year old male with a PMHx significant for HTN, HLD who had a PEA arrest en route to [facility], no ROSC for 1 hour and VA-ECMO placed at [facility]. Nephrology are being consulted for anuric BLADIMIR.     Anuric BLADIMIR  Baseline Cr on admission was 1.12, and geraldine to 1.75 prior to CRRT being started on 8/2. His obligate intakes far exceed his ability to remove this volume without RRT, and given his prolonged downtime and failure to achieve ROSC, he will likely need RRT for a prolonged period of time for his ischemic tubular injury suffered from his PEA arrest.   - Will continue CRRT via ECMO circuit, dose 25cc/kg/hr, K4 bath. Switch to K2 bath if serum K>5     Electrolytes  Na 149, K 4.2, Cl 120. . ICU team trying to allow for hypernatremia 155-165. He is getting 3% hypertonic saline peripherally. Get Na checks q2 hours. Limit free water flushes.  - if Na >160 by midnight tonight, hold gtt for 2 hours then redraw Na and restart at half the rate.     BP/Volume  Anuric, is hypervolemic on exam. We will plan to continue CRRT and match I=O.     Anemia  Hgb 8.1, received MTP in the cath lab. Baseline hgb 14, dropped to 6.9 during ECMO placement.      Acid/Base balance  HCO3 22, lactic acid rising 2.5. Treat the hypoperfusion, no need for bicarb at this time. pH 7.45/33/265     BMD  Ca 7.2, Phos 3.0. Patient does not have CKD, no need for PTH or vit D     Recommendations were communicated to primary team via note     Seen and discussed with Dr. Lilliam Ortega MD   [phone]    Interval History :   Nursing and provider notes from last 24 hours reviewed.    Atilio Szymanski was seen and examined this morning. He remains intubated, sedation off but no gag or cough reflex. Nuclear scan shows renal blood flow. He had no overnight issues.     Review of Systems:   I reviewed the following systems:  Could not complete ROS    Physical Exam:   I/O last  "3 completed shifts:  In: 4775.05 [I.V.:3055.05; NG/GT:340]  Out: 3477 [Emesis/NG output:200; Other:2597; Chest Tube:680]   Temp 97.3  F (36.3  C)   Resp 13   Ht 1.803 m (5' 11\")   Wt 95 kg (209 lb 7 oz)   SpO2 100%   BMI 29.21 kg/m       GENERAL APPEARANCE: intubated, not sedated  EYES:  no scleral icterus, pupils equal  HENT: mouth without ulcers or lesions  PULM: lungs clear to auscultation bilaterally, equal air movement, no clubbing  CV: regular rhythm, normal rate, no rub     -JVD no     -edema +2   GI: soft, non tender, not distended, bowel sounds are normal  INTEGUMENT: no cyanosis, no rash  NEURO:  no asterixis   Access VA-ECMO    Labs:   All labs reviewed by me  Electrolytes/Renal -   Recent Labs   Lab Test 08/05/19  1410 08/05/19  1016 08/05/19 0337  08/04/19 2158   * 150* 148*   < > 145*   POTASSIUM  --  4.2 4.3  --  4.1   CHLORIDE  --  120* 118*  --  114*   CO2  --  22 25  --  25   BUN  --  44* 38*  --  39*   CR  --  1.94* 1.85*  --  1.98*   GLC  --  186*  190* 185*  --  165*  163*   JEANNE  --  7.2* 7.2*  --  7.2*   MAG  --  2.6* 2.7*  --  2.7*   PHOS  --  3.0 3.7  --  3.9    < > = values in this interval not displayed.     CBC -   Recent Labs   Lab Test 08/05/19  1016 08/05/19  0337 08/04/19 2158   WBC 10.7 11.8* 11.7*   HGB 8.1* 8.5* 8.6*   PLT 49* 58* 63*     LFTs -   Recent Labs   Lab Test 08/05/19  1016 08/05/19  0337 08/04/19 2158   ALKPHOS 78 74 68   BILITOTAL 4.7* 4.6* 5.0*   ALT 1,690* 1,964* 2,089*   AST 1,995* 2,521* 2,905*   PROTTOTAL 4.2* 4.3* 4.3*   ALBUMIN 2.0* 2.1* 2.4*     Iron Panel - No lab results found.    Imaging:  All imaging studies reviewed by me.     Current Medications:    albumin human  12.5 g Intravenous Once     albumin human         artificial tears   Both Eyes Q8H     aspirin  81 mg Per Feeding Tube Daily     insulin aspart  1-6 Units Subcutaneous Q4H     pantoprazole (PROTONIX) IV  40 mg Intravenous Daily     piperacillin-tazobactam  4.5 g Intravenous Q6H "     senna-docusate  1 tablet Oral or Feeding Tube At Bedtime     sodium chloride (PF)  3 mL Intracatheter Q8H     ticagrelor  90 mg Oral BID     vancomycin (VANCOCIN) IV  2,000 mg (central catheter) Intravenous Q24H       amiodarone 1 mg/min (08/05/19 1400)     amiodarone       IV fluid REPLACEMENT ONLY       dextrose Stopped (08/03/19 1800)     CRRT replacement solution 12.5 mL/kg/hr (08/05/19 1451)     EPINEPHrine IV infusion ADULT       fentaNYL 50 mcg/hr (08/03/19 0700)     heparin 2 unit/mL in 0.9% NaCl 3 mL/hr (08/05/19 1400)     HEParin 700 Units/hr (08/05/19 1400)     - MEDICATION INSTRUCTIONS -       midazolam Stopped (08/04/19 0500)     - MEDICATION INSTRUCTIONS -       norepinephrine 0.05 mcg/kg/min (08/05/19 1400)     phenylephrine Stopped (08/04/19 1100)     CRRT replacement solution 2.105 mL/kg/hr (08/05/19 1020)     CRRT replacement solution 12.5 mL/kg/hr (08/05/19 1451)     sodium chloride 3% 100 mL/hr at 08/05/19 1400     vasopressin (PITRESSIN) infusion ADULT (40 mL) 3 Units/hr (08/05/19 1400)     Makayla Ortega MD

## 2019-08-05 NOTE — PROGRESS NOTES
D: Pt remains intubated, on VA ECMO with IABP. Sedation has been off for over 24 hour and no response to stimuli, no gag or cough and not overbreathing the vent. Pupils equal and sluggish. Vent settings of APRV unchanged with TV ~100's for last few days when pt TV decreased to 20's at ~0400. MD notified and stat chest x-ray performed. Pt turned to left side and TV increased to 700's. MD notified again. Pt having frequent ectopy with HR 38-75. Pt shocked once at 0620 for  rate. Amiodarone loaded over 30 minutes. CRRT having minimal pressure alarms. No issues with IABP overnight.   P: Plan for [facility] brain scan at 0900. Consult pulmonary for change in respiratory status. Family care conference this afternoon. Continue with plan of care, monitor closely.

## 2019-08-05 NOTE — PROGRESS NOTES
ECMO Shift Summary:    Patient remains on V/A ECMO, all equipment is functioning and alarms are appropriately set. RPM's 4200 with flow range 4.48-4.63 L/min. Sweep gas is at 6 LPM and FiO2 60%. Circuit remains free of air, clot, some fibrin visible in the corners and connectors. Cannulas are secure with no bleeding from site. Extremities are mottled. Suctioned ETT for moderate bloody, thick secretions.    Significant Shift Events: Vent alarming for low VT showing 20-50 ml. RN noted possible chest tube mis-placement, CXR ordered. When pt was shifted side to side for xray, VT increased to 700's with no apparent explanation. MD at bedside.    Additionally MAPs dropped frequently throughout the night to 40's-50's, coinciding with bradycardia episodes.    Vent settings:  Ventilation Mode: APRV  (Airway Pressure Release Ventilation)  FiO2 (%): 40 %  Oxygen Concentration (%): 40 %  Resp: 13  .    Heparin is running at 500 u/kg/hr, ACT range 159-171.    Pt is aneuric, blood loss was minimal. No product was given.      Intake/Output Summary (Last 24 hours) at 8/5/2019 0533  Last data filed at 8/5/2019 0500  Gross per 24 hour   Intake 3966.8 ml   Output 3484 ml   Net 482.8 ml       ECHO:  No results found for this or any previous visit.No results found for this or any previous visit.    CXR:  Recent Results (from the past 24 hour(s))   XR Chest Port 1 View    Narrative    Exam: XR CHEST PORT 1 VW, 8/4/2019 6:23 AM    Indication: iabp advanced    Comparison: Same day chest x-ray at 4:56 AM    Findings:   AP single view of the chest. Radiopaque marker of the intra-aortic  balloon pump now projects at the level of andrey. Additional lines and  tubes are grossly unchanged. Lung parenchyma is unchanged.      Impression    Impression: Radiopaque marker of the intra-aortic balloon pump now  projects at the level of andrey. Recommend advancing between 3 and 3.5  cm.    I have personally reviewed the examination and initial  interpretation  and I agree with the findings.    JERRY REDD MD   XR Chest Port 1 View    Narrative    Exam: XR CHEST PORT 1 VW, 8/4/2019 6:21 AM    Indication: IABP advanced 2cm    Comparison: 70 chest x-ray    Findings:   AP single view of the chest. Interval advancement of intra-aortic  balloon pump with radiopaque marker now projects 10.6 cm below the  andrey. Other lines and tubes are in stable position. Lung parenchyma  is grossly unchanged.      Impression    Impression:   1. Radiopaque marker of the intra-aortic balloon pump projects about  11 cm below the andrey. Recommend adjustment.  2. No interval change in the perihilar interstitial and alveolar  changes.    I have personally reviewed the examination and initial interpretation  and I agree with the findings.    JERRY REDD MD   XR Chest Port 1 View    Narrative    This is a preliminary resident report. Full report will follow.      Impression    IMPRESSION:   1. Stable support devices, including a left chest tube with sidehole  projecting beyond the rib margin, likely external to the pleural  space.  2. Prominent perihilar mixed airspace opacities, consistent with  pulmonary edema.   XR Chest Port 1 View    Narrative    This is a preliminary resident report. Full report will follow.      Impression    IMPRESSION:   1. Endotracheal tube tip projects approximately 9.3 cm above the  andrey. Additional support devices are unchanged, including a left  chest tube with sidehole projecting beyond the rib margin, likely  external to the pleural space.  2. Stable perihilar mixed airspace opacities, consistent with  pulmonary edema.       Labs:  Recent Labs   Lab 08/05/19  0337 08/05/19  0204 08/05/19  0022 08/04/19  2158   PH 7.38 7.39 7.39 7.41   PCO2 42 41 41 40   PO2 71* 76* 83 78*   HCO3 25 25 25 25   O2PER 40.0 40.0 40.0 40       Lab Results   Component Value Date    HGB 8.5 (L) 08/05/2019    PHGB 100 (H) 08/05/2019    PLT 58 (L) 08/05/2019    FIBR  398 08/05/2019    INR 1.86 (H) 08/05/2019    PTT 54 (H) 08/05/2019    DD >20.0 (H) 08/05/2019    AXA <0.10 08/05/2019    ANTCH 41 (L) 08/04/2019         Plan is for Nuc Med study at 9:00 followed by a family care conference.      Amber Esquivel, RRT  8/5/2019 5:33 AM

## 2019-08-05 NOTE — PROGRESS NOTES
CRRT STATUS NOTE    DATA:  Time:  5:46 PM  Pressures WNL:  YES  Filter Status:  WDL    Problems Reported/Alarms Noted:  none    Supplies Present:  YES    ASSESSMENT:  Patient Net Fluid Balance:  Net + 1144 ml since MN 8/5  Vital Signs:  T= 97.2 esophageal, HR= 59, BP=87/43 with MAP=61, Pt om ECMO. Pt is on 2 pressors.  Labs:  Oo=127, Creat=1.77, Hgb=8.2, Plts=51  Goals of Therapy:  Is=Os    INTERVENTIONS:   Pt restarted this am at 1042.    PLAN:  Continue POC. Call CRRT RN at [phone] with any questions or concerns.

## 2019-08-05 NOTE — PROGRESS NOTES
CRRT STATUS NOTE    DATA:  Time:  6:03 AM  Pressures WNL:  YES  Filter Status:  WDL    Problems Reported/Alarms Noted:  None noted per RN    Supplies Present:  YES    ASSESSMENT:  Patient Net Fluid Balance:  -338 mL yesterday.  Vital Signs:  T: 97.3; HR: 37-73; MAP: 58-75; SPO2: %  Labs: K: 4.3; WBC: 11.8; HGB: 8.5; PLT: 58  Goals of Therapy:  I=O    INTERVENTIONS:   None needed.    PLAN:  Continue fluid removal as tolerated per goals of therapy.  Check filter daily.  Change filter q 72 hrs and PRN.  Please contact the CRRT resource RN at [phone] with any questions/concerns.

## 2019-08-06 NOTE — CONSULTS
[facility], [place]   Palliative Care Daily Progress Note          Palliative Care was consulted for decisional support and goals of care. Consulted with primary team and bedside nurse and patient was transitioned to comfort measures only. Patient appears comfortable at this time. Danna Ayoub, Palliative Care , offered family support.      Thank you for the opportunity to participate in the care of this patient and family. Our team: will continue to follow.     During regular M-F work hours -- if you are not sure who specifically to contact -- please contact us by sending a text page to our team consult pager at [phone].    After regular work hours and on weekends/holidays, you can call our answering service at [phone]. Also, who's on call for us is available in [employer] Web.     MARISOL Crawford, CNS  Palliative Care Consult Team  Pager: [phone]

## 2019-08-06 NOTE — PROGRESS NOTES
ECMO Shift Summary:    Patient remains on VA ECMO, all equipment is functioning and alarms are appropriately set. RPM's 4200 with flow range 4.51-4.71 L/min. Sweep gas is at 5 LPM and FiO2 80%. Circuit remains free of air, clot and fibrin. Cannulas are secure with no bleeding from site. Extremities are warm. Suctioned ETT for large amount of fantasma blood.    Significant Shift Events: Patient went to CT for a head scan.  Upon arrival to the unit patient was not returning tidal volumes, and had no chest rise.  Patient was suctioned for a large amount of fantasma blood. Md was made aware, and vent settings were changed to increase peep.     Vent settings:  Ventilation Mode: APRV  (Airway Pressure Release Ventilation)  FiO2 (%): 40 %  Oxygen Concentration (%): 40 %  Resp: 13  .    Heparin is running at 700 u/hr, ACT range 179-183.    blood loss was minimal other than from ETT. Product given included one unit of PRBC and one unit of platelets.      Intake/Output Summary (Last 24 hours) at 8/6/2019 0622  Last data filed at 8/6/2019 0400  Gross per 24 hour   Intake 6798.75 ml   Output 5981 ml   Net 817.75 ml       ECHO:  No results found for this or any previous visit.No results found for this or any previous visit.    CXR:  Recent Results (from the past 24 hour(s))   NM Brain Scan Complete w Vascular Flow    Narrative    BRAIN PERFUSION: NM BRAIN SCAN COMPLETE W VASCULAR FLOW    DATE: 8/5/2019 9:54 AM    HISTORY: assess for brain death, ecmo patient. Please do 8/5 am.    COMPARISON: none    TECHNIQUE:   When possible a head band is placed to reduced the external carotid  circulation over the scalp. Dynamin images were obtained after the IV  administration of 24 mci  of [id number].  Static images were obtained  after the dynamic imaging series to look for intercranial venous  circulation.    FINDINGS:    The flow series demonstrates normal flow to the brain.    The delayed static images demonstrates brain uptake.       Impression    IMPRESSION:    1. No brain death.      The result was communicated to  [place] by Dr Gold at  10.02 on 8/5/2019.    I have personally reviewed the examination and initial interpretation  and I agree with the findings.    JERRY REDD MD   XR Chest Port 1 View    Narrative    EXAM: XR CHEST PORT 1 VW  8/6/2019 1:20 AM     HISTORY:  Check endotracheal tube placement and ECLS cannula  placement. DO NOT log-roll patient.  Place film under patient using  patient safety handling process.    COMPARISON: Chest radiograph dated 8/5/2019    FINDINGS: A single AP view of the chest is obtained. Endotracheal tube  tip projects over the upper thoracic trachea. Gastric and feeding  tubes course below the left hemidiaphragm. Gastric tube tip is off the  field-of-view. Feeding tube tip projects over the left upper quadrant.  Inferior approach ECMO cannulae tips project over the right atrium and  inferior cavoatrial junction. Intra-aortic balloon pump superior  marker projects approximately 2.5 cm below the andrey. Defibrillator  pads project over the left chest. Bilateral chest tubes are stable,  with the sidehole of the left chest tube projecting beyond the pleural  space..    Trachea is midline. Stable cardiac silhouette. Mixed interstitial and  patchy airspace opacities in a predominantly perihilar distribution.  No pleural effusion or pneumothorax.      Impression    IMPRESSION:   1. Intraaortic balloon pump marker projects approximately 2.5 cm below  the andrey. Recommend approximately 3 cm advancement. Additional  support devices are stable.  2. Perihilar mixed interstitial and patchy airspace opacities are not  significantly changed.    Findings discussed with ICU nurse Brian SAINZ at 6:11AM on 8/6/2019 by Dr. Quintero.    I have personally reviewed the examination and initial interpretation  and I agree with the findings.    AMIRA QUINTERO MD       Labs:  Recent Labs   Lab 08/06/19  0346 08/06/19  0159  08/06/19  0006 08/05/19  2139   PH 7.41 7.32* 7.32* 7.34*   PCO2 37 46* 47* 44   PO2 243* 234* 224* 193*   HCO3 23 24 24 23   O2PER 40.0 40.0 40.0 40       Lab Results   Component Value Date    HGB 8.5 (L) 08/06/2019    PHGB 100 (H) 08/06/2019    PLT 55 (L) 08/06/2019    FIBR 462 (H) 08/06/2019    INR 1.66 (H) 08/06/2019    PTT 62 (H) 08/06/2019    DD 19.8 (H) 08/06/2019    AXA 0.10 08/06/2019    ANTCH 32 (L) 08/05/2019         Plan is to remain on VA ecmo.          Jamila Jose, RRT  8/6/2019 6:22 AM

## 2019-08-06 NOTE — PROGRESS NOTES
Nephrology dialysis note    This patient was seen and examined while on dialysis. Laboratory results and nurses' notes were reviewed. Plan to discontinue aggressive measures. Will continue CRRT until that time.    No changes to management of volume, anemia, BMD, acidosis, or electrolytes.    Diagnosis - BLADIMIR after arrest    Beto Vyas MD  [phone]

## 2019-08-06 NOTE — PROGRESS NOTES
ECMO Shift Summary:    Patient remains on VA ECMO, all equipment is functioning and alarms are appropriately set. RPM's 4200 with flow range 4.3-4.52 L/min. Sweep gas is at 4 LPM and FiO2 80%. Circuit remains free of air, but a small amt of fibrin is visible. Cannulas are secure with minimal bleeding from site. Extremities are warm. Suctioned ETT for a scant amt of thin, nishant blood-tinged secretions.    Significant Shift Events: Sweep gas was weaned from 6 to 4 lpm. Nuclear med scan was positive for cerebral perfusion. Pt was shocked twice on my shift for VT/VF. Right pupil is newly fixed and dilated- head CT ordered for the AM.    Vent settings:  Ventilation Mode: APRV  (Airway Pressure Release Ventilation)  FiO2 (%): 40 %  Oxygen Concentration (%): 40 %  Resp: 13  .    Heparin is running at 700 u/hr, ACT range 180-200s.    Urine output is per RN charting, blood loss was small. Product given included two units of PRBC's and one unit of plts.      Intake/Output Summary (Last 24 hours) at 8/5/2019 2125  Last data filed at 8/5/2019 2100  Gross per 24 hour   Intake 5870.35 ml   Output 4714 ml   Net 1156.35 ml       ECHO:  No results found for this or any previous visit.No results found for this or any previous visit.    CXR:  Recent Results (from the past 24 hour(s))   XR Chest Port 1 View    Narrative    EXAM: XR CHEST PORT 1 VW  8/5/2019 1:10 AM     HISTORY:  Check endotracheal tube placement and cannula placement. DO  NOT log-roll patient.  Place film under patient using patient safety  handling process.    COMPARISON: Chest radiograph dated 8/4/2019    FINDINGS: A single AP view of the chest is obtained. Endotracheal tube  tip projects over the midthoracic trachea. Gastric tube tip is coiled  in the stomach with tip superiorly directed and projecting over the  stomach. Enteric tube courses below left hemidiaphragm and is  partially visualized, with tip projecting over the left upper quadrant  in the expected  location of the proximal jejunum. Intra-aortic balloon  pump marker projects at the level of the andrey. Inferior approach  ECMO cannulae are partially visualized with tips projecting over the  right atrium. Stable right chest tube. The left apical chest tube  sidehole projects beyond the left lateral ribs, unchanged from prior  and likely external to the pleural space.     Trachea is midline. Cardiac silhouette is stable. Prominent mixed  interstitial and patchy airspace opacities in a predominantly  perihilar distribution, similar to prior. Trace bilateral pleural  effusions. No pneumothorax.      Impression    IMPRESSION:   1. Stable support devices, including a left chest tube with sidehole  projecting beyond the rib margin, likely external to the pleural  space.  2. Prominent perihilar mixed airspace opacities, consistent with  pulmonary edema.    I have personally reviewed the examination and initial interpretation  and I agree with the findings.    ELIZABETH ESQUEDA MD   XR Chest Port 1 View    Narrative    EXAM: XR CHEST PORT 1 VW  8/5/2019 1:10 AM     HISTORY:  Chest tube dislodged    COMPARISON: Chest radiograph dated 8/5/2019 at 0053 hours    FINDINGS: A single AP view of the chest is obtained. Endotracheal tube  tip projects approximately 9.3 cm above the andrey. Gastric and  enteric tubes course below left hemidiaphragm with tips off the  field-of-view. Intra-aortic balloon pump marker projects at the level  of the andrey. Inferior approach ECMO cannulae are partially  visualized with tips projecting over the right atrium. Stable right  chest tube. Unchanged left chest tube with sidehole projecting beyond  the rib margin, likely external to the pleural space.    Trachea is midline. Cardiac silhouette is stable. Prominent mixed  interstitial and patchy airspace opacities in a predominantly  perihilar distribution, similar to prior. Costophrenic angles are  collimated off the field-of-view, however there are  no large pleural  effusions. No pneumothorax.      Impression    IMPRESSION:   1. Endotracheal tube tip projects approximately 9.3 cm above the  andrey. Additional support devices are unchanged, including a left  chest tube with sidehole projecting beyond the rib margin, likely  external to the pleural space.  2. Stable perihilar mixed airspace opacities, consistent with  pulmonary edema.    I have personally reviewed the examination and initial interpretation  and I agree with the findings.    ELIZABETH ESQUEDA MD   NM Brain Scan Complete w Vascular Flow    Narrative    BRAIN PERFUSION: NM BRAIN SCAN COMPLETE W VASCULAR FLOW    DATE: 8/5/2019 9:54 AM    HISTORY: assess for brain death, ecmo patient. Please do 8/5 am.    COMPARISON: none    TECHNIQUE:   When possible a head band is placed to reduced the external carotid  circulation over the scalp. Dynamin images were obtained after the IV  administration of 24 mci  of [id number].  Static images were obtained  after the dynamic imaging series to look for intercranial venous  circulation.    FINDINGS:    The flow series demonstrates normal flow to the brain.    The delayed static images demonstrates brain uptake.      Impression    IMPRESSION:    1. No brain death.      The result was communicated to  [place] by Dr Gold at  10.02 on 8/5/2019.    I have personally reviewed the examination and initial interpretation  and I agree with the findings.    JERRY REDD MD       Labs:  Recent Labs   Lab 08/05/19  1949 08/05/19  1815 08/05/19  1546 08/05/19  1410   PH 7.38 7.38 7.46* 7.45   PCO2 40 40 33* 33*   PO2 221* 216* 241* 265*   HCO3 23 23 23 23   O2PER 40 40 40 40       Lab Results   Component Value Date    HGB 8.2 (L) 08/05/2019    PHGB 100 (H) 08/05/2019    PLT 51 (L) 08/05/2019    FIBR 446 (H) 08/05/2019    INR 1.71 (H) 08/05/2019    PTT 60 (H) 08/05/2019    DD >20.0 (H) 08/05/2019    AXA <0.10 08/05/2019    ANTCH 32 (L) 08/05/2019         Plan is to  continue on VA ECMO, with repeat head CT in the AM.      Kim Yin, RRT  8/5/2019 9:25 PM

## 2019-08-06 NOTE — PROGRESS NOTES
Cardiology Critical Care Note - Cardiology  Warren Nicole M.D.  The patient remains unstable in the ICU with on-going need for ventilator support, parenteral medications for the adjustment of blood pressure and cardiac output and maintenance of renal function.      The patient is seen for prolonged ventilator management requiring oxygen and/or pressure modulation; shock requiring vasopressor and or inotropic agents; low cardiac output necessitating inotropic agents, vasopressors and afterload reducing agents; VA ECMO; acute renal failure requiring fluid and diuretic management; sepsis requiring antibiotic selection and monitoring, vasopressors, fluid management to maintain blood pressure and secondary organ function Anoxic brain injury requiring hypernatremia and last night developed herniation.    I personally reviewed:  Arterial and venous blood gases to assess acid base balance, oxygenation, and ventilator settings.    Hemodynamic parameters were assessed such as  RAP, estimated LVEDP cardiac output and vascular resistances in order to adjust fluids and infused medications for blood pressure and cardiac output maintenance.  Ventilatory settings and/or supplement oxygen needs in order to obtain optimal oxygenation and electrolyte balance at low possible pressure support and inspired oxygen tension      ECMO Attending Progress Note  8/6/2019    Atilio Szymanski is a 68 year old male who was cannulated for ECMO LM thrombosis due to chronic CAD and RCA disease.  Patient had symptoms of advanced heart failure prior to his arrest and his baseline LV function is unknown.    Interval events: stented the ostial LM yesterday and rewarmed. His pressure requirements have increased and he has shock liver that explains his lactate levels. He is on CCRT    Head CT showed diffuse anoxic brain injury that evolved with edema and poor grey white matter differentiation.  His prognosis has become significantly worse with  this finding.    Physical Exam:  Temp:  [96.6  F (35.9  C)-97.5  F (36.4  C)] 96.8  F (36  C)  Heart Rate:  [37-62] 54  Resp:  [12-13] 12  MAP:  [58 mmHg-99 mmHg] 71 mmHg  Arterial Line BP: ()/(41-85) 87/46  FiO2 (%):  [40 %] 40 %  SpO2:  [92 %-100 %] 100 %    Intake/Output Summary (Last 24 hours) at 8/3/2019 1012  Last data filed at 8/3/2019 0900  Gross per 24 hour   Intake 5055.01 ml   Output 1824 ml   Net 3231.01 ml    Ventilation Mode: CMV/AC  (Continuous Mandatory Ventilation/ Assist Control)  FiO2 (%): 40 %  Rate Set (breaths/minute): 12 breaths/min  Tidal Volume Set (mL): 500 mL  PEEP (cm H2O): 8 cmH2O  Oxygen Concentration (%): 40 %  Resp: 12     General: no acute distress, intubated and sedated  HEENT: PERRLA, conjunctiva clear, without icterus or pallor, oropharyx clear  Cardiac: RRR nl S1S2   Lungs: clear to auscultation and percussion bilaterally anterior  Abdomen: soft, nontender, non distended, no hepatosplenomegaly or masses  Extremities: without edema or cyanosis; pulses are dopplerable;   Skin: normal skin appearance without worrisome lesions.   Neurologic:intubated and sedated,     Labs:  Recent Labs   Lab 08/06/19  0818 08/06/19  0346 08/06/19  0159 08/06/19  0006   PH 7.43 7.41 7.32* 7.32*   PCO2 35 37 46* 47*   PO2 256* 243* 234* 224*   HCO3 23 23 24 24   O2PER 40 40.0 40.0 40.0      Recent Labs   Lab 08/06/19  0346 08/05/19  2139 08/05/19  1546 08/05/19  1016   WBC 8.6 9.7 9.8 10.7   HGB 8.5* 8.5* 8.2* 8.1*     Creatinine   Date Value Ref Range Status   08/06/2019 1.78 (H) 0.66 - 1.25 mg/dL Final   08/05/2019 1.77 (H) 0.66 - 1.25 mg/dL Final   08/05/2019 1.77 (H) 0.66 - 1.25 mg/dL Final   08/05/2019 1.94 (H) 0.66 - 1.25 mg/dL Final       ECMO Issues including assessments and plan on DOS 8/6/2019:  Neuro: Sedated for mechanical ventilation and ECMO.  NIRS stable 60s b/l   CV: Cardiogenic shock.  Hemodynamically stable on vaso 2, levo 0.05 phenylephrine 0.1    Pulm: Flows unchanged at 3.8,  Sweep unchanged at 5, Keep vent settings at rest settings   FEN/Renal: Electrolytes stable w/ replacement protocols in place, 1.9 Cr stable, UOP stable  Heme: ACT goal: 190, Hemoglobin 8.5 .  Goals: if O2 sat >85% Hgb >8.  If O2 Sat <85% keep Hgb 10-12.  Minimal oozing around the ECMO cannulas.  ID: Receiving empiric antibiotics  Cannulae: Position is acceptable on exam and the available imaging.  Distal perfusion cannula is in place and patent.     I have personally reviewed the ECMO flows, oxygenation and CO2 clearance, anticoagulation, and cannula position.  I have also personally assessed the patient's systemic response with hemodynamics, oxygenation, ventilation, and bleeding.       The patient requires continued ECMO support and management in the ICU.        I have seen and examined the patient with the CSI team. I agree with the assessment and plan of the note above.I have reviewed pertinent labs.    Patient has reached futility and withdrawal of support is recommended. We will discuss with family.     Warren Nicole MD  Interventional Cardiology  Pager: [phone]

## 2019-08-06 NOTE — PROCEDURES
Procedure Date: 08/06/2019      EEG #-6      DATE OF RECORDING/SERVICE DATE:  08/06/2019      SOURCE FILE DURATION:  8 hours and 55 minutes.      DAY #6 OF VIDEO EEG MONITORING      CLINICAL HISTORY:  This patient is a 68-year-old male who presented with cardiac arrest.  EEG was requested for further evaluation for seizures and encephalopathy.      CURRENT MEDICATIONS:  Versed, fentanyl.      TECHNICAL SUMMARY: This continuous video- EEG monitoring procedure was performed with 23 scalp electrodes in 10-20 electrode system placements, and additional scalp, precordial and other surface electrodes used for electrical referencing and artifact detection.  Video monitoring was utilized and periodically reviewed by EEG technologists and the physician for electroclinical correlations.     BACKGROUND ACTIVITIES:  The background activities of this EEG were symmetric.  The background is severely suppressed with minimal cerebral activities throughout the recording.      Hyperventilation and photic stimulation were not performed.  No clear sleep-waking cycles were observed during this recording.      No interictal epileptiform activities.      ICTAL:  No seizures.  No periodic activities.      SUMMARY OF DAY #6 OF VIDEO EEG MONITORING:  This electroencephalogram is markedly abnormal due to the presence of severe attenuation of the background activities with minimal cerebral activities.  No cerebral activities over 5 microvolts were observed during this day of recording.  These findings are consistent with severe diffuse encephalopathy of which the etiology is nonspecific.  No seizures were recorded.      SUMMARY OF 6 DAYS OF VIDEO EEG MONITORING:  During these 6 days of VEEG monitoring, the EEG is markedly abnormal due to the presence of severe attenuation of the background activities with minimal cerebral activities.  No cerebral activities over 5 microvolts were observed during this day of recording.  These findings are  consistent with severe diffuse encephalopathy of which the etiology is nonspecific.  No seizures were recorded.              FRANK MARTINEZ MD             D: 2019   T: 2019   MT: RAMIRO      Name:     VARSHA NEAL   MRN:      [id number]        Account:        [id number]   :      1950           Procedure Date: 2019      Document: [id number]

## 2019-08-06 NOTE — PROCEDURES
Procedure Date: 2019      EEG #-4       DATE OF RECORDING/SERVICE DATE:  2019       SOURCE FILE DURATION:  23 hours and 39 minutes.      DAY #4 OF VIDEO EEG MONITORING      CLINICAL HISTORY:  This patient is a 68-year-old male who presented with cardiac arrest.  EEG was requested for further evaluation for seizures and encephalopathy.      CURRENT MEDICATIONS:  Versed, fentanyl.      TECHNICAL SUMMARY: This continuous video- EEG monitoring procedure was performed with 23 scalp electrodes in 10-20 electrode system placements, and additional scalp, precordial and other surface electrodes used for electrical referencing and artifact detection.  Video monitoring was utilized and periodically reviewed by EEG technologists and the physician for electroclinical correlations.     BACKGROUND ACTIVITIES:  The background activities of this EEG were symmetric.  The background is severely suppressed with minimal cerebral activities throughout the recording.      Hyperventilation and photic stimulation were not performed.  No clear sleep-waking cycles were observed during this recording.      No interictal epileptiform activities.      ICTAL:  No seizures.  No periodic activities.      SUMMARY OF DAY #4 OF VIDEO EEG MONITORING:  This EEG is markedly abnormal due to the presence of severe attenuation of the background activities with minimal cerebral activities.  However, the sedation was just discontinued early this morning, the background activities off sedation should be reevaluated 24-48 hours after sedation was discontinued.         FRANK MARTINEZ MD             D: 2019   T: 2019   MT: ELIZABETH      Name:     VARSHA NEAL   MRN:      [id number]        Account:        [id number]   :      1950           Procedure Date: 2019      Document: [id number]

## 2019-08-06 NOTE — PROGRESS NOTES
Dr Olivas spoke with family and decided to make pt comfort cares and withdraw cares.  Pt extubated, decannulated, IABP, CRRT and drips stopped.  TOD 1252.  Family at the bedside and given rosary necklace and took pictures and belongings.  [facility] notified.

## 2019-08-06 NOTE — PROCEDURES
Procedure Date: 2019      EEG #-5       DATE OF RECORDING/SERVICE DATE:   2019       SOURCE FILE DURATION:   23 hours and 2 minutes.       DAY #5 OF EEG MONITORING      CLINICAL HISTORY:  This patient is a 68-year-old male who presented with cardiac arrest.  EEG was requested for further evaluation for seizures and encephalopathy.      CURRENT MEDICATIONS:  Versed, fentanyl.      TECHNICAL SUMMARY: This continuous video- EEG monitoring procedure was performed with 23 scalp electrodes in 10-20 electrode system placements, and additional scalp, precordial and other surface electrodes used for electrical referencing and artifact detection.  Video monitoring was utilized and periodically reviewed by EEG technologists and the physician for electroclinical correlations.     BACKGROUND ACTIVITIES:  The background activities of this EEG were symmetric.  The background is severely suppressed with minimal cerebral activities throughout the recording.      Hyperventilation and photic stimulation were not performed.  No clear sleep-waking cycles were observed during this recording.      No interictal epileptiform activities.      ICTAL:  No seizures.  No periodic activities.      SUMMARY OF DAY #5 OF VIDEO EEG MONITORING:  This electroencephalogram is markedly abnormal due to the presence of severe attenuation of the background activities with minimal cerebral activities.  No cerebral activities over 5 microvolts were observed during this day of recording.  These findings are consistent with severe diffuse encephalopathy of which the etiology is nonspecific.  No seizures were recorded.         FRANK MARTINEZ MD             D: 2019   T: 2019   MT:       Name:     VARSHA NEAL   MRN:      [id number]        Account:        [id number]   :      1950           Procedure Date: 2019      Document: [id number]

## 2019-08-06 NOTE — PLAN OF CARE
CT run after pupils fixed and dilated. 3% saline increased to 150mL/hr. 1u pRBC, 1u plt given.     No response to stimulus, pupils fixed at 6mm. Sinus karrie with frequent PVC. Levo off, vaso at 3u/hr. ECMO flows 4.5lpm, sweep 5lpm. 80% FiO2. Lungs coarse. APRV 30/10 initially, but changed to AC 40% 500/12/8 after only able to receive 50mL Vt. Only able to receive 350mL Vt on AC d/t PIP 45. BM+ loose. CRRT continues, access alarms occasionally. Recirculated while at CT. +45mL since midnight. Right CT with 240mL left pleural with 330mL out overnight.     Brian Rojas RN 08/06/19 7:39 AM    Hours of cares [phone]

## 2019-08-06 NOTE — PROGRESS NOTES
Cardiology - CSI service    Interval events: Concern about brain herniation given pupil dilation. CT confirmed this today. Will speak to family.      Temp:  [96.6  F (35.9  C)-97.5  F (36.4  C)] 96.6  F (35.9  C)  Heart Rate:  [37-62] 51  Resp:  [13] 13  MAP:  [58 mmHg-99 mmHg] 61 mmHg  Arterial Line BP: ()/(41-85) 93/48  FiO2 (%):  [40 %] 40 %  SpO2:  [92 %-100 %] 100 %  IABP via LFA, 1:1, triggered by EKG, augmented MAPs 70, L radial and distal pulse in tact   ECMO settings:   ECMO flows 4.5lpm, sweep 5lpm. 80% FiO2.    I/O last 3 completed shifts:  In: 7134.75 [I.V.:3917.75; NG/GT:250]  Out: 6869 [Urine:400; Emesis/NG output:350; Other:3849; Stool:1200; Chest Tube:1070]     Hemodynamic drips: vaso 3, amio 1. Insulin.   Selected medications: heparin (ACT goal 160-180).     Physical examination:  Vitals:    08/02/19 0300 08/03/19 0515 08/04/19 0445 08/05/19 0400   Weight: 96 kg (211 lb 10.3 oz) 97.1 kg (214 lb 1.1 oz) 97.5 kg (214 lb 15.2 oz) 95 kg (209 lb 7 oz)    08/06/19 0400   Weight: 96.2 kg (212 lb 1.3 oz)     GENERAL APPEARANCE: Intubated, sedated. NAD.  HEENT: No icterus, ETT in place, OG tube in place  CARDIOVASCULAR: regular rate and rhythm, normal S1 and S2, no S3 or S4 and no murmur, click or rub. Normal PMI. Pulses dopplerable.  RESP: Coarse bilaterally. Mechanical ventilation.    GASTRO: Soft, bowel sounds hypoactive but present  GENITOURINARY: Bailey in place.  EXTREMITIES: Cool, 1+ edema, pulses dopplered, as above. VA ECMO cannulas in right groin, ThermoGard  NEURO: Sedated and intubated. Fent and Versed for sedation, as below.  INTEGUMENTARY: No rashes. Cannula/Line sites CDI  LINES/TUBES/DRAINS: (noted below) V-A ECMO Cannulas L groin, arterial sheath and ThermoGard in R groin. R radial Arterial line. ETT. OG. Bailey Catheter.    Labs were reviewed.    BMP  Recent Labs   Lab 08/06/19  0346 08/06/19  0006 08/05/19  2139 08/05/19  1949 08/05/19  1546  08/05/19  1016   * 154* 152* 152*  150*   < > 150*   POTASSIUM Specimen slightly hemolyzed, potassium may be falsely elevated  --  4.0  --  4.1  --  4.2   CHLORIDE 125*  --  123*  --  121*  --  120*   JEANNE 7.5*  --  7.8*  --  7.4*  --  7.2*   CO2 22  --  25  --  22  --  22   BUN 43*  --  43*  --  42*  --  44*   CR 1.78*  --  1.77*  --  1.77*  --  1.94*   *  163*  --  182*  190*  --  209*  226*  --  186*  190*    < > = values in this interval not displayed.     LFTs  Recent Labs   Lab 08/06/19 0346 08/05/19 2139 08/05/19 1546 08/05/19  1016   ALKPHOS 79 84 79 78   AST Unsatisfactory specimen - hemolyzed 1,423* 1,610* 1,995*   ALT 1,170* 1,324* 1,410* 1,690*   BILITOTAL 5.3* 5.7* 5.0* 4.7*   PROTTOTAL 4.6* 4.6* 4.3* 4.2*   ALBUMIN 2.1* 2.2* 2.1* 2.0*      CBC  Recent Labs   Lab 08/06/19 0346 08/05/19 2139 08/05/19 1546 08/05/19  1016   WBC 8.6 9.7 9.8 10.7   RBC 2.80* 2.87* 2.72* 2.72*   HGB 8.5* 8.5* 8.2* 8.1*   HCT 25.7* 26.3* 24.9* 24.4*   MCV 92 92 92 90   MCH 30.4 29.6 30.1 29.8   MCHC 33.1 32.3 32.9 33.2   RDW 15.5* 15.5* 15.5* 15.8*   PLT 55* 50* 51* 49*     INR  Recent Labs   Lab 08/06/19 0346 08/05/19 2139 08/05/19 1546 08/05/19  1016   INR 1.66* 1.59* 1.71* 1.79*     1.5.    Liver enzymes improving.     IMPRESSION & PLAN  Atilio Szymanski [[id number]] (Anonymous [place]) is a 68 year old male with a history of HTN, HLD who was admitted on 7/31/2019 for PEA arrest. Arrest at home, no bystander CPR. Transferred from [facility]. ~120 min before placed on ECMO. 3V disease involving culprit LM.     Neurology: Intubated, sedated, paralyzed. Cooled to 36 degrees.  --continue versed, fentanyl, and cis as needed to maintain paralysis   - RASS goal -4 to -5. CT head worsened on day 3 but no herniation.   - Nuclear perfusion scan normal. Brain herniation on CT scan. Will update family today.    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 3V disease. ORIANA to LM x1, pLAD x1, pLCx x1, RCA x4.  Peripheral V-A ECMO inserted for refractory PEA. Approximately 120 min downtime. LA initially 9.0 on arrival.   TTE: complete ordered  --wean pressors/inotropes as able  --TTE - near standstill heart with minimal cardiac motion and mitral valve without closure during systole. Nuclear brain perfusion scan was normal. Possible decannulation tomm.   --continue ASA 81mg and ticagrelor 90mg BID  --hold temp at 36 given considerable bleeding  --ACT goal 180-200  --hold lipitor for now given likely hepatic injury during arrest  --hold ACE/ARB for now given likely reduced renal fxn after arrest  --holding beta blocker given shock   --went down to cath lab today, got more balloon and stent to prox left main (was 90% stenosed).     Pulmonary:  # Acute hypoxic/hypercarbic respiratory failure  # Pulmonary edema  # B/l Pleural effusion s/p b/l chest tube at OSH  # Possible diffuse alveolar hemorrhage Vent Settings:  ETT in stable position.  Now weaning vent requirements.  CXR: Lines in stable position. Pulmonary edema, b/l pleural effusions s/p b/l chest tube placement at OSH (not sterile).  --wean vent as able  --daily CXR  Chest CT  --Q2h ABGs for now       GI and Nutrition: No known medical hx.  --monitor BID LFTs  --NPO for now - nutrition consult pending feeding tube placement  --bowel regimen - on hold for now  --GI Prophylaxis: PPI       Renal, Fluid and Electrolytes: Cr unknown baseline. 1.25 on arrival.  --monitor urine output  --maintain K>3 and Mg>2       Infectious Disease:  # Presumed aspiration No signs of infection. Leukocytosis c/w arrest. Blood cultures collected.   --vancomycin/zosyn x5 days for ECMO  --daily blood cultures  --monitor for signs of infection given cooling, lines, and leukocytosis   Hematology and Oncology:  # Acute blood loss anemia Receiving heparin for ECMO and ASA/ticagrelor for ORIANA.   --cryo PRN fibrinogen < 200; FFP for INR  >2  --Transfuse for Hgb<10  --holding heparin given bleeding, will allow heparin through distal reperfusion cannula  - S/p massive transfusion protocol, s/p Factor VII  --US LE w/ arterial duplex per ECMO protocol   --DVT PPX: Heparin as above   Endocrinology: No known medical history. BG elevated.  --insulin gtt  --f/u HgbA1c    Lines: L femoral arterial and venous ECMO cannulae July 31, 2019  R femoral arterial line July 31, 2019  R radial arterial line July 31, 2019  ETT July 31, 2019  Bailey catheter July 31, 2019  OG tube July 31, 2019  Restraint: needed    Current lines are required for patient management       Family update by me today: Yes      Code Status:     The pt was discussed and evaluated with Dr. Nicole, attending physician, who agrees with the assessment and plan above.     Thank you for allowing me to care for this patient. This has been discussed with the attending physician.    La Ortega MD  Cardiology Fellow, PGY-5      I have seen and examined the patient with the CSI team. I agree with the assessment and plan of the note above.I have reviewed pertinent labs.     Warren Nicole MD  Interventional Cardiology  Pager: [phone]

## 2019-08-06 NOTE — PLAN OF CARE
Pt remains intubated, on VA ECMO with IABP. Sedation has been off for over 24 hour and no response to stimuli, no gag or cough and not overbreathing the vent. Nuc med study done @ 9am. At noon assessment pupils equal and sluggish. At 1320 pupils fixed and dilated right larger then left. Dr Ortega notified neuro crit called to assess pt. 3% NS increased to 100 ml/hr. Vent settings of APRV unchanged with -400 with no issues. Pt having frequent ectopy with HR 38-75. Pt shocked twice for VT/VF. Amiodarone loaded over 30 minutes. Amio gtt started. CRRT having minimal pressure alarms. Pt is + 1.1L today. 2 units PRBC for hgb <8 1 pack plts given for platelet <50. No issues with IABP overnight. Plan for head CT @ 0500 tomorrow. Pts family updated at bedside with .

## 2019-08-06 NOTE — PROGRESS NOTES
ECLS Discontinuation Note:    Patient was extubated by Jeronimo Zimmer RRT prior to discontinuation of ECLS, approximately 1145am.  ECLS was discontinued 8/6/2019 @ 1240.  Time of death 1258.      Mario Hewitt, RRT  8/6/2019 1:04 PM

## 2019-08-06 NOTE — DISCHARGE SUMMARY
[facility] Discharge Summary    Atilio Szymanski MRN# [id number]   Age: 68 year old YOB: 1950     Date of Admission:  7/31/2019  Date of Discharge::  8/6/2019  Admitting Physician:  Solomon Ambrocio MD  Discharge Physician:  La Ortega MD           Discharge Diagnosis:   Cardiac arrest          Procedures:   ECMO (see notes)          Discharge Medications:     There are no discharge medications for this patient.             Brief History of Illness:   Atilio Szymanski [[id number]] (presented as Anonymous [place]rie [place]) is a 68 year old male with a history of HTN, HLD who was admitted on 7/31/2019 for PEA arrest. Patient had reportedly been complaining of exertional dyspnea for ~5 weeks. Tonight, he was more fatigued and developed lower extremity edema. His wife called his daughter, who decided to bring him into the hospital. On the way into [facility], via private vehicle, patient became unresponsive. EMS was called, where he was found to be in PEA. ACLS was started and he was transferred to [facility]. On arrival to OSH, he remained in PEA. He had several rounds of epi, bicarb, magnesium, calcium.  Rhythm checks with persistent PEA. Chest x-ray showed pulmonary edema and bilateral chest tubes were placed, 500 cc out of the left chest tube, 200 cc the right chest tube.  Right femoral arterial line was placed, with some pulsatile in the waveform, however no palpable pulse in ACLS was continued during this time.  After over an hour of active ACLS, patient was demonstrating some movement and spontaneously opening eyes, so [facility] Cath Lab was called and VT/VF program was activated for ECMO.  Patient arrived to the Cath Lab at 2250 and ECMO was started at 2303.          Hospital Course:     Atilio Szymanski [[id number]] (Anonymous [facility]) is a 68 year old male with a history of HTN, HLD who was admitted on 7/31/2019 for PEA arrest. Arrest at home, no bystander CPR. Transferred from [facility].  ~120 min before placed on ECMO. 3V disease involving culprit LM.     Neurology: I-Nuclear perfusion scan normal. Brain herniation on CT scan.  This happened on August 6.  Family is updated and decided to make patient comfort care after learning about tonsillar herniation on CT scan from earlier this morning.   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 3V disease. ORIANA to LM x1, pLAD x1, pLCx x1, RCA x4.  Peripheral V-A ECMO inserted for refractory PEA. Approximately 120 min downtime. LA initially 9.0 on arrival.   --TTE - near standstill heart with minimal cardiac motion and mitral valve without closure during systole. Nuclear brain perfusion scan was normal.   --Patient had horrible disease in the Cath Lab.  He required multiple balloons and stents in the left main on arrival on July 31.  He was taken down to the Cath Lab 3 days later after having more ventricular fibrillation.  He had another stent placed in the left main.  However, patient never regained any contractility when we paused balloon pump.   Pulmonary:  # Acute hypoxic/hypercarbic respiratory failure  # Pulmonary edema  # B/l Pleural effusion s/p b/l chest tube at    CXR:Pulmonary edema, b/l pleural effusions s/p b/l chest tube placement at OSH (not sterile). Lots of fluid draining the entire time, about  ml/hr.              Discharge Instructions and Follow-Up:   Discharge diet: Death   Discharge activity: Death   Discharge follow-up: Death           Discharge Disposition:   Transferred to Death      La Ortega MD     Thank you for allowing me to care for this patient. This has been discussed with the attending physician.    La Ortega MD  Cardiology Fellow, PGY-4    I have seen and examined the patient with the CSI team. I agree with the assessment and plan of the discharge summary note above.I have reviewed pertinent labs. More than 30  minutes were spent organizing the patient's discharge.    Warren Nicole MD  Interventional Cardiology  Pager: [phone]

## 2019-08-07 LAB
BACTERIA SPEC CULT: NO GROWTH
Lab: NORMAL
SPECIMEN SOURCE: NORMAL

## 2019-08-08 LAB
7AMINOCLONAZEPAM BLD CFM-MCNC: NEGATIVE NG/ML
ALPRAZ SERPL-MCNC: NEGATIVE NG/ML
AMPHETAMINES SPEC-MCNC: NEGATIVE NG/ML
APAP BLD-MCNC: NEGATIVE UG/ML
BACTERIA SPEC CULT: NO GROWTH
BARBITURATES SPEC-MCNC: NEGATIVE UG/ML
BENZODIAZ SERPL QL: POSITIVE
BENZODIAZ SPEC-MCNC: POSITIVE NG/ML
BUPRENORPHINE SERPLBLD-MCNC: NEGATIVE NG/ML
CARBOXYTHC BLD-MCNC: NEGATIVE NG/ML
CARISOPRODOL IA: NEGATIVE UG/ML
CHLORDIAZEP SERPL-MCNC: NEGATIVE NG/ML
CLONAZEPAM SERPL CFM-MCNC: NEGATIVE NG/ML
COCAINE METABOLITE IA: NEGATIVE NG/ML
DECLARED MEDICATIONS: ABNORMAL
DESALKYLFLURAZ SERPL-MCNC: NEGATIVE NG/ML
DIAZEPAM SERPL-MCNC: NEGATIVE NG/ML
ETHANOL BLD-MCNC: NEGATIVE GM/DL
FENTANYL IA: NEGATIVE NG/ML
FLURAZEPAM SERPL-MCNC: NEGATIVE NG/ML
GABAPENTIN IA: NEGATIVE UG/ML
LORAZEPAM BLD CFM-MCNC: NEGATIVE NG/ML
Lab: NORMAL
MEPERIDINE SERPLBLD-MCNC: NEGATIVE NG/ML
METHADONE BLD-MCNC: NEGATIVE NG/ML
MIDAZOLAM BLD CFM-MCNC: 61 NG/ML
NORCHLORDIAZEP SERPL-MCNC: NEGATIVE NG/ML
NORDIAZEPAM SERPL-MCNC: NEGATIVE NG/ML
OPIATES SPEC-MCNC: NEGATIVE NG/ML
OTHER DRUGS DETECTED: POSITIVE
OXAZEPAM SERPL-MCNC: NEGATIVE NG/ML
OXYCODONE SERPLBLD SCN-MCNC: NEGATIVE NG/ML
PCP SPEC-MCNC: NEGATIVE NG/ML
PROPOXYPH SPEC-MCNC: NEGATIVE NG/ML
S100 CA BINDING PROTEIN B SER-MCNC: 211 NG/L (ref 0–96)
S100 CA BINDING PROTEIN B SER-MCNC: 213 NG/L (ref 0–96)
S100 CA BINDING PROTEIN B SER-MCNC: 327 NG/L (ref 0–96)
SPECIMEN SOURCE: NORMAL
TEMAZEPAM SERPL-MCNC: NEGATIVE NG/ML
TRAMADOL BLD-MCNC: NEGATIVE NG/ML
TRIAZOLAM BLD-MCNC: NEGATIVE NG/ML

## 2019-08-10 LAB
BACTERIA SPEC CULT: NO GROWTH
Lab: NORMAL
NSE SERPL-MCNC: 28.7 UG/L (ref 3.7–8.9)
NSE SERPL-MCNC: 33.6 UG/L (ref 3.7–8.9)
SPECIMEN SOURCE: NORMAL

## 2019-08-11 LAB
BACTERIA SPEC CULT: NO GROWTH
Lab: NORMAL
SPECIMEN SOURCE: NORMAL

## 2019-08-12 LAB
BACTERIA SPEC CULT: NO GROWTH
Lab: NORMAL
SPECIMEN SOURCE: NORMAL
